# Patient Record
Sex: MALE | Race: WHITE
[De-identification: names, ages, dates, MRNs, and addresses within clinical notes are randomized per-mention and may not be internally consistent; named-entity substitution may affect disease eponyms.]

---

## 2018-09-18 ENCOUNTER — HOSPITAL ENCOUNTER (EMERGENCY)
Dept: HOSPITAL 46 - ED | Age: 68
Discharge: HOME | End: 2018-09-18
Payer: MEDICARE

## 2018-09-18 VITALS — WEIGHT: 220 LBS | BODY MASS INDEX: 32.58 KG/M2 | HEIGHT: 69 IN

## 2018-09-18 DIAGNOSIS — S32.019D: ICD-10-CM

## 2018-09-18 DIAGNOSIS — Z79.899: ICD-10-CM

## 2018-09-18 DIAGNOSIS — G93.41: Primary | ICD-10-CM

## 2018-09-18 NOTE — EKG
[facility]
                                    [address] [facility]
                                  [place], [place]  [address]
_________________________________________________________________________________________
                                                                 Signed   
 
 
Normal sinus rhythm
Normal ECG
No previous ECGs available
Confirmed by JOHNIE JOHNSON DO ([id number]) on 9/18/2018 8:51:15 PM
 
 
 
 
 
 
 
 
 
 
 
 
 
 
 
 
 
 
 
 
 
 
 
 
 
 
 
 
 
 
 
 
 
 
 
 
 
 
 
 
 
    Electronically Signed By: JOHNIE JOHNSON DO  09/18/18 2051
_________________________________________________________________________________________
PATIENT NAME:     JAZLYN CLARKEAGNES RIVERA                  
MEDICAL RECORD #: [id number]                     Electrocardiogram             
          ACCT #: [id number]  
DATE OF BIRTH:   [birth year]                                       
PHYSICIAN:   JOHNIE JOHNSON DO                     REPORT #: [id number]
REPORT IS CONFIDENTIAL AND NOT TO BE RELEASED WITHOUT AUTHORIZATION

## 2018-09-18 NOTE — XMS
Encounter Summary
  Created on: 2018
 
 Elizabeth Jp RIVERA
 External Reference #: [id number]
 : 48
 Sex: Male
 
 Demographics
 
 
+-----------------------+----------------------+
| Address               | [address]          |
|                       | [place]  [address] |
+-----------------------+----------------------+
| Home Phone            | [phone]      |
+-----------------------+----------------------+
| Preferred Language    | Unknown              |
+-----------------------+----------------------+
| Marital Status        |              |
+-----------------------+----------------------+
| Oriental orthodox Affiliation | [id number]                 |
+-----------------------+----------------------+
| Race                  | Unknown              |
+-----------------------+----------------------+
| Ethnic Group          | Unknown              |
+-----------------------+----------------------+
 
 
 Author
 
 
+--------------+-----------------------+
| Author       | [facility] |
+--------------+-----------------------+
| Organization | [facility] |
+--------------+-----------------------+
| Address      | Unknown               |
+--------------+-----------------------+
| Phone        | Unavailable           |
+--------------+-----------------------+
 
 
 
 Support
 
 
+---------------+--------------+---------+-----------------+
| Name          | Relationship | Address | Phone           |
+---------------+--------------+---------+-----------------+
| Delilah Hall | ECON         | Unknown | [phone] |
+---------------+--------------+---------+-----------------+
| Tesha Veloz   | ECON         | Unknown | [phone] |
+---------------+--------------+---------+-----------------+
 
 
 
 Care Team Providers
 
 
 
+------------------------+------+-----------------+
| Care Team Member Name  | Role | Phone           |
+------------------------+------+-----------------+
| Bj Samuel MD | PCP  | [phone] |
+------------------------+------+-----------------+
 
 
 
 Reason for Visit
 
 
+--------+-----------+
| Reason | Comments  |
+--------+-----------+
| Other  | Emphysema |
+--------+-----------+
 Consult and Treat (Urgent)
 
+----------+--------+-------------+--------------+--------------+---------------+
| Status   | Reason | Specialty   | Diagnoses /  | Referred By  | Referred To   |
|          |        |             | Procedures   | Contact      | Contact       |
+----------+--------+-------------+--------------+--------------+---------------+
| Pending  |        | Pulmonology |   Diagnoses  |   Sermon,    |   Ben,   |
| Review   |        |             |  Emphysema,  | Kirby FITZPATRICK,   | Edwina     |
|          |        |             | unspecified  | [place]  [address]      | MD Linda   |
|          |        |             | ([facility])        | [place]       | [address] |
|          |        |             | Hypoxemia    | [place]  |             |
|          |        |             | Pulmonary    | [address]        | [place], [place]  |
|          |        |             | nodules      | Phone:       | [address]  Phone: |
|          |        |             |              | [phone] |  [phone] |
|          |        |             |              |   Fax:       |   Fax:        |
|          |        |             |              | [phone] | [phone]  |
+----------+--------+-------------+--------------+--------------+---------------+
 
 
 
 
 Encounter Details
 
 
+--------+---------+---------------------+----------------------+----------------------+
| Date   | Type    | Department          | Care Team            | Description          |
+--------+---------+---------------------+----------------------+----------------------+
| / | Office  |   [facility]     |   Ben,          | Asthma with COPD     |
| 2018   | Visit   | Pulmonology  [address]   | Edwina Mckeon,   | (chronic obstructive |
|        |         | Nelli GIBSON   | MD  [address] |  pulmonary disease)  |
|        |         | [place], [place]        |   [place] [address] | ([facility]) (Primary Dx);  |
|        |         | [address]          |   [phone]       | Nocturnal hypoxemia; |
|        |         | [phone]        | [phone] (Fax)   |  Multiple pulmonary  |
|        |         |                     |                      | nodules; EMELINA         |
|        |         |                     |                      | (obstructive sleep   |
|        |         |                     |                      | apnea); Personal     |
|        |         |                     |                      | history of tobacco   |
|        |         |                     |                      | use, presenting      |
|        |         |                     |                      | hazards to health    |
+--------+---------+---------------------+----------------------+----------------------+
 
 
 
 
 Social History
 
 
+---------------+-------+-----------+--------+------------------+
| Tobacco Use   | Types | Packs/Day | Years  | Date             |
|               |       |           | Used   |                  |
+---------------+-------+-----------+--------+------------------+
| Former Smoker |       | 3         | 20     | Quit: 1998 |
+---------------+-------+-----------+--------+------------------+
 
 
 
+---------------------+---+---+---+
| Smokeless Tobacco:  |   |   |   |
| Never Used          |   |   |   |
+---------------------+---+---+---+
 
 
 
+-------------+-----------+---------+----------+
| Alcohol Use | Drinks/We | oz/Week | Comments |
|             | ek        |         |          |
+-------------+-----------+---------+----------+
| No          |           |         |          |
+-------------+-----------+---------+----------+
 
 
 
+------------------+---------------+
| Sex Assigned at  | Date Recorded |
| Birth            |               |
+------------------+---------------+
| Not on file      |               |
+------------------+---------------+
 as of this encounter
 
 Last Filed Vital Signs
 
 
+-------------------+---------------------+-------------------------+
| Vital Sign        | Reading             | Time Taken              |
+-------------------+---------------------+-------------------------+
| Blood Pressure    | 91/60               | 2018 10:47 AM PDT |
+-------------------+---------------------+-------------------------+
| Pulse             | 75                  | 2018 10:47 AM PDT |
+-------------------+---------------------+-------------------------+
| Temperature       | 36.8   C (98.3   F) | 2018 10:47 AM PDT |
+-------------------+---------------------+-------------------------+
| Respiratory Rate  | -                   | -                       |
+-------------------+---------------------+-------------------------+
| Oxygen Saturation | 92%                 | 2018 10:47 AM PDT |
+-------------------+---------------------+-------------------------+
| Inhaled Oxygen    | -                   | -                       |
| Concentration     |                     |                         |
+-------------------+---------------------+-------------------------+
| Weight            | -                   | -                       |
+-------------------+---------------------+-------------------------+
| Height            | 175.3 cm (5' 9")    | 2018 10:47 AM PDT |
+-------------------+---------------------+-------------------------+
 
| Body Mass Index   | -                   | -                       |
+-------------------+---------------------+-------------------------+
 in this encounter
 
 Instructions
 Patient Instructions - Edwina Contreras MD - 2018 10:45 AM PDTPLS ASK YOU
R PCP TO REFER YOU TO SLEEP MEDICINE FOR EVALUATION OF OBSTRUCTIVE SLEEP APNEAin this encoun
ter
 
 Progress Notes
 Edwina Contreras MD - 2018 10:45 AM PDTFormatting of this note may be dif
ferent from the original.
   
 Subjective: 
 
 Patient ID: Jp Addison is a 68 y.o. male with COPD/emphysema, chronic resp failur
e with hypoxemia, Schizoaffective d/o, HTN, 
 
 HPI 
 
 Mr Addison is a pleasant 68 yr old man who had asthma as a child (was on inhaled treatme
nts), and who has been diagnosed about 7 years ago with COPD, and has been on chronic oxygen
 therapy for at least 2 years ago. He was admitted to [facility] in  for massive GI bleeding f
rom a duodenal mass. He underwent IR embolization and ex lap for this. He was found to have 
a bleeding ulcer, but no evidence of malignancy. He was recently admitted in [facility] in April
 for an acute worsening of his dyspnea and was treated as a COPD exacerbation. He has been m
aintained on Incruse Ellipta daily and Pulmicort nebulization BID. He is on oxygen at night 
at 2LPM. He also is on daily furosemide due to pedal edema. 
 
 Clinically, he is short of breath with minimal exertion. He has difficulty walking on his f
eet without assistance. He is awaiting a walker that was prescribed by his PCP. He has a chr
onic cough productive of yellow to white phlegm. He denies hemoptysis. He is suspected of ha
ving EMELINA but has not undergone evaluation for this. He denies chest pains, but has pedal saqib
ma. He has no PNDs and no orthopnea. He lays usually on his side to sleep. He snores at [facility]. He denies dysphagia or aspiration episodes. He also denies heart burn or reflux s/s. He h
as no seasonal allergies.
 
 He comes in with a home health care provider and his sister. 
 
 SOCIAL HISTORY
 He is a past smoker, stopped 20 years ago, 2-3 packs a day for 30 years. 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 He denies any occupational exposures. He is origin
ally from [place] and moved to [place] in . He has no animals at home, but used to have cats. He d
enies owning birds. 
 
 The following portions of the patient's history were reviewed and updated as appropriate an
d is available elsewhere in the record: allergies, current medications, past family history,
 past medical history, past social history, past surgical history and problem list.
 
 Review of Systems 
 Constitutional: Positive for fatigue. Negative for activity change, appetite change, chills
, diaphoresis, fever and unexpected weight change. 
 HENT: Negative for congestion, nosebleeds, postnasal drip, rhinorrhea and sore throat.  
 Eyes: Negative for visual disturbance. 
 Respiratory: Positive for apnea (suspected), cough, shortness of breath and wheezing. Negat
jp for choking, chest tightness and stridor.  
 Cardiovascular: Positive for leg swelling. Negative for chest pain and palpitations. 
 Gastrointestinal: Negative for constipation, diarrhea and nausea. 
 Genitourinary: Negative for dysuria and frequency. 
 Musculoskeletal: Positive for arthralgias and gait problem. Negative for joint swelling, my
 
algias and neck pain. 
 Skin: Negative for rash. 
 Neurological: Negative for dizziness, weakness and light-headedness. 
 Hematological: Negative for adenopathy. 
 Psychiatric/Behavioral: Negative for sleep disturbance. 
 
 Active Ambulatory Problems 
   Diagnosis Date Noted 
   Hypoxemia 2018 
   COPD (chronic obstructive pulmonary disease) ([facility]) 2018 
   Schizoaffective disorder ([facility]) 2018 
   Essential hypertension 2018 
   Memory deficits 2018 
 
 Resolved Ambulatory Problems 
   Diagnosis Date Noted 
   Weakness 2018 
 
 Past Medical History: 
 Diagnosis Date 
   Back injury  
   Bleeding ulcer  
   COPD (chronic obstructive pulmonary disease) ([facility])  
   Head injury  
   Other chronic pain  
   Schizoaffective disorder ([facility])  
 
 History reviewed. No pertinent surgical history.
 
     
 Objective: 
 Physical Exam
 
 BP 91/60 (BP Location: Left upper arm, Patient Position: Sitting)  | Pulse 75  | Temp 98.3 
F (36.8 C) (Oral)  | Ht 1.753 m (5' 9") Comment: per pt | SpO2 92% 
 
 Vital signs reviewed.
 Oxygen saturation noted at 92% on ambient air
 GENERAL: obese, pale, pleasant, cooperative, oriented, not in distress
 HEENT: pink conjunctiva, anicteric sclerae, moist oral mucosae and without any lesions, nor
mal appearing nasal mucosae; JVP cannot be assessed; MALAMPATTI 4; no thyromegaly; no cervic
olymphadenopathies
 CVS: PMI laterally displaced, NRRR, S1 and S2, no murmurs/gallops/rubs
 CHEST: Examination of the chest showed a mild kyphosis.
 LUNGS: Normal effort, Equal in expansion, resonant to percussion, diffuse wheezing present,
 especially in bases, no crackles
 ABDOMEN: Protuberant abdomen, NABS, non-tender on palpation, liver span normal, no masses p
alpated
 EXTREMITIES: good distal pulses, no cyanosis, no clubbing, no nail abnormalities, with +2 p
edal edema
 NEURO: awake and oriented, no focal neurologic deficits, flat affect, on a wheelchair today
 
 LABORATORY AND IMAGING
 
 Pulmonary Function Test:
   18
 FEV1  1.45 (45%)
 FVC  2.19 (52%)
 FEV1/FVC 66
 TLC 
 
 RV/TLC
 DLCO
 
 CTA of the chest done on 18 reviewed
 Pulmonary Arteries: 
 Diagnostic quality: Nearly nondiagnostic secondary to poor contrast opacification of the pu
lmonary arteries. No gross evidence of large central pulmonary embolism.
 
 RV/LV is within normal limits. There is no interventricular septal bowing. There is trace r
eflux of contrast material in the IVC.
 
 Lungs/Pleura: No consolidation. Scattered calcified granulomas measuring up to 7 mm. No ple
ural effusion or pneumothorax. Mild bilateral centrilobular emphysema.
 
 Mediastinum: The heart size is normal. No pericardial effusion. No adenopathy. 
 
 Thoracic Aorta: Ectatic ascending thoracic aorta measures up to 4.0 cm in diameter.
 
 Upper Abdomen: Unremarkable.
 
 Other: Focal kyphosis in the upper thoracic spine secondary to severe compression deformity
 at T3 which appears chronic. No acute osseous abnormality.
 
 Echo done on 18 reviewed
 Impression 
 1. This was a technically difficult study with suboptimal views. 
 2. Overall left ventricular systolic function is normal with, an EF between 60 - 65 %. 
 3. The right ventricle is moderately enlarged measuring between 3.8 - 4.1 cm. 
 
    
 Assessment and Plan: 
 
 1. Asthma with COPD (chronic obstructive pulmonary disease) (HCC)
 Mr Addison is a 68 yr old man who I suspect has asthma-COPD overlap. He has declined in 
the past years, and especially from his last hospitalization. I have recommended changing In
jackie to Anoro, which will provide him with a LABA and a LAMA. Continue Budesonide nebulizat
ion BID. 
 
 I have educated the patient about the nature of COPD. This is a progressive disease, and marian
ng function, unfortunately, declines every year, despite present therapy and even oxygen use
. Our main goal is to prevent further exacerbation, and to moderate the manifestation of david
g function decline by ensuring conditioning of muscles that are responsible for the extra ef
fort of breathing, as well as to improve patient's endurance. These may all be accomplished 
by a formal exercise regimen, and/or by participating in a formal Pulmonary Rehabilitation P
svetlana. This program has been shown to improve functional status, exercise capacity, percept
ion of dyspnea, and even has a role in improving depression in these patients. He and his si
ster agreed, and so have sent a Pulm Rehab request through [facility]. He reportedly had 
a recent PFT done over there and we are awaiting its result. 
 
 - umeclidinium-vilanterol (ANORO ELLIPTA) 62.5-25 MCG/INH inhaler; Inhale 1 puff into the l
ungs daily.  Dispense: 1 each; Refill: 11
 
 2. Nocturnal hypoxemia
 Continue with O2 supplementation at night at 2LPM. His care giver was instructed to let us 
know if his saturations are falling to 88% and below during the day. He may need to be place
d on daytime O2 at that point. 
 
 3. Multiple pulmonary nodules
 He has small pulm nodules that are likely post inflammatory. Given his past history of smok
ing, he will need to have a repeat CT in 2019 as part of continued surveillance. 
 
 
 4. EMELINA (obstructive sleep apnea)
 I suspect that he has untreated EMELINA and I have encouraged him to talk to his PCP regarding 
this and potentially sending him to a sleep doctor close to his home. 
 
 5. Personal history of tobacco use, presenting hazards to health
 Continue being vigilant about signs of malignancy. CT chest should be repeated in .
 
 Thank you for allowing us to participate in this patient's care. A return visit has been re
quested/scheduled in three months for close clinical follow up. The patient was instructed t
o call our clinic for any questions, and for any concerns regarding worsening dyspnea, cough
 or change in sputum production. We will see the patient sooner than the recommended follow 
up date,  if with any worsening of symptoms. 
 
 Edwina Contreras MD
 Pulmonary and Critical Care Medicine
 [facility]
 [address]
 [place] [address]
 . 
  
 
  
 
 in this encounter
 
 Plan of Treatment
 
 
+--------+---------+-------------+----------------------+-------------+
| Date   | Type    | Specialty   | Care Team            | Description |
+--------+---------+-------------+----------------------+-------------+
| 10/19/ | Office  | Pulmonology |   Ben,          |             |
|    | Visit   |             | Edwina Mckeon,   |             |
|        |         |             | MD [address] |             |
|        |         |             |   [place] [address] |             |
|        |         |             |   [phone]       |             |
|        |         |             | [phone] (Fax)   |             |
+--------+---------+-------------+----------------------+-------------+
 as of this encounter
 
 Visit Diagnoses
 
 
+--------------------------------------------------------------------------+
| Diagnosis                                                                |
+--------------------------------------------------------------------------+
| Asthma with COPD (chronic obstructive pulmonary disease) (HCC) - Primary |
+--------------------------------------------------------------------------+
|   Chronic obstructive asthma, unspecified                                |
+--------------------------------------------------------------------------+
| Nocturnal hypoxemia                                                      |
+--------------------------------------------------------------------------+
|   Hypoxemia                                                              |
+--------------------------------------------------------------------------+
| Multiple pulmonary nodules                                               |
+--------------------------------------------------------------------------+
|   Other nonspecific abnormal finding of lung field                       |
+--------------------------------------------------------------------------+
 
| EMELINA (obstructive sleep apnea)                                            |
+--------------------------------------------------------------------------+
|   Obstructive sleep apnea (adult) (pediatric)                            |
+--------------------------------------------------------------------------+
| Personal history of tobacco use, presenting hazards to health            |
+--------------------------------------------------------------------------+

## 2018-09-18 NOTE — XMS
Encounter Summary
  Created on: 2018
 
 Dallam Jp RIVERA
 External Reference #: [id number]
 : 48
 Sex: Male
 
 Demographics
 
 
+-----------------------+----------------------+
| Address               | [address]          |
|                       | [place]  [address] |
+-----------------------+----------------------+
| Home Phone            | [phone]      |
+-----------------------+----------------------+
| Preferred Language    | Unknown              |
+-----------------------+----------------------+
| Marital Status        |              |
+-----------------------+----------------------+
| Zoroastrianism Affiliation | [id number]                 |
+-----------------------+----------------------+
| Race                  | Unknown              |
+-----------------------+----------------------+
| Ethnic Group          | Unknown              |
+-----------------------+----------------------+
 
 
 Author
 
 
+--------------+-----------------------+
| Author       | [facility] |
+--------------+-----------------------+
| Organization | [facility] |
+--------------+-----------------------+
| Address      | Unknown               |
+--------------+-----------------------+
| Phone        | Unavailable           |
+--------------+-----------------------+
 
 
 
 Support
 
 
+---------------+--------------+---------+-----------------+
| Name          | Relationship | Address | Phone           |
+---------------+--------------+---------+-----------------+
| Delilah Hall | ECON         | Unknown | [phone] |
+---------------+--------------+---------+-----------------+
| Tesha Veloz   | ECON         | Unknown | [phone] |
+---------------+--------------+---------+-----------------+
 
 
 
 Care Team Providers
 
 
 
+------------------------+------+-----------------+
| Care Team Member Name  | Role | Phone           |
+------------------------+------+-----------------+
| Bj Samuel MD | PCP  | [phone] |
+------------------------+------+-----------------+
 
 
 
 Reason for Visit
 
 
+---------------------+----------+
| Reason              | Comments |
+---------------------+----------+
| Weakness            |          |
+---------------------+----------+
| Shortness of Breath |          |
+---------------------+----------+
 
 
 
 Encounter Details
 
 
+--------+-----------+----------------------+----------------------+----------------------+
| Date   | Type      | Department           | Care Team            | Description          |
+--------+-----------+----------------------+----------------------+----------------------+
| / | Emergency |   [facility]    |   Candido Rainey DO | Lymphedema (Primary  |
| 2018   |           | [facility] Center       |   Emergency          | Dx); Generalized     |
|        |           | Emergency Department | Department  [address]      | weakness             |
|        |           |   [address]     | [address]           |                      |
|        |           | [place] [address]   | [place] [address]   |                      |
|        |           | [phone]         | [phone]         |                      |
|        |           |                      | [phone] (Fax)   |                      |
+--------+-----------+----------------------+----------------------+----------------------+
 
 
 
 Social History
 
 
+---------------+-------+-----------+--------+------------------+
| Tobacco Use   | Types | Packs/Day | Years  | Date             |
|               |       |           | Used   |                  |
+---------------+-------+-----------+--------+------------------+
| Former Smoker |       |           |        | Quit: 1998 |
+---------------+-------+-----------+--------+------------------+
 
 
 
+---------------------+---+---+---+
| Smokeless Tobacco:  |   |   |   |
| Never Used          |   |   |   |
+---------------------+---+---+---+
 
 
 
+-------------+-----------+---------+----------+
 
| Alcohol Use | Drinks/We | oz/Week | Comments |
|             | ek        |         |          |
+-------------+-----------+---------+----------+
| No          |           |         |          |
+-------------+-----------+---------+----------+
 
 
 
+------------------+---------------+
| Sex Assigned at  | Date Recorded |
| Birth            |               |
+------------------+---------------+
| Not on file      |               |
+------------------+---------------+
 as of this encounter
 
 Last Filed Vital Signs
 
 
+-------------------+---------------------+-------------------------+
| Vital Sign        | Reading             | Time Taken              |
+-------------------+---------------------+-------------------------+
| Blood Pressure    | 121/69              | 2018  4:16 PM PDT |
+-------------------+---------------------+-------------------------+
| Pulse             | 91                  | 2018  6:48 PM PDT |
+-------------------+---------------------+-------------------------+
| Temperature       | 36.6   C (97.8   F) | 2018  4:16 PM PDT |
+-------------------+---------------------+-------------------------+
| Respiratory Rate  | 18                  | 2018  5:43 PM PDT |
+-------------------+---------------------+-------------------------+
| Oxygen Saturation | 93%                 | 2018  6:48 PM PDT |
+-------------------+---------------------+-------------------------+
| Inhaled Oxygen    | -                   | -                       |
| Concentration     |                     |                         |
+-------------------+---------------------+-------------------------+
| Weight            | 100.2 kg (220 lb    | 2018  4:16 PM PDT |
|                   | 14.4 oz)            |                         |
+-------------------+---------------------+-------------------------+
| Height            | -                   | -                       |
+-------------------+---------------------+-------------------------+
| Body Mass Index   | 32.62               | 2018  4:16 PM PDT |
+-------------------+---------------------+-------------------------+
 in this encounter
 
 Discharge Instructions
 The following attachments cannot be sent through Care Everywhere.Weakness (Uncertain Cause)
 (English)Lymphedema, Managing (English)in this encounter
 
 Medications at Time of Discharge
 
 
+----------------------+----------------------+---------+---------+----------+-----------+
| Medication           | Sig.                 | Disp.   | Refills | Start    | End Date  |
|                      |                      |         |         | Date     |           |
+----------------------+----------------------+---------+---------+----------+-----------+
|   budesonide         | Take 0.5 mg by       |         |         |          |           |
| (PULMICORT) 0.5      | nebulization 2 (two) |         |         |          |           |
| MG/2ML nebulizer     |  times daily.        |         |         |          |           |
| suspension           |                      |         |         |          |           |
+----------------------+----------------------+---------+---------+----------+-----------+
 
|   buPROPion          | Take 300 mg by mouth |         |         |          |           |
| (WELLBUTRIN XL) 300  |  every morning.      |         |         |          |           |
| MG 24 hr tablet      |                      |         |         |          |           |
+----------------------+----------------------+---------+---------+----------+-----------+
|   ergocalciferol     | Take 50,000 Units by |         |         |          |           |
| (DRISDOL) 70030      |  mouth once a week.  |         |         |          |           |
| units capsule        |                      |         |         |          |           |
+----------------------+----------------------+---------+---------+----------+-----------+
|   L-methylfolate     | Take 15 mg by mouth  |         |         |          |           |
| (DEPLIN) 15 MG       | daily with           |         |         |          |           |
| tablet               | breakfast.           |         |         |          |           |
+----------------------+----------------------+---------+---------+----------+-----------+
|   lurasidone         | Take 40 mg by mouth  |         |         |          |           |
| (LATUDA) 40 MG       | daily.               |         |         |          |           |
| tablet               |                      |         |         |          |           |
+----------------------+----------------------+---------+---------+----------+-----------+
|   Methylcobalamin    | Take 5,000 mcg by    |         |         |          |           |
| (METHYL B-12 PO)     | mouth 2 (two) times  |         |         |          |           |
|                      | daily.               |         |         |          |           |
+----------------------+----------------------+---------+---------+----------+-----------+
|   OLANZapine         | Take 10 mg by mouth  |         |         |          |           |
| (ZYPREXA) 20 MG      | 2 (two) times daily. |         |         |          |           |
| tablet               |                      |         |         |          |           |
+----------------------+----------------------+---------+---------+----------+-----------+
|   omeprazole         | Take 20 mg by mouth  |         |         |          |           |
| (PRILOSEC) 20 MG     | 2 (two) times daily. |         |         |          |           |
| capsule              |                      |         |         |          |           |
+----------------------+----------------------+---------+---------+----------+-----------+
|   oxycodone (OXY-IR) | Take 1 capsule by    |   30    | 0       | 20 |           |
|  5 MG capsule        | mouth every 6 (six)  | capsule |         | 18       |           |
|                      | hours as needed.     |         |         |          |           |
+----------------------+----------------------+---------+---------+----------+-----------+
|   polyethylene       | Take 17 g by mouth   |         |         |          |           |
| glycol (GLYCOLAX)    | daily.               |         |         |          |           |
| packet               |                      |         |         |          |           |
+----------------------+----------------------+---------+---------+----------+-----------+
|   propranolol        | Take 20 mg by mouth  |         |         |          |           |
| (INDERAL) 20 MG      | 2 (two) times daily. |         |         |          |           |
| tablet               |                      |         |         |          |           |
+----------------------+----------------------+---------+---------+----------+-----------+
|   sertraline         | Take  mg by    |         |         |          |           |
| (ZOLOFT) 100 MG      | mouth See Admin      |         |         |          |           |
| tablet               | Instructions. Take   |         |         |          |           |
|                      | 100 mg by mouth      |         |         |          |           |
|                      | every morning and 50 |         |         |          |           |
|                      |  mg by mouth in the  |         |         |          |           |
|                      | afternoon            |         |         |          |           |
+----------------------+----------------------+---------+---------+----------+-----------+
|   simvastatin        | Take 40 mg by mouth  |         |         |          |           |
| (ZOCOR) 40 MG tablet | nightly.             |         |         |          |           |
+----------------------+----------------------+---------+---------+----------+-----------+
|   topiramate         | Take 25 mg by mouth  |         |         |          |           |
| (TOPAMAX) 25 MG      | 2 (two) times daily. |         |         |          |           |
| tablet               |                      |         |         |          |           |
+----------------------+----------------------+---------+---------+----------+-----------+
|   Umeclidinium       | Inhale 62.3 mcg into |         |         |          |  |
| Bromide (INCRUSE     |  the lungs every     |         |         |          | 8         |
| ELLIPTA IN)          | morning.             |         |         |          |           |
+----------------------+----------------------+---------+---------+----------+-----------+
 as of this encounter
 
 
 Plan of Treatment
 
 
+--------+---------+-------------+----------------------+-------------+
| Date   | Type    | Specialty   | Care Team            | Description |
+--------+---------+-------------+----------------------+-------------+
| 10/19/ | Office  | Pulmonology |   Ben,          |             |
| 2018   | Visit   |             | Edwina Mkceon,   |             |
|        |         |             | MD [address] |             |
|        |         |             |   [place] [address] |             |
|        |         |             |   [phone]       |             |
|        |         |             | [phone] (Fax)   |             |
+--------+---------+-------------+----------------------+-------------+
 as of this encounter
 
 Procedures
 
 
+----------------------+--------+-------------+----------------------+----------------------
+
| Procedure Name       | Priori | Date/Time   | Associated Diagnosis | Comments             
|
|                      | ty     |             |                      |                      
|
+----------------------+--------+-------------+----------------------+----------------------
+
| US LOWER EXTREMITY   | ASAP   | 2018  |                      |   Results for this   
|
| VENOUS DOPPLER RIGHT |        |  6:30 PM    |                      | procedure are in the 
|
|                      |        | PDT         |                      |  results section.    
|
+----------------------+--------+-------------+----------------------+----------------------
+
| XR CHEST 2 VIEW      | STAT   | 2018  |                      |   Results for this   
|
| FRONTAL AND LATERAL  |        |  5:29 PM    |                      | procedure are in the 
|
|                      |        | PDT         |                      |  results section.    
|
+----------------------+--------+-------------+----------------------+----------------------
+
| POC CARDIAC TROPONIN | Routin | 2018  |                      |   Results for this   
|
|                      | e      |  5:05 PM    |                      | procedure are in the 
|
|                      |        | PDT         |                      |  results section.    
|
+----------------------+--------+-------------+----------------------+----------------------
+
| KM CARD PANEL W/O   | STAT   | 2018  |                      |   Results for this   
|
| TRP (ED ONLY)        |        |  4:50 PM    |                      | procedure are in the 
|
|                      |        | PDT         |                      |  results section.    
|
+----------------------+--------+-------------+----------------------+----------------------
+
| BRAIN NATRIURETIC    | STAT   | 2018  |                      |   Results for this   
 
|
| PEPTIDE              |        |  4:50 PM    |                      | procedure are in the 
|
|                      |        | PDT         |                      |  results section.    
|
+----------------------+--------+-------------+----------------------+----------------------
+
| EKG 12 LEAD UNIT     | STAT   | 2018  |                      |   Results for this   
|
| PERFORMED            |        |  4:47 PM    |                      | procedure are in the 
|
|                      |        | PDT         |                      |  results section.    
|
+----------------------+--------+-------------+----------------------+----------------------
+
| URINALYSIS (REFLEX   | STAT   | 2018  |                      |   Results for this   
|
| TO                   |        |  4:01 PM    |                      | procedure are in the 
|
| MICROSCOPIC/REFLEX   |        | PDT         |                      |  results section.    
|
| TO CULTURE)          |        |             |                      |                      
|
+----------------------+--------+-------------+----------------------+----------------------
+
 in this encounter
 
 Results
 US lower extremity venous right (2018  6:30 PM)
 
+-------------------------------------------------------------------+--------------+
| Impressions                                                       | Performed At |
+-------------------------------------------------------------------+--------------+
|   1.    No evidence of lower extremity deep vein thrombosis.      |   [facility]     |
| Electronically signed by José Andujar MD on 2018 6:32 PM | RADIOLOGY    |
+-------------------------------------------------------------------+--------------+
 
 
 
+------------------------------------------------------------------------+--------------+
| Narrative                                                              | Performed At |
+------------------------------------------------------------------------+--------------+
|   [facility] LOWER EXTREMITY VENOUS DOPPLER RIGHT        |   [facility]     |
| 2018 6:30 PM     HISTORY:  68 years.    Male.    Shortness of     | RADIOLOGY    |
| breath with right leg swelling.     TECHNIQUE:  Right lower extremity  |              |
| venous Doppler performed with color Doppler and spectral Doppler       |              |
| waveform analysis. Duplex Doppler analysis.     COMPARISON:  None.     |              |
|  FINDINGS:  Normal compressibility, augmentation of flow, and Doppler  |              |
| flow evident within the deep venous system. No evidence of deep venous |              |
|  thrombosis.                                                           |              |
+------------------------------------------------------------------------+--------------+
 
 
 
+-------------------------------------------------------------------------------------------
--------------------------------------------------+
| Procedure Note                                                                            
                                                  |
+-------------------------------------------------------------------------------------------
--------------------------------------------------+
 
|   Edil, Rad Results In - 2018  6:37 PM PDT  [facility] LOWER EXTREMITY   
                                                  |
| VENOUS DOPPLER RIGHT2018 6:30 PMHISTORY:68 years.  Male.  Shortness of breath with   
                                                  |
| right leg swelling.TECHNIQUE:Right lower extremity venous Doppler performed with color    
                                                  |
| Doppler and spectral Doppler waveform analysis. Duplex Doppler                            
                                                  |
| analysis.COMPARISON:None.FINDINGS:Normal compressibility, augmentation of flow, and       
                                                  |
| Doppler flow evident within the deep venous system. No evidence of deep venous            
                                                  |
| thrombosis.IMPRESSION:1.  No evidence of lower extremity deep vein                        
                                                  |
| thrombosis.Electronically signed by José Andujar MD on 2018 6:32 PM              
                                                  |
|Right lower extremity venous Doppler performed with color Doppler and spectral Doppler wave
form analysis. Duplex Doppler analysis.           |
|                                                                                           
                                                  |
|COMPARISON:                                                                                
                                                 |
|None.                                                                                      
                                                 |
|FINDINGS:                                                                                  
                                                 |
|Normal compressibility, augmentation of flow, and Doppler flow evident within the deep veno
us system. No evidence of deep venous thrombosis. |
|                                                                                           
                                                  |
|IMPRESSION:                                                                                
                                                 |
|1.  No evidence of lower extremity deep vein thrombosis.                                   
                                                  |
|Electronically signed by José Andujar MD on 2018 6:32 PM                          
                                                  |
+-------------------------------------------------------------------------------------------
--------------------------------------------------+
 
 
 
+--------------------+------------------+--------------------+--------------+
| Performing         | Address          | City/State/Zipcode | Phone Number |
| Organization       |                  |                    |              |
+--------------------+------------------+--------------------+--------------+
|   [facility] |   [address] | [place] [address] |              |
+--------------------+------------------+--------------------+--------------+
 XR Chest PA and Lateral (2018  5:29 PM)
 
+---------------------------------------------------------------------+--------------+
| Impressions                                                         | Performed At |
+---------------------------------------------------------------------+--------------+
|   1.    No acute cardiopulmonary process noted.     Electronically  |   [facility]     |
| signed by Thierry Oconnor MD on 2018 5:32 PM                 | RADIOLOGY    |
+---------------------------------------------------------------------+--------------+
 
 
 
 
+------------------------------------------------------------------------+--------------+
| Narrative                                                              | Performed At |
+------------------------------------------------------------------------+--------------+
|   JP RIVERA [place]  1950  68 years  XR CHEST 2 VIEW FRONTAL   |   [facility]     |
| AND LATERAL  2018 5:29 PM     INDICATION: Chest pain.             | RADIOLOGY    |
| COMPARISON study: 2018     TECHNIQUE: 2 views                      |              |
| FINDINGS:    Small amount of scarring involving the lower lungs.       |              |
| Otherwise the lungs appear clear. Cardiomediastinal silhouette appears |              |
|  unremarkable. Osseous structures appear unremarkable. No pleural      |              |
| effusion seen.                                                         |              |
+------------------------------------------------------------------------+--------------+
 
 
 
+-------------------------------------------------------------------------------------------
--------------------------------------------------------------------------------------------
-------------------------------+
| Procedure Note                                                                            
                                                                                            
                               |
+-------------------------------------------------------------------------------------------
--------------------------------------------------------------------------------------------
-------------------------------+
|   Edil, Rad Results In - 2018  5:37 PM PDT  JP [place]  
                                                                                            
                               |
|  CHEST 2 VIEW FRONTAL AND LATERAL6/ 5:29 PMINDICATION: Chest pain.COMPARISON       
                                                                                            
                               |
| study: 2018TECHNIQUE: 2 viewsFINDINGS:  Small amount of scarring involving the lower  
                                                                                            
                               |
|  lungs. Otherwise the lungs appear clear. Cardiomediastinal silhouette appears            
                                                                                            
                               |
| unremarkable. Osseous structures appear unremarkable. No pleural effusion                 
                                                                                            
                               |
| seen.IMPRESSION:1.  No acute cardiopulmonary process noted.Electronically signed by Thierry  
                                                                                            
                               |
|  Gian Oconnor MD on 2018 5:32 PM                                                     
                                                                                            
                               |
|                                                                                           
                                                                                            
                               |
|COMPARISON study: 2018                                                                 
                                                                                            
                               |
|                                                                                           
                                                                                            
                               |
|TECHNIQUE: 2 views                                                                         
                                                                                            
                               |
|                                                                                           
                                                                                            
 
                               |
|FINDINGS:  Small amount of scarring involving the lower lungs. Otherwise the lungs appear c
lear. Cardiomediastinal silhouette appears unremarkable. Osseous structures appear unremarka
ble. No pleural effusion seen. |
|                                                                                           
                                                                                            
                               |
|IMPRESSION:                                                                                
                                                                                            
                              |
|1.  No acute cardiopulmonary process noted.                                                
                                                                                            
                               |
|                                                                                           
                                                                                            
                               |
|Electronically signed by Thierry Oconnor MD on 2018 5:32 PM                         
                                                                                            
                               |
+-------------------------------------------------------------------------------------------
--------------------------------------------------------------------------------------------
-------------------------------+
 
 
 
+--------------------+------------------+--------------------+--------------+
| Performing         | Address          | City/State/Zipcode | Phone Number |
| Organization       |                  |                    |              |
+--------------------+------------------+--------------------+--------------+
|   [facility] |   [address] | [place] [address] |              |
+--------------------+------------------+--------------------+--------------+
 POC cardiac troponin (2018  5:05 PM)
 
+----------------------+--------------------------+-------------------+-----------------+
| Component            | Value                    | Ref Range         | Performed At    |
+----------------------+--------------------------+-------------------+-----------------+
| POC CARDIAC TROPONIN | 0.00Comment:   0.00 to   | 0.00 - 0.10 ng/mL | [facility] |
|                      | 0.10    Consistent with  |                   |                 |
|                      | normal population0.11 to |                   |                 |
|                      |  0.40    Consistent with |                   |                 |
|                      |  increased risk for      |                   |                 |
|                      | adverse                  |                   |                 |
|                      | outcomes>0.40            |                   |                 |
|                      |       Consistent with    |                   |                 |
|                      | WHO criteria for Acute   |                   |                 |
|                      | MI Testing performed at  |                   |                 |
|                      | [facility];[address]            |                   |                 |
|                      | [address];[place] [address]   |                   |                 |
+----------------------+--------------------------+-------------------+-----------------+
 
 
 
+-------------------+------------------+--------------------+--------------+
| Performing        | Address          | City/State/Zipcode | Phone Number |
| Organization      |                  |                    |              |
+-------------------+------------------+--------------------+--------------+
|   [facility] |   [address] | [place] [address] |              |
+-------------------+------------------+--------------------+--------------+
 BNP (2018  4:50 PM)
 
 
+--------------------+-------------------------+---------------+-----------------+
| Component          | Value                   | Ref Range     | Performed At    |
+--------------------+-------------------------+---------------+-----------------+
| BRAIN NATRIURETIC  | 33.1Comment: Testing    | 0 - 100 pg/mL | [facility] |
| PEPTIDE            | performed at [facility];[address]    |               |                 |
|                    | [address];[place]  |               |                 |
|                    | [id number]                   |               |                 |
+--------------------+-------------------------+---------------+-----------------+
 
 
 
+----------+
| Specimen |
+----------+
| Blood    |
+----------+
 
 
 
+-------------------+------------------+--------------------+--------------+
| Performing        | Address          | City/State/Zipcode | Phone Number |
| Organization      |                  |                    |              |
+-------------------+------------------+--------------------+--------------+
|   [facility] |   [address] | [place] [address] |              |
+-------------------+------------------+--------------------+--------------+
 Cardiac Panel (2018  4:50 PM)
 
+-----------------+--------------------------+-------------------+-----------------+
| Component       | Value                    | Ref Range         | Performed At    |
+-----------------+--------------------------+-------------------+-----------------+
| WBC             | 5.47                     | 3.80 - 11.00 K/uL | [facility] |
+-----------------+--------------------------+-------------------+-----------------+
| RBC             | 4.26                     | 4.20 - 5.70 M/uL  | [facility] |
+-----------------+--------------------------+-------------------+-----------------+
| HGB             | 13.7                     | 13.2 - 17.0 g/dL  | [facility] |
+-----------------+--------------------------+-------------------+-----------------+
| HCT             | 40.5                     | 39.0 - 50.0 %     | [facility] |
+-----------------+--------------------------+-------------------+-----------------+
| MCV             | 95.2                     | 80.0 - 100.0 fl   | [facility] |
+-----------------+--------------------------+-------------------+-----------------+
| MCH             | 32.3                     | 27.0 - 34.0 pg    | [facility] |
+-----------------+--------------------------+-------------------+-----------------+
| MCHC            | 33.9                     | 32.0 - 35.5 g/dL  | [facility] |
+-----------------+--------------------------+-------------------+-----------------+
| RDW SD          | 51.2                     | 37 - 53 fl        | [facility] |
+-----------------+--------------------------+-------------------+-----------------+
| PLT             | 175                      | 150 - 400 K/uL    | [facility] |
+-----------------+--------------------------+-------------------+-----------------+
| MPV             | 9.0                      | fl                | [facility] |
+-----------------+--------------------------+-------------------+-----------------+
| DIFF TYPE       | AUTOMATED                |                   | [facility] |
+-----------------+--------------------------+-------------------+-----------------+
| NEUTROPHILS     | 56.11                    | %                 | [facility] |
+-----------------+--------------------------+-------------------+-----------------+
| LYMPHOCYTES     | 25.77                    | %                 | [facility] |
+-----------------+--------------------------+-------------------+-----------------+
| MONOCYTES       | 11.74                    | %                 | [facility] |
+-----------------+--------------------------+-------------------+-----------------+
| EOSINOPHILS     | 5.37                     | %                 | [facility] |
+-----------------+--------------------------+-------------------+-----------------+
 
| BASOPHILS       | 1.01                     | %                 | [facility] |
+-----------------+--------------------------+-------------------+-----------------+
| NEUTROPHILS ABS | 3.07                     | 1.90 - 7.40 K/uL  | [facility] |
+-----------------+--------------------------+-------------------+-----------------+
| LYMPHOCYTES ABS | 1.41                     | 1.00 - 3.90 K/uL  | [facility] |
+-----------------+--------------------------+-------------------+-----------------+
| MONOCYTES ABS   | 0.64                     | 0.00 - 0.80 K/uL  | [facility] |
+-----------------+--------------------------+-------------------+-----------------+
| EOSINOPHILS ABS | 0.29                     | 0.00 - 0.50 K/uL  | [facility] |
+-----------------+--------------------------+-------------------+-----------------+
| BASOPHILS ABS   | 0.06                     | 0.00 - 0.10 K/uL  | [facility] |
+-----------------+--------------------------+-------------------+-----------------+
| SODIUM          | 142                      | 135 - 145 mmol/L  | [facility] |
+-----------------+--------------------------+-------------------+-----------------+
| POTASSIUM       | 3.9                      | 3.5 - 4.9 mmol/L  | [facility] |
+-----------------+--------------------------+-------------------+-----------------+
| CHLORIDE        | 108                      | 99 - 109 mmol/L   | [facility] |
+-----------------+--------------------------+-------------------+-----------------+
| CO2             | 27                       | 23 - 32 mmol/L    | [facility] |
+-----------------+--------------------------+-------------------+-----------------+
| ANION GAP AGAP  | 11                       | 5 - 20 mmol/L     | [facility] |
+-----------------+--------------------------+-------------------+-----------------+
| GLUCOSE         | 102 (H)                  | 65 - 99 mg/dL     | [facility] |
+-----------------+--------------------------+-------------------+-----------------+
| BUN             | 14                       | 8 - 25 mg/dL      | [facility] |
+-----------------+--------------------------+-------------------+-----------------+
| CREATININE      | 0.80                     | 0.70 - 1.30 mg/dL | [facility] |
+-----------------+--------------------------+-------------------+-----------------+
| BUN/CREAT       | 17                       |                   | [facility] |
+-----------------+--------------------------+-------------------+-----------------+
| CALCIUM         | 8.9                      | 8.5 - 10.5 mg/dL  | [facility] |
+-----------------+--------------------------+-------------------+-----------------+
| TOTAL PROTEIN   | 6.8                      | 6.3 - 8.2 g/dL    | [facility] |
+-----------------+--------------------------+-------------------+-----------------+
| Albumin         | 3.3                      | 3.3 - 4.8 g/dL    | [facility] |
+-----------------+--------------------------+-------------------+-----------------+
| GLOBULIN        | 3.6                      | 1.3 - 4.9 g/dL    | [facility] |
+-----------------+--------------------------+-------------------+-----------------+
| A/G             | 0.9 (L)                  | 1.0 - 2.4         | [facility] |
+-----------------+--------------------------+-------------------+-----------------+
| TBIL            | 0.2                      | 0.1 - 1.5 mg/dL   | [facility] |
+-----------------+--------------------------+-------------------+-----------------+
| ALK PHOS        | 74                       | 35 - 115 U/L      | [facility] |
+-----------------+--------------------------+-------------------+-----------------+
| AST             | 20                       | 10 - 45 U/L       | [facility] |
+-----------------+--------------------------+-------------------+-----------------+
| ALT             | 33                       | 10 - 65 U/L       | [facility] |
+-----------------+--------------------------+-------------------+-----------------+
| EGFR            | >60Comment: GFR <60:     | >60 mL/min/1.73m2 | [facility] |
|                 | CHRONIC KIDNEY DISEASE,  |                   |                 |
|                 | IF FOUND OVER A 3 MONTH  |                   |                 |
|                 | PERIOD.GFR <15: KIDNEY   |                   |                 |
|                 | FAILURE.FOR       |                   |                 |
|                 | [employer], MULTIPLY THE  |                   |                 |
|                 | CALCULATED GFR BY        |                   |                 |
|                 | 1.210.This eGFR is       |                   |                 |
|                 | calculated using the     |                   |                 |
|                 | MDRD IDMS traceable      |                   |                 |
|                 | equation.** PLEASE NOTE  |                   |                 |
|                 | NEW CALCULATION          |                   |                 |
 
|                 | EFFECTIVE 2018 **  |                   |                 |
+-----------------+--------------------------+-------------------+-----------------+
| CPK             | 52 (L)                   | 55 - 400 U/L      | [facility] |
+-----------------+--------------------------+-------------------+-----------------+
| INR             | 1.0Comment: REFERENCE    |                   | [facility] |
|                 | RANGE:0.9 -              |                   |                 |
|                 | 1.2    NON-ANTICOAGULATE |                   |                 |
|                 | D2.0 - 3.0    ALL OTHER  |                   |                 |
|                 | THERAPEUTIC              |                   |                 |
|                 | INDICATIONS2.5 - 3.5     |                   |                 |
|                 | MECHANICAL HEART VALVES, |                   |                 |
|                 |  RECURRENT OR SYSTEMIC   |                   |                 |
|                 | EMBOLISM                 |                   |                 |
+-----------------+--------------------------+-------------------+-----------------+
| APTT            | 29                       | 23 - 32 seconds   | [facility] |
+-----------------+--------------------------+-------------------+-----------------+
| MMB             | <1.0                     | 0.5 - 3.6 ng/mL   | [facility] |
+-----------------+--------------------------+-------------------+-----------------+
| CK-MB Index     | UNABLE TO                |                   | [facility] |
|                 | CALCULATEComment:        |                   |                 |
|                 | Testing performed at     |                   |                 |
|                 | [facility];[address]            |                   |                 |
|                 | [address];[place] [address]   |                   |                 |
+-----------------+--------------------------+-------------------+-----------------+
 
 
 
+-------------------+------------------+--------------------+--------------+
| Performing        | Address          | City/State/Zipcode | Phone Number |
| Organization      |                  |                    |              |
+-------------------+------------------+--------------------+--------------+
|   [facility] |   [address] | [facility] [place] [address] |              |
+-------------------+------------------+--------------------+--------------+
 EKG 12 LEAD UNIT PERFORMED (2018  4:47 PM)
 
+-------------------+--------------------------+-----------+--------------+
| Component         | Value                    | Ref Range | Performed At |
+-------------------+--------------------------+-----------+--------------+
| Ventricular Rate  | 71                       | BPM       | [facility]     |
+-------------------+--------------------------+-----------+--------------+
| Atrial Rate       | 71                       | BPM       | [facility]     |
+-------------------+--------------------------+-----------+--------------+
| P-R Interval      | 160                      | ms        | [facility]     |
+-------------------+--------------------------+-----------+--------------+
| QRS Duration      | 82                       | ms        | [facility]     |
+-------------------+--------------------------+-----------+--------------+
| Q-T Interval      | 398                      | ms        | [facility]     |
+-------------------+--------------------------+-----------+--------------+
| QTC Calculation   | 432                      | ms        | [facility]     |
| (Bezdevin)           |                          |           |              |
+-------------------+--------------------------+-----------+--------------+
| Calculated P Axis | 23                       | degrees   | [facility]     |
+-------------------+--------------------------+-----------+--------------+
| Calculated R Axis | 21                       | degrees   | [facility]     |
+-------------------+--------------------------+-----------+--------------+
| Calculated T Axis | 25                       | degrees   | [facility]     |
+-------------------+--------------------------+-----------+--------------+
| Diagnosis         | Normal sinus             |           | [facility]     |
|                   | rhythmIncreased R/S      |           |              |
|                   | ratio in V1, consider    |           |              |
 
|                   | early transition or      |           |              |
|                   | posterior                |           |              |
|                   | infarctAbnormal ECGWhen  |           |              |
|                   | compared with ECG of     |           |              |
|                   | 2018 22:03,No     |           |              |
|                   | significant change was   |           |              |
|                   | foundThis ECG contains   |           |              |
|                   | Unconfirmed              |           |              |
|                   | Interpretation           |           |              |
|                   | Statements.    See ED    |           |              |
|                   | Record for Physician     |           |              |
|                   | Interpretation.          |           |              |
|                   | Confirmed by MUSE READ   |           |              |
|                   | ONLY, -COMPUTER ([id number]),   |           |              |
|                   |  Gelcaio Meyer   |           |              |
|                   | Kahlil ([id number]) on 2018  |           |              |
|                   | 9:43:27 PM               |           |              |
+-------------------+--------------------------+-----------+--------------+
 
 
 
+--------------+-------------------+--------------------+--------------+
| Performing   | Address           | City/State/Zipcode | Phone Number |
| Organization |                   |                    |              |
+--------------+-------------------+--------------------+--------------+
|   [facility]   |   [address] | [place] [address] |              |
+--------------+-------------------+--------------------+--------------+
 Urinalysis (reflex to microscopic/reflex to culture) (2018  4:01 PM)
 
+----------------------+--------------------------+----------------+-----------------+
| Component            | Value                    | Ref Range      | Performed At    |
+----------------------+--------------------------+----------------+-----------------+
| COLOR UA             | STRAW                    |                | [facility] |
+----------------------+--------------------------+----------------+-----------------+
| CLARITY              | CLEAR                    |                | [facility] |
+----------------------+--------------------------+----------------+-----------------+
| Specific Gravity, UA | 1.004                    | 1.002 - 1.030  | [facility] |
+----------------------+--------------------------+----------------+-----------------+
| LEUKOCYTE ESTERASE   | NEGATIVE                 | NEGATIVE       | [facility] |
+----------------------+--------------------------+----------------+-----------------+
| NITRITE              | NEGATIVE                 | NEGATIVE       | [facility] |
+----------------------+--------------------------+----------------+-----------------+
| UROBILINOGEN         | NORMAL                   | <1.1 mg/dL     | [facility] |
+----------------------+--------------------------+----------------+-----------------+
| PROTEIN              | NEGATIVE                 | NEGATIVE mg/dL | [facility] |
+----------------------+--------------------------+----------------+-----------------+
| PH,URINE             | 7.0                      | 5.0 - 8.0      | [facility] |
+----------------------+--------------------------+----------------+-----------------+
| BLOOD                | NEGATIVE                 | NEGATIVE       | [facility] |
+----------------------+--------------------------+----------------+-----------------+
| KETONES              | NEGATIVE                 | NEGATIVE mg/dL | [facility] |
+----------------------+--------------------------+----------------+-----------------+
| BILIRUBIN            | NEGATIVE                 | NEGATIVE       | [facility] |
+----------------------+--------------------------+----------------+-----------------+
| GLUCOSE              | NEGATIVEComment: Testing | NEGATIVE mg/dL | [facility] |
|                      |  performed at [facility];[address]    |                |                 |
|                      | [address];[place]   |                |                 |
|                      | [id number]                    |                |                 |
+----------------------+--------------------------+----------------+-----------------+
 
 
 
 
+--------------------+
| Specimen           |
+--------------------+
| Urine, Clean Catch |
+--------------------+
 
 
 
+-------------------+------------------+--------------------+--------------+
| Performing        | Address          | City/State/Zipcode | Phone Number |
| Organization      |                  |                    |              |
+-------------------+------------------+--------------------+--------------+
|   [facility] |   [address] | [place] [address] |              |
+-------------------+------------------+--------------------+--------------+
 in this encounter
 
 Visit Diagnoses
 
 
+-----------------------------+
| Diagnosis                   |
+-----------------------------+
| Lymphedema - Primary        |
+-----------------------------+
|   Other lymphedema          |
+-----------------------------+
| Generalized weakness        |
+-----------------------------+
|   Other malaise and fatigue |
+-----------------------------+
 
 
 
 Admitting Diagnoses
 
 
+-----------------------------+
| Diagnosis                   |
+-----------------------------+
| Lymphedema                  |
+-----------------------------+
|   Other lymphedema          |
+-----------------------------+
| Generalized weakness        |
+-----------------------------+
|   Other malaise and fatigue |
+-----------------------------+
 
 
 
 Administered Medications
 
 
+------------------+--------+---------+------+------+------+
| Medication Order | MAR    | Action  | Dose | Rate | Site |
|                  | Action | Date    |      |      |      |
+------------------+--------+---------+------+------+------+
 
 
 
 
+-----------------------------------+---+
|   sodium chloride 0.9 % flush 10  |   |
| mL  10 mL, Intravenous, Every 8   |   |
| Hours, First dose on 18  |   |
| at 1643                           |   |
+-----------------------------------+---+
|                                   |   |
+-----------------------------------+---+
 in this encounter

## 2018-09-18 NOTE — XMS
Encounter Summary
  Created on: 2018
 
 Holt Jp RIVERA
 External Reference #: [id number]
 : 48
 Sex: Male
 
 Demographics
 
 
+-----------------------+----------------------+
| Address               | [address]          |
|                       | [place]  [address] |
+-----------------------+----------------------+
| Home Phone            | [phone]      |
+-----------------------+----------------------+
| Preferred Language    | Unknown              |
+-----------------------+----------------------+
| Marital Status        |              |
+-----------------------+----------------------+
| Quaker Affiliation | [id number]                 |
+-----------------------+----------------------+
| Race                  | Unknown              |
+-----------------------+----------------------+
| Ethnic Group          | Unknown              |
+-----------------------+----------------------+
 
 
 Author
 
 
+--------------+-----------------------+
| Author       | [facility] |
+--------------+-----------------------+
| Organization | [facility] |
+--------------+-----------------------+
| Address      | Unknown               |
+--------------+-----------------------+
| Phone        | Unavailable           |
+--------------+-----------------------+
 
 
 
 Support
 
 
+---------------+--------------+---------+-----------------+
| Name          | Relationship | Address | Phone           |
+---------------+--------------+---------+-----------------+
| Delilah Hall | ECON         | Unknown | [phone] |
+---------------+--------------+---------+-----------------+
| Tesha Veloz   | ECON         | Unknown | [phone] |
+---------------+--------------+---------+-----------------+
 
 
 
 Care Team Providers
 
 
 
+-----------------------+------+-----------------+
| Care Team Member Name | Role | Phone           |
+-----------------------+------+-----------------+
| Mai Guo  | PCP  | [phone] |
+-----------------------+------+-----------------+
 
 
 
 Reason for Visit
 
 
+-----------------+----------------------------------------------------------------+
| Reason          | Comments                                                       |
+-----------------+----------------------------------------------------------------+
| Weakness        |                                                                |
+-----------------+----------------------------------------------------------------+
| Altered Mental  |                                                                |
| Status          |                                                                |
+-----------------+----------------------------------------------------------------+
| Cough           | recent pneumonia                                               |
+-----------------+----------------------------------------------------------------+
| Urinary Tract   | on abx                                                         |
| Infection       |                                                                |
+-----------------+----------------------------------------------------------------+
| Fall            | this morning at around 0700 - ground level and landed "on his  |
|                 | butt"                                                          |
+-----------------+----------------------------------------------------------------+
 
 
 
 Encounter Details
 
 
+--------+-----------+----------------------+-----------+----------------------+
| Date   | Type      | Department           | Care Team | Description          |
+--------+-----------+----------------------+-----------+----------------------+
| / | Emergency |   [facility]    |           | Fall, initial        |
|    |           | Medical Center       |           | encounter (Primary   |
|        |           | Emergency Department |           | Dx); Closed          |
|        |           |   [address]     |           | compression fracture |
|        |           | [place] [address]   |           |  of first lumbar     |
|        |           | [phone]         |           | vertebra, initial    |
|        |           |                      |           | encounter (HCC)      |
+--------+-----------+----------------------+-----------+----------------------+
 
 
 
 Social History
 
 
+---------------+-------+-----------+--------+------------------+
| Tobacco Use   | Types | Packs/Day | Years  | Date             |
|               |       |           | Used   |                  |
+---------------+-------+-----------+--------+------------------+
| Former Smoker |       | 3         | 20     | Quit: 1998 |
+---------------+-------+-----------+--------+------------------+
 
 
 
 
+---------------------+---+---+---+
| Smokeless Tobacco:  |   |   |   |
| Never Used          |   |   |   |
+---------------------+---+---+---+
 
 
 
+-------------+-----------+---------+----------+
| Alcohol Use | Drinks/We | oz/Week | Comments |
|             | ek        |         |          |
+-------------+-----------+---------+----------+
| No          |           |         |          |
+-------------+-----------+---------+----------+
 
 
 
+------------------+---------------+
| Sex Assigned at  | Date Recorded |
| Birth            |               |
+------------------+---------------+
| Not on file      |               |
+------------------+---------------+
 as of this encounter
 
 Last Filed Vital Signs
 
 
+-------------------+----------------------+-------------------------+
| Vital Sign        | Reading              | Time Taken              |
+-------------------+----------------------+-------------------------+
| Blood Pressure    | 128/80               | 2018  4:32 PM PDT |
+-------------------+----------------------+-------------------------+
| Pulse             | 88                   | 2018  4:32 PM PDT |
+-------------------+----------------------+-------------------------+
| Temperature       | 36.7   C (98   F)    | 2018  3:10 PM PDT |
+-------------------+----------------------+-------------------------+
| Respiratory Rate  | 20                   | 2018  4:32 PM PDT |
+-------------------+----------------------+-------------------------+
| Oxygen Saturation | 96%                  | 2018  4:32 PM PDT |
+-------------------+----------------------+-------------------------+
| Inhaled Oxygen    | -                    | -                       |
| Concentration     |                      |                         |
+-------------------+----------------------+-------------------------+
| Weight            | 98.9 kg (218 lb 0.6  | 2018  2:02 PM PDT |
|                   | oz)                  |                         |
+-------------------+----------------------+-------------------------+
| Height            | -                    | -                       |
+-------------------+----------------------+-------------------------+
| Body Mass Index   | 32.2                 | 2018  2:02 PM PDT |
+-------------------+----------------------+-------------------------+
 in this encounter
 
 Discharge Instructions
 Jahaira Marroquin PA-C - 2018If blood cultures or urine culture show concerning torrie salcedo, you will receive a phone call in 2-3 days with results.
 The following attachments cannot be sent through Care Everywhere.Fracture, Vertebral Compre
ssion (English)in this encounter
 
 Medications at Time of Discharge
 
 
 
+----------------------+----------------------+----------+---------+----------+----------+
| Medication           | Sig.                 | Disp.    | Refills | Start    | End Date |
|                      |                      |          |         | Date     |          |
+----------------------+----------------------+----------+---------+----------+----------+
|   Acetaminophen 500  | Take 1,000 mg by     |          |         |          |          |
| MG coapsule          | mouth 4 (four) times |          |         |          |          |
|                      |  daily.              |          |         |          |          |
+----------------------+----------------------+----------+---------+----------+----------+
|   albuterol          | Inhale 2 puffs into  |          |         |          |          |
| (PROVENTIL           | the lungs every 4    |          |         |          |          |
| HFA;VENTOLIN HFA)    | (four) hours as      |          |         |          |          |
| 108 (90 Base)        | needed for Wheezing. |          |         |          |          |
| MCG/ACT inhaler      |                      |          |         |          |          |
+----------------------+----------------------+----------+---------+----------+----------+
|   budesonide         | Take 0.5 mg by       |          |         |          |          |
| (PULMICORT) 0.5      | nebulization 2 (two) |          |         |          |          |
| MG/2ML nebulizer     |  times daily.        |          |         |          |          |
| suspension           |                      |          |         |          |          |
+----------------------+----------------------+----------+---------+----------+----------+
|   buPROPion          | Take 300 mg by mouth |          |         |          |          |
| (WELLBUTRIN XL) 300  |  every morning.      |          |         |          |          |
| MG 24 hr tablet      |                      |          |         |          |          |
+----------------------+----------------------+----------+---------+----------+----------+
|   dextromethorphan   | Take 60 mg by mouth  |          |         |          |          |
| polistirex (DELSYM)  | every 12 (twelve)    |          |         |          |          |
| 30 MG/5ML ER         | hours as needed for  |          |         |          |          |
| suspension           | Cough.               |          |         |          |          |
+----------------------+----------------------+----------+---------+----------+----------+
|   ergocalciferol     | Take 50,000 Units by |          |         |          |          |
| (DRISDOL) 16797      |  mouth once a week.  |          |         |          |          |
| units capsule        |                      |          |         |          |          |
+----------------------+----------------------+----------+---------+----------+----------+
|   furosemide (LASIX) | Take 20 mg by mouth  |          |         |          |          |
|  20 MG tablet        | daily.               |          |         |          |          |
+----------------------+----------------------+----------+---------+----------+----------+
|   guaifenesin        | Take 1,200 mg by     |          |         |          |          |
| (MUCINEX MAX) 1200   | mouth every 6 (six)  |          |         |          |          |
| MG 12hr tablet       | hours as needed.     |          |         |          |          |
+----------------------+----------------------+----------+---------+----------+----------+
|                      | Every 4 hours x 5    |   360 mL | 0       | 07/30/20 |          |
| ipratropium-albutero | days then Q4H prn    |          |         | 18       |          |
| l (DUO-NEB) 0.5-2.5  | for SOB or wheezing  |          |         |          |          |
| mg/3mL               |                      |          |         |          |          |
+----------------------+----------------------+----------+---------+----------+----------+
|   L-methylfolate     | Take 15 mg by mouth  |          |         |          |          |
| (DEPLIN) 15 MG       | daily with           |          |         |          |          |
| tablet               | breakfast.           |          |         |          |          |
+----------------------+----------------------+----------+---------+----------+----------+
|   levofloxacin       | Take 1 tablet by     |   5      | 0       | 07/30/20 |          |
| (LEVAQUIN) 750 MG    | mouth daily.         | tablet   |         | 18       |          |
| tablet               |                      |          |         |          |          |
+----------------------+----------------------+----------+---------+----------+----------+
|   lurasidone         | Take 40 mg by mouth  |          |         |          |          |
| (LATUDA) 40 MG       | daily.               |          |         |          |          |
| tablet               |                      |          |         |          |          |
+----------------------+----------------------+----------+---------+----------+----------+
|   Methylcobalamin    | Take 5,000 mcg by    |          |         |          |          |
| (METHYL B-12 PO)     | mouth 2 (two) times  |          |         |          |          |
 
|                      | daily.               |          |         |          |          |
+----------------------+----------------------+----------+---------+----------+----------+
|   OLANZapine         | Take 10 mg by mouth  |          |         |          |          |
| (ZYPREXA) 20 MG      | 2 (two) times daily. |          |         |          |          |
| tablet               |                      |          |         |          |          |
+----------------------+----------------------+----------+---------+----------+----------+
|   omeprazole         | Take 20 mg by mouth  |          |         |          |          |
| (PRILOSEC) 20 MG     | 2 (two) times daily. |          |         |          |          |
| capsule              |                      |          |         |          |          |
+----------------------+----------------------+----------+---------+----------+----------+
|   oxycodone (OXY-IR) | Take 1 capsule by    |   30     | 0       | // |          |
|  5 MG capsule        | mouth every 6 (six)  | capsule  |         | 18       |          |
|                      | hours as needed.     |          |         |          |          |
+----------------------+----------------------+----------+---------+----------+----------+
|   polyethylene       | Take 17 g by mouth   |          |         |          |          |
| glycol (GLYCOLAX)    | daily.               |          |         |          |          |
| packet               |                      |          |         |          |          |
+----------------------+----------------------+----------+---------+----------+----------+
|   predniSONE         | Take 2 tabs x 3 days |   6      | 0       | //20 |          |
| (DELTASONE) 20 MG    |  then stop           | tablet   |         | 18       |          |
| tablet               |                      |          |         |          |          |
+----------------------+----------------------+----------+---------+----------+----------+
|   propranolol        | Take 20 mg by mouth  |          |         |          |          |
| (INDERAL) 20 MG      | 2 (two) times daily. |          |         |          |          |
| tablet               |                      |          |         |          |          |
+----------------------+----------------------+----------+---------+----------+----------+
|   sertraline         | Take  mg by    |          |         |          |          |
| (ZOLOFT) 100 MG      | mouth See Admin      |          |         |          |          |
| tablet               | Instructions. Take   |          |         |          |          |
|                      | 100 mg by mouth      |          |         |          |          |
|                      | every morning and 50 |          |         |          |          |
|                      |  mg by mouth in the  |          |         |          |          |
|                      | afternoon            |          |         |          |          |
+----------------------+----------------------+----------+---------+----------+----------+
|   simvastatin        | Take 40 mg by mouth  |          |         |          |          |
| (ZOCOR) 40 MG tablet | nightly.             |          |         |          |          |
+----------------------+----------------------+----------+---------+----------+----------+
|   topiramate         | Take 25 mg by mouth  |          |         |          |          |
| (TOPAMAX) 25 MG      | 2 (two) times daily. |          |         |          |          |
| tablet               |                      |          |         |          |          |
+----------------------+----------------------+----------+---------+----------+----------+
|                      | Inhale 1 puff into   |   1 each | 11      | 20 |          |
| umeclidinium-vilante | the lungs daily.     |          |         | 18       |          |
| rol (ANORO ELLIPTA)  |                      |          |         |          |          |
| 62.5-25 MCG/INH      |                      |          |         |          |          |
| inhalerIndications:  |                      |          |         |          |          |
| Asthma with COPD     |                      |          |         |          |          |
| (chronic obstructive |                      |          |         |          |          |
|  pulmonary disease)  |                      |          |         |          |          |
| ([facility])                |                      |          |         |          |          |
+----------------------+----------------------+----------+---------+----------+----------+
 as of this encounter
 
 Plan of Treatment
 
 
+--------+---------+-------------+----------------------+-------------+
| Date   | Type    | Specialty   | Care Team            | Description |
+--------+---------+-------------+----------------------+-------------+
| 10/19/ | Office  | Pulmonology |   Ben,          |             |
 
| 2018   | Visit   |             | Edwina Mckeon,   |             |
|        |         |             | MD [address] |             |
|        |         |             |   [place] [address] |             |
|        |         |             |   [phone]       |             |
|        |         |             | [phone] (Fax)   |             |
+--------+---------+-------------+----------------------+-------------+
 
 
 
+---------------------+--------+----------------------+----------------------+
| Name                | Priori | Associated Diagnoses | Date/Time            |
|                     | ty     |                      |                      |
+---------------------+--------+----------------------+----------------------+
| Blood Culture Set 1 | STAT   |                      | 2018  2:35 PM  |
|                     |        |                      | PDT                  |
+---------------------+--------+----------------------+----------------------+
| Blood Culture Set 2 | STAT   |                      | 2018  2:42 PM  |
|                     |        |                      | PDT                  |
+---------------------+--------+----------------------+----------------------+
 as of this encounter
 
 Procedures
 
 
+----------------------+--------+-------------+----------------------+----------------------
+
| Procedure Name       | Priori | Date/Time   | Associated Diagnosis | Comments             
|
|                      | ty     |             |                      |                      
|
+----------------------+--------+-------------+----------------------+----------------------
+
| XR LUMBAR SPINE      | ASAP   | 2018  |                      |   Results for this   
|
| LIMITED 2-3 VIEW     |        |  3:10 PM    |                      | procedure are in the 
|
|                      |        | PDT         |                      |  results section.    
|
+----------------------+--------+-------------+----------------------+----------------------
+
| XR CHEST 2 VIEW      | ASAP   | 2018  |                      |   Results for this   
|
| FRONTAL AND LATERAL  |        |  3:09 PM    |                      | procedure are in the 
|
|                      |        | PDT         |                      |  results section.    
|
+----------------------+--------+-------------+----------------------+----------------------
+
| XR SACRUM AND COCCYX | ASAP   | 2018  |                      |   Results for this   
|
|                      |        |  3:09 PM    |                      | procedure are in the 
|
|                      |        | PDT         |                      |  results section.    
|
+----------------------+--------+-------------+----------------------+----------------------
+
| URINALYSIS (REFLEX   | STAT   | 2018  |                      |   Results for this   
|
| TO                   |        |  2:48 PM    |                      | procedure are in the 
|
 
| MICROSCOPIC/REFLEX   |        | PDT         |                      |  results section.    
|
| TO CULTURE)          |        |             |                      |                      
|
+----------------------+--------+-------------+----------------------+----------------------
+
| BLOOD CULTURE, SET 2 | STAT   | 2018  |                      |                      
|
|                      |        |  2:42 PM    |                      |                      
|
|                      |        | PDT         |                      |                      
|
+----------------------+--------+-------------+----------------------+----------------------
+
| [facility] SEPTIC LACTIC   | STAT   | 2018  |                      |   Results for this   
|
| ACID                 |        |  2:35 PM    |                      | procedure are in the 
|
|                      |        | PDT         |                      |  results section.    
|
+----------------------+--------+-------------+----------------------+----------------------
+
| TROPONIN I           | STAT   | 2018  |                      |   Results for this   
|
|                      |        |  2:35 PM    |                      | procedure are in the 
|
|                      |        | PDT         |                      |  results section.    
|
+----------------------+--------+-------------+----------------------+----------------------
+
| BLOOD CULTURE, SET 1 | STAT   | 2018  |                      |                      
|
|                      |        |  2:35 PM    |                      |                      
|
|                      |        | PDT         |                      |                      
|
+----------------------+--------+-------------+----------------------+----------------------
+
| CBC W/AUTO DIFF      | STAT   | 2018  |                      |   Results for this   
|
| (REFLEX TO MANUAL)   |        |  2:35 PM    |                      | procedure are in the 
|
|                      |        | PDT         |                      |  results section.    
|
+----------------------+--------+-------------+----------------------+----------------------
+
| COMPREHENSIVE        | STAT   | 2018  |                      |   Results for this   
|
| METABOLIC PANEL      |        |  2:35 PM    |                      | procedure are in the 
|
|                      |        | PDT         |                      |  results section.    
|
+----------------------+--------+-------------+----------------------+----------------------
+
| EKG 12 LEAD UNIT     | Routin | 2018  |                      |   Results for this   
|
| PERFORMED            | e      |  2:33 PM    |                      | procedure are in the 
|
|                      |        | PDT         |                      |  results section.    
|
 
+----------------------+--------+-------------+----------------------+----------------------
+
| NEBULIZER/INHALATION | STAT   | 2018  |                      |                      
|
|  TREATMENT           |        |  2:30 PM    |                      |                      
|
|                      |        | PDT         |                      |                      
|
+----------------------+--------+-------------+----------------------+----------------------
+
| ED INFORMATION       | [facility] | 2018  |                      |   Results for this   
|
| EXCHANGE             | e      |  1:59 PM    |                      | procedure are in the 
|
|                      |        | PDT         |                      |  results section.    
|
+----------------------+--------+-------------+----------------------+----------------------
+
 in this encounter
 
 Results
 XR Lumbar Spine 3 View (2018  3:10 PM)
 
+------------------------------------------------------------------------+--------------+
| Impressions                                                            | Performed At |
+------------------------------------------------------------------------+--------------+
|   1.    Lumbar spine: Mild compression of the superior endplate of L1  |   [facility]     |
| of approximately 20%, age indeterminate with mild spondylotic changes  | RADIOLOGY    |
| of the spine.  2.    No evidence of sacrococcygeal fracture.           |              |
| Electronically signed by Eddie Lerma MD on 2018 3:37 PM        |              |
+------------------------------------------------------------------------+--------------+
 
 
 
+------------------------------------------------------------------------+--------------+
| Narrative                                                              | Performed At |
+------------------------------------------------------------------------+--------------+
|   HISTORY:  Sacrococcygeal injury. Lumbar spine injury. Fall.          |   [facility]     |
| COMPARISON:  None.     TECHNIQUE:  AP, lateral films of the lumbar     | RADIOLOGY    |
| spine.  AP, angled AP, lateral films of the sacrum and coccyx.         |              |
| FINDINGS:  Lumbar spine:  Mild diffuse osteopenia. Compression of the  |              |
| superior endplate of L1 of approximately 10-20%, age indeterminate.    |              |
| Coil in the right paraspinal T12 level. Mild L5-S1, L4-L5 degenerative |              |
|  facet changes.     Sacrum:  SI joints unremarkable. No definable      |              |
| sacral or coccygeal fracture. Oval density in the low pelvis probably  |              |
| represents partially distended urinary bladder.                        |              |
+------------------------------------------------------------------------+--------------+
 
 
 
+-------------------------------------------------------------------------------------------
--------------------------------------------------------------------------------------------
---------------+
| Procedure Note                                                                            
                                                                                            
               |
+-------------------------------------------------------------------------------------------
--------------------------------------------------------------------------------------------
---------------+
|   Edil, Rad Results In - 2018  3:42 PM PDT  HISTORY:Sacrococcygeal injury. Lumbar    
 
                                                                                            
               |
| spine injury. Fall.COMPARISON:None.TECHNIQUE:AP, lateral films of the lumbar spine.AP,    
                                                                                            
               |
| angled AP, lateral films of the sacrum and coccyx.FINDINGS:Lumbar spine:Mild diffuse      
                                                                                            
               |
| osteopenia. Compression of the superior endplate of L1 of approximately 10-20%, age       
                                                                                            
               |
| indeterminate. Coil in the right paraspinal T12 level. Mild L5-S1, L4-L5 degenerative     
                                                                                            
               |
| facet changes.Sacrum:SI joints unremarkable. No definable sacral or coccygeal fracture.   
                                                                                            
               |
| Oval density in the low pelvis probably represents partially distended urinary            
                                                                                            
               |
| bladder.IMPRESSION:1.  Lumbar spine: Mild compression of the superior endplate of L1 of   
                                                                                            
               |
| approximately 20%, age indeterminate with mild spondylotic changes of the spine.2.  No    
                                                                                            
               |
| evidence of sacrococcygeal fracture.Electronically signed by Eddie Lerma MD on         
                                                                                            
               |
| 2018 3:37 PM                                                                         
                                                                                            
               |
|Lumbar spine:                                                                              
                                                                                            
               |
|Mild diffuse osteopenia. Compression of the superior endplate of L1 of approximately 10-20%
, age indeterminate. Coil in the right paraspinal T12 level. Mild L5-S1, L4-L5 degenerative 
facet changes. |
|                                                                                           
                                                                                            
               |
|Sacrum:                                                                                    
                                                                                            
              |
|SI joints unremarkable. No definable sacral or coccygeal fracture. Oval density in the low 
pelvis probably represents partially distended urinary bladder.                             
               |
|                                                                                           
                                                                                            
               |
|IMPRESSION:                                                                                
                                                                                            
              |
|1.  Lumbar spine: Mild compression of the superior endplate of L1 of approximately 20%, age
 indeterminate with mild spondylotic changes of the spine.                                  
               |
|2.  No evidence of sacrococcygeal fracture.                                                
                                                                                            
               |
|                                                                                           
 
                                                                                            
               |
|Electronically signed by Eddie Lerma MD on 2018 3:37 PM                            
                                                                                            
               |
+-------------------------------------------------------------------------------------------
--------------------------------------------------------------------------------------------
---------------+
 
 
 
+--------------------+------------------+--------------------+--------------+
| Performing         | Address          | City/State/Zipcode | Phone Number |
| Organization       |                  |                    |              |
+--------------------+------------------+--------------------+--------------+
|   [facility] |   [address] | [place] [address] |              |
+--------------------+------------------+--------------------+--------------+
 XR Chest PA and Lateral (2018  3:09 PM)
 
+------------------------------------------------------------------------+--------------+
| Impressions                                                            | Performed At |
+------------------------------------------------------------------------+--------------+
|   1.    Mild bilateral perihilar and bibasilar atelectasis.            |   [facility]     |
| 2.    Distention of the stomach.     Electronically signed by Eddie GONZALEZ  | RADIOLOGY    |
| MD [facility] on 2018 3:35 PM                                         |              |
+------------------------------------------------------------------------+--------------+
 
 
 
+------------------------------------------------------------------------+--------------+
| Narrative                                                              | Performed At |
+------------------------------------------------------------------------+--------------+
|   HISTORY:  Cough. Congestion. Question pneumonia.     COMPARISON:     |   [facility]     |
| 18.     TECHNIQUE:  AP and lateral films of the chest.           | RADIOLOGY    |
| FINDINGS:  Heart size is normal. Mild ectasia and tortuosity of the    |              |
| thoracic aorta. Minimal strandy change in both lung bases along the    |              |
| hemidiaphragms consistent with atelectasis. Subtle bilateral perihilar |              |
|  atelectasis. No infiltrates. No effusions. Mild degenerative changes  |              |
| of the spine. Distention of the stomach.                               |              |
+------------------------------------------------------------------------+--------------+
 
 
 
+-------------------------------------------------------------------------------------------
--------------------------------------------------------------------------------------------
------------------------------+
| Procedure Note                                                                            
                                                                                            
                              |
+-------------------------------------------------------------------------------------------
--------------------------------------------------------------------------------------------
------------------------------+
|   Edil, Rad Results In - 2018  3:40 PM PDT  HISTORY:Cough. Congestion. Question      
                                                                                            
                              |
| pneumonia.COMPARISON:18.TECHNIQUE:AP and lateral films of the chest.FINDINGS:Heart  
                                                                                            
                              |
|  size is normal. Mild ectasia and tortuosity of the thoracic aorta. Minimal strandy       
                                                                                            
 
                              |
| change in both lung bases along the hemidiaphragms consistent with atelectasis. Subtle    
                                                                                            
                              |
| bilateral perihilar atelectasis. No infiltrates. No effusions. Mild degenerative changes  
                                                                                            
                              |
|  of the spine. Distention of the stomach.IMPRESSION:1.  Mild bilateral perihilar and      
                                                                                            
                              |
| bibasilar atelectasis.2.  Distention of the stomach.Electronically signed by Eddie GONZALEZ      
                                                                                            
                              |
| MD [place]in on 2018 3:35 PM                                                            
                                                                                            
                              |
|                                                                                           
                                                                                            
                              |
|FINDINGS:                                                                                  
                                                                                            
                             |
|Heart size is normal. Mild ectasia and tortuosity of the thoracic aorta. Minimal strandy ch
phyllis in both lung bases along the hemidiaphragms consistent with atelectasis. Subtle bilater
al perihilar atelectasis. No  |
|infiltrates. No effusions. Mild degenerative changes of the spine. Distention of the stomac
h.                                                                                          
                             |
|                                                                                           
                                                                                            
                              |
|IMPRESSION:                                                                                
                                                                                            
                             |
|1.  Mild bilateral perihilar and bibasilar atelectasis.                                    
                                                                                            
                              |
|2.  Distention of the stomach.                                                             
                                                                                            
                              |
|                                                                                           
                                                                                            
                              |
|Electronically signed by Eddie Lerma MD on 2018 3:35 PM                            
                                                                                            
                              |
+-------------------------------------------------------------------------------------------
--------------------------------------------------------------------------------------------
------------------------------+
 
 
 
+--------------------+------------------+--------------------+--------------+
| Performing         | Address          | City/State/Zipcode | Phone Number |
| Organization       |                  |                    |              |
+--------------------+------------------+--------------------+--------------+
|   [facility] |   [address] | [place] [address] |              |
+--------------------+------------------+--------------------+--------------+
 XR Sacrum and Coccyx (2018  3:09 PM)
 
 
+------------------------------------------------------------------------+--------------+
| Impressions                                                            | Performed At |
+------------------------------------------------------------------------+--------------+
|   1.    Lumbar spine: Mild compression of the superior endplate of L1  |   [facility]     |
| of approximately 20%, age indeterminate with mild spondylotic changes  | RADIOLOGY    |
| of the spine.  2.    No evidence of sacrococcygeal fracture.           |              |
| Electronically signed by Eddie Lerma MD on 2018 3:37 PM        |              |
+------------------------------------------------------------------------+--------------+
 
 
 
+------------------------------------------------------------------------+--------------+
| Narrative                                                              | Performed At |
+------------------------------------------------------------------------+--------------+
|   HISTORY:  Sacrococcygeal injury. Lumbar spine injury. Fall.          |   [facility]     |
| COMPARISON:  None.     TECHNIQUE:  AP, lateral films of the lumbar     | RADIOLOGY    |
| spine.  AP, angled AP, lateral films of the sacrum and coccyx.         |              |
| FINDINGS:  Lumbar spine:  Mild diffuse osteopenia. Compression of the  |              |
| superior endplate of L1 of approximately 10-20%, age indeterminate.    |              |
| Coil in the right paraspinal T12 level. Mild L5-S1, L4-L5 degenerative |              |
|  facet changes.     Sacrum:  SI joints unremarkable. No definable      |              |
| sacral or coccygeal fracture. Oval density in the low pelvis probably  |              |
| represents partially distended urinary bladder.                        |              |
+------------------------------------------------------------------------+--------------+
 
 
 
+-------------------------------------------------------------------------------------------
--------------------------------------------------------------------------------------------
---------------+
| Procedure Note                                                                            
                                                                                            
               |
+-------------------------------------------------------------------------------------------
--------------------------------------------------------------------------------------------
---------------+
|   Edil, Rad Results In - 2018  3:42 PM PDT  HISTORY:Sacrococcygeal injury. Lumbar    
                                                                                            
               |
| spine injury. Fall.COMPARISON:None.TECHNIQUE:AP, lateral films of the lumbar spine.AP,    
                                                                                            
               |
| angled AP, lateral films of the sacrum and coccyx.FINDINGS:Lumbar spine:Mild diffuse      
                                                                                            
               |
| osteopenia. Compression of the superior endplate of L1 of approximately 10-20%, age       
                                                                                            
               |
| indeterminate. Coil in the right paraspinal T12 level. Mild L5-S1, L4-L5 degenerative     
                                                                                            
               |
| facet changes.Sacrum:SI joints unremarkable. No definable sacral or coccygeal fracture.   
                                                                                            
               |
| Oval density in the low pelvis probably represents partially distended urinary            
                                                                                            
               |
| bladder.IMPRESSION:1.  Lumbar spine: Mild compression of the superior endplate of L1 of   
                                                                                            
               |
 
| approximately 20%, age indeterminate with mild spondylotic changes of the spine.2.  No    
                                                                                            
               |
| evidence of sacrococcygeal fracture.Electronically signed by Eddie Lerma MD on         
                                                                                            
               |
| 2018 3:37 PM                                                                         
                                                                                            
               |
|Lumbar spine:                                                                              
                                                                                            
               |
|Mild diffuse osteopenia. Compression of the superior endplate of L1 of approximately 10-20%
, age indeterminate. Coil in the right paraspinal T12 level. Mild L5-S1, L4-L5 degenerative 
facet changes. |
|                                                                                           
                                                                                            
               |
|Sacrum:                                                                                    
                                                                                            
              |
|SI joints unremarkable. No definable sacral or coccygeal fracture. Oval density in the low 
pelvis probably represents partially distended urinary bladder.                             
               |
|                                                                                           
                                                                                            
               |
|IMPRESSION:                                                                                
                                                                                            
              |
|1.  Lumbar spine: Mild compression of the superior endplate of L1 of approximately 20%, age
 indeterminate with mild spondylotic changes of the spine.                                  
               |
|2.  No evidence of sacrococcygeal fracture.                                                
                                                                                            
               |
|                                                                                           
                                                                                            
               |
|Electronically signed by Eddie Lerma MD on 2018 3:37 PM                            
                                                                                            
               |
+-------------------------------------------------------------------------------------------
--------------------------------------------------------------------------------------------
---------------+
 
 
 
+--------------------+------------------+--------------------+--------------+
| Performing         | Address          | City/State/Zipcode | Phone Number |
| Organization       |                  |                    |              |
+--------------------+------------------+--------------------+--------------+
|   [facility] |   [address] | [place] [address] |              |
+--------------------+------------------+--------------------+--------------+
 Urinalysis (reflex to micro/reflex to culture) (2018  2:48 PM)
 
+----------------------+--------------------------+----------------+-----------------+
| Component            | Value                    | Ref Range      | Performed At    |
+----------------------+--------------------------+----------------+-----------------+
| COLOR UA             | YELLOW                   |                | [facility] |
 
+----------------------+--------------------------+----------------+-----------------+
| CLARITY              | HAZY                     |                | [facility] |
+----------------------+--------------------------+----------------+-----------------+
| Specific [place], UA | 1.013                    | 1.002 - 1.030  | [facility] |
+----------------------+--------------------------+----------------+-----------------+
| LEUKOCYTE ESTERASE   | NEGATIVE                 | NEGATIVE       | [facility] |
+----------------------+--------------------------+----------------+-----------------+
| NITRITE              | NEGATIVE                 | NEGATIVE       | [facility] |
+----------------------+--------------------------+----------------+-----------------+
| UROBILINOGEN         | NORMAL                   | <1.1 mg/dL     | [facility] |
+----------------------+--------------------------+----------------+-----------------+
| PROTEIN              | NEGATIVE                 | NEGATIVE mg/dL | [facility] |
+----------------------+--------------------------+----------------+-----------------+
| PH,URINE             | 7.0                      | 5.0 - 8.0      | [facility] |
+----------------------+--------------------------+----------------+-----------------+
| BLOOD                | NEGATIVE                 | NEGATIVE       | [facility] |
+----------------------+--------------------------+----------------+-----------------+
| KETONES              | NEGATIVE                 | NEGATIVE mg/dL | [facility] |
+----------------------+--------------------------+----------------+-----------------+
| BILIRUBIN            | NEGATIVE                 | NEGATIVE       | [facility] |
+----------------------+--------------------------+----------------+-----------------+
| GLUCOSE              | NEGATIVEComment: Testing | NEGATIVE mg/dL | [facility] |
|                      |  performed at [facility];[address]    |                |                 |
|                      | [address];[place]   |                |                 |
|                      | [address]                    |                |                 |
+----------------------+--------------------------+----------------+-----------------+
 
 
 
+--------------------+
| Specimen           |
+--------------------+
| Urine, Clean Catch |
+--------------------+
 
 
 
+-------------------+------------------+--------------------+--------------+
| Performing        | Address          | City/State/Zipcode | Phone Number |
| Organization      |                  |                    |              |
+-------------------+------------------+--------------------+--------------+
|   [facility] |   [address] | [place] [address] |              |
+-------------------+------------------+--------------------+--------------+
 Septic Lactic Acid (2018  2:35 PM)
 
+-------------+-------------------------+------------------+-----------------+
| Component   | Value                   | Ref Range        | Performed At    |
+-------------+-------------------------+------------------+-----------------+
| LACTIC ACID | 1.3Comment: Testing     | 0.4 - 2.0 mmol/L | [facility] |
|             | performed at [facility];[address]    |                  |                 |
|             | [address];[place]  |                  |                 |
|             | [address]                   |                  |                 |
+-------------+-------------------------+------------------+-----------------+
 
 
 
+-------------------+------------------+--------------------+--------------+
| Performing        | Address          | City/State/Zipcode | Phone Number |
| Organization      |                  |                    |              |
+-------------------+------------------+--------------------+--------------+
 
|   [facility] |   [address] | [place] [address] |              |
+-------------------+------------------+--------------------+--------------+
 Troponin I, Lab (2018  2:35 PM)
 
+------------+--------------------------+-------------------+-----------------+
| Component  | Value                    | Ref Range         | Performed At    |
+------------+--------------------------+-------------------+-----------------+
| TROPONIN I | <0.020Comment:   0.00 to | 0.00 - 0.10 ng/mL | [facility] |
|            |  0.10     CONSISTENT     |                   |                 |
|            | WITH NORMAL              |                   |                 |
|            | POPULATION0.11 to        |                   |                 |
|            | 0.60     CONSISTENT WITH |                   |                 |
|            |  INCREASED RISK FOR      |                   |                 |
|            | ADVERSE OUTCOMES>        |                   |                 |
|            | 0.60                     |                   |                 |
|            | CONSISTENT WITH WHO      |                   |                 |
|            | CRITERIA FOR ACUTE MI    |                   |                 |
|            | Testing performed at     |                   |                 |
|            | [facility];[address] [place] [address]            |                   |                 |
|            | Blvd;[place] [address]   |                   |                 |
+------------+--------------------------+-------------------+-----------------+
 
 
 
+----------+
| Specimen |
+----------+
| Blood    |
+----------+
 
 
 
+-------------------+------------------+--------------------+--------------+
| Performing        | Address          | City/State/Zipcode | Phone Number |
| Organization      |                  |                    |              |
+-------------------+------------------+--------------------+--------------+
|   [facility] |   [address] | [place] [address] |              |
+-------------------+------------------+--------------------+--------------+
 Comprehensive metabolic panel (2018  2:35 PM)
 
+----------------+--------------------------+-------------------+-----------------+
| Component      | Value                    | Ref Range         | Performed At    |
+----------------+--------------------------+-------------------+-----------------+
| SODIUM         | 140                      | 135 - 145 mmol/L  | [facility] |
+----------------+--------------------------+-------------------+-----------------+
| POTASSIUM      | 3.8                      | 3.5 - 4.9 mmol/L  | [facility] |
+----------------+--------------------------+-------------------+-----------------+
| CHLORIDE       | 106                      | 99 - 109 mmol/L   | [facility] |
+----------------+--------------------------+-------------------+-----------------+
| CO2            | 28                       | 23 - 32 mmol/L    | [facility] |
+----------------+--------------------------+-------------------+-----------------+
| ANION GAP AGAP | 10                       | 5 - 20 mmol/L     | [facility] |
+----------------+--------------------------+-------------------+-----------------+
| GLUCOSE        | 149 (H)                  | 65 - 99 mg/dL     | [facility] |
+----------------+--------------------------+-------------------+-----------------+
| BUN            | 14                       | 8 - 25 mg/dL      | [facility] |
+----------------+--------------------------+-------------------+-----------------+
| CREATININE     | 0.84                     | 0.70 - 1.30 mg/dL | [facility] |
+----------------+--------------------------+-------------------+-----------------+
| BUN/CREAT      | 17                       |                   | [facility] |
 
+----------------+--------------------------+-------------------+-----------------+
| CALCIUM        | 8.3 (L)                  | 8.5 - 10.5 mg/dL  | [facility] |
+----------------+--------------------------+-------------------+-----------------+
| TOTAL PROTEIN  | 7.1                      | 6.3 - 8.2 g/dL    | [facility] |
+----------------+--------------------------+-------------------+-----------------+
| Albumin        | 3.1 (L)                  | 3.3 - 4.8 g/dL    | [facility] |
+----------------+--------------------------+-------------------+-----------------+
| GLOBULIN       | 4.1                      | 1.3 - 4.9 g/dL    | [facility] |
+----------------+--------------------------+-------------------+-----------------+
| A/G            | 0.8 (L)                  | 1.0 - 2.4         | [facility] |
+----------------+--------------------------+-------------------+-----------------+
| TBIL           | 0.3                      | 0.1 - 1.5 mg/dL   | [facility] |
+----------------+--------------------------+-------------------+-----------------+
| ALK PHOS       | 80                       | 35 - 115 U/L      | [facility] |
+----------------+--------------------------+-------------------+-----------------+
| AST            | 19                       | 10 - 45 U/L       | [facility] |
+----------------+--------------------------+-------------------+-----------------+
| ALT            | 25                       | 10 - 65 U/L       | [facility] |
+----------------+--------------------------+-------------------+-----------------+
| EGFR           | >60Comment: GFR <60:     | >60 mL/min/1.73m2 | [facility] |
|                | CHRONIC KIDNEY DISEASE,  |                   |                 |
|                | IF FOUND OVER A 3 MONTH  |                   |                 |
|                | PERIOD.GFR <15: KIDNEY   |                   |                 |
|                | FAILURE.FOR       |                   |                 |
|                | [employer]IC[employer], MULTIPLY THE  |                   |                 |
|                | CALCULATED GFR BY        |                   |                 |
|                | 1.210.This eGFR is       |                   |                 |
|                | calculated using the     |                   |                 |
|                | MDRD IDMS traceable      |                   |                 |
|                | equation.Testing         |                   |                 |
|                | performed at [facility]     |                   |                 |
|                | [facility];[place], [place]   |                   |                 |
|                | [id number]                    |                   |                 |
+----------------+--------------------------+-------------------+-----------------+
 
 
 
+----------+
| Specimen |
+----------+
| Blood    |
+----------+
 
 
 
+-------------------+------------------+--------------------+--------------+
| Performing        | Address          | City/State/Zipcode | Phone Number |
| Organization      |                  |                    |              |
+-------------------+------------------+--------------------+--------------+
|   [facility] |   [address] | [place] [address] |              |
+-------------------+------------------+--------------------+--------------+
 CBC with differential (2018  2:35 PM)
 
+-----------------+-------------------------+-------------------+-----------------+
| Component       | Value                   | Ref Range         | Performed At    |
+-----------------+-------------------------+-------------------+-----------------+
| WBC             | 9.14                    | 3.80 - 11.00 K/uL | [facility] |
+-----------------+-------------------------+-------------------+-----------------+
| RBC             | 4.42                    | 4.20 - 5.70 M/uL  | [facility] |
+-----------------+-------------------------+-------------------+-----------------+
 
| HGB             | 14.0                    | 13.2 - 17.0 g/dL  | [facility] |
+-----------------+-------------------------+-------------------+-----------------+
| HCT             | 42.5                    | 39.0 - 50.0 %     | [facility] |
+-----------------+-------------------------+-------------------+-----------------+
| MCV             | 96.1                    | 80.0 - 100.0 fl   | [facility] |
+-----------------+-------------------------+-------------------+-----------------+
| MCH             | 31.6                    | 27.0 - 34.0 pg    | [facility] |
+-----------------+-------------------------+-------------------+-----------------+
| MCHC            | 32.9                    | 32.0 - 35.5 g/dL  | [facility] |
+-----------------+-------------------------+-------------------+-----------------+
| RDW SD          | 45.5                    | 37 - 53 fl        | [facility] |
+-----------------+-------------------------+-------------------+-----------------+
| PLT             | 317                     | 150 - 400 K/uL    | [facility] |
+-----------------+-------------------------+-------------------+-----------------+
| MPV             | 8.6                     | fl                | [facility] |
+-----------------+-------------------------+-------------------+-----------------+
| DIFF TYPE       | AUTOMATED               |                   | [facility] |
+-----------------+-------------------------+-------------------+-----------------+
| NEUTROPHILS     | 80.59                   | %                 | [facility] |
+-----------------+-------------------------+-------------------+-----------------+
| LYMPHOCYTES     | 11.41                   | %                 | [facility] |
+-----------------+-------------------------+-------------------+-----------------+
| MONOCYTES       | 5.80                    | %                 | [facility] |
+-----------------+-------------------------+-------------------+-----------------+
| EOSINOPHILS     | 1.45                    | %                 | [facility] |
+-----------------+-------------------------+-------------------+-----------------+
| BASOPHILS       | 0.75                    | %                 | [facility] |
+-----------------+-------------------------+-------------------+-----------------+
| NEUTROPHILS ABS | 7.36                    | 1.90 - 7.40 K/uL  | [facility] |
+-----------------+-------------------------+-------------------+-----------------+
| LYMPHOCYTES ABS | 1.04                    | 1.00 - 3.90 K/uL  | [facility] |
+-----------------+-------------------------+-------------------+-----------------+
| MONOCYTES ABS   | 0.53                    | 0.00 - 0.80 K/uL  | [facility] |
+-----------------+-------------------------+-------------------+-----------------+
| EOSINOPHILS ABS | 0.13                    | 0.00 - 0.50 K/uL  | [facility] |
+-----------------+-------------------------+-------------------+-----------------+
| BASOPHILS ABS   | 0.07Comment: Testing    | 0.00 - 0.10 K/uL  | [facility] |
|                 | performed at [facility];[address]    |                   |                 |
|                 | [address];[place]  |                   |                 |
|                 | [address]                   |                   |                 |
+-----------------+-------------------------+-------------------+-----------------+
 
 
 
+----------+
| Specimen |
+----------+
| Blood    |
+----------+
 
 
 
+-------------------+------------------+--------------------+--------------+
| Performing        | Address          | City/State/Zipcode | Phone Number |
| Organization      |                  |                    |              |
+-------------------+------------------+--------------------+--------------+
|   [facility] |   [address] | [place] [address] |              |
+-------------------+------------------+--------------------+--------------+
 EKG 12 LEAD UNIT PERFORMED (2018  2:33 PM)
 
 
+-------------------+-------------------------+-----------+--------------+
| Component         | Value                   | Ref Range | Performed At |
+-------------------+-------------------------+-----------+--------------+
| Ventricular Rate  | 90                      | BPM       | [facility]     |
+-------------------+-------------------------+-----------+--------------+
| Atrial Rate       | 90                      | BPM       | [facility]     |
+-------------------+-------------------------+-----------+--------------+
| P-R Interval      | 152                     | ms        | [facility]     |
+-------------------+-------------------------+-----------+--------------+
| QRS Duration      | 80                      | ms        | [facility]     |
+-------------------+-------------------------+-----------+--------------+
| Q-T Interval      | 354                     | ms        | [facility]     |
+-------------------+-------------------------+-----------+--------------+
| QTC Calculation   | 433                     | ms        | [facility]     |
| (Bezet)           |                         |           |              |
+-------------------+-------------------------+-----------+--------------+
| Calculated P Axis | 46                      | degrees   | [facility]     |
+-------------------+-------------------------+-----------+--------------+
| Calculated R Axis | 25                      | degrees   | [facility]     |
+-------------------+-------------------------+-----------+--------------+
| Calculated T Axis | 40                      | degrees   | [facility]     |
+-------------------+-------------------------+-----------+--------------+
| Diagnosis         | Normal sinus            |           | [facility]     |
|                   | rhythmNormal ECGWhen    |           |              |
|                   | compared with ECG of    |           |              |
|                   | 2018 17:31,No    |           |              |
|                   | significant change was  |           |              |
|                   | foundThis ECG contains  |           |              |
|                   | Unconfirmed             |           |              |
|                   | Interpretation          |           |              |
|                   | Statements.    See ED   |           |              |
|                   | Record for Physician    |           |              |
|                   | Interpretation.         |           |              |
|                   | Confirmed by MUSE READ  |           |              |
|                   | ONLY, -COMPUTER ([id number]),  |           |              |
|                   |  Meryl Kern  |           |              |
|                   | (79) on 2018       |           |              |
|                   | 2:52:05 AM              |           |              |
+-------------------+-------------------------+-----------+--------------+
 
 
 
+--------------+-------------------+--------------------+--------------+
| Performing   | Address           | City/State/Zipcode | Phone Number |
| Organization |                   |                    |              |
+--------------+-------------------+--------------------+--------------+
|   [facility]   |   [address] | [place] [address] |              |
+--------------+-------------------+--------------------+--------------+
 ED INFORMATION EXCHANGE (2018  1:59 PM)
 
+------------------------------------------------------------------------+--------------+
| Narrative                                                              | Performed At |
+------------------------------------------------------------------------+--------------+
|   [id number]     This patient has registered at the   |   ED         |
| [facility] Emergency Department   For more         | INFORMATION  |
| information visit:                                                     | EXCHANGE     |
| [email or web address] |              |
| [id number]   Security Events  No recent Security Events currently on file  |              |
|     ED Care Guidelines from [facility]  Last Updated: 18 |              |
|  9:04 AM         Additional Information:  Currently [place] [facility] |              |
 
|  [facility] Gaby  |              |
| Osgood [phone].Prescription medication goes through [facility], |              |
|  [phone].  These are guidelines and the provider should exercise  |              |
| clinical judgment when providing care.     Recent Emergency Department |              |
|  Visit Summary  Admit Date Facility City State Type Major Type         |              |
| Diagnoses or Chief Complaint   [facility]
| [place] [facility]    Emergency          increased confusion,         |              |
| dizziness, weakness, and coughing. pt has UTI, MRSA, and recent hx of  |              |
| pneumonia      Sep 7, 2018 [facility] [place]  |              |
| Emergency    Emergency    Chief Complaint: URO MALE      2018  |              |
| [facility] Emergency    Emergency                  |              |
|     Weakness        Shortness of Breath        Chronic obstructive     |              |
| pulmonary disease with (acute) exacerbation        Other forms of      |              |
| dyspnea      2018 [facility]               |              |
| Emergency    Emergency          Shortness of Breath        Weakness    |              |
|      Lymphedema, not elsewhere classified            E.D. Visit Count  |              |
| (12 mo.)  Facility Visits Low Acuity   [facility]       |              |
| System 1 0   [facility] 4 0   Total 5 0   Note:    |              |
| Visits indicate total known visits. Medicaid Low Acuity Dx are the     |              |
| number of primary diagnoses on the Medicaid's Low Acuity dx list.      |              |
|     Recent Inpatient Visit Summary  Admit Date Facility [facility] State     |              |
| Type Major Type Diagnoses or Chief Complaint   2018 [facility]     |              |
| [facility] General Medicine    Inpatient          Other   |              |
| forms of dyspnea        Chronic obstructive pulmonary disease with     |              |
| (acute) exacerbation        Hypoxemia        Other specified soft      |              |
| tissue disorders            PDMP Report  PDMP query found no report.   |              |
|    Care Providers  Provider PRC Type Phone Fax Service Dates   SERMON, |              |
|  VINCE PHAN Physician Assistant [phone]       |              |
| Current      Kyaw Song ONOFRE  ([phone])        |              |
| [phone] Current      [facility]      |              |
| Health Provider [phone]    Current         Known Aliases  No    |              |
| known aliases.   Criteria met         Care Guidelines     The above    |              |
| information is provided for the sole purpose of patient treatment. Use |              |
|  of this information beyond the terms of Data Sharing Memorandum of    |              |
| Understanding and License Agreement is prohibited. In certain cases    |              |
| not all visits may be represented.   Consult the aforementioned        |              |
| facilities for additional information.   2018 [facility]       |              |
| [email or web address] - [place], [place] -                              |              |
| [email or web address]                                         |              |
+------------------------------------------------------------------------+--------------+
 
 
 
+-------------------------------------------------------------------------------------------
--------------------------------------------------------------------------------------------
--------------------+
| Procedure Note                                                                            
                                                                                            
                    |
+-------------------------------------------------------------------------------------------
--------------------------------------------------------------------------------------------
--------------------+
|   Interface, Lab - 2018  2:00 PM PDT  Formatting of this note may be different      
                                                                                            
                    |
| from the original.[email or web address] [id number] patient has registered at the [facility]    
                                                                                            
                    |
| [facility] Emergency Department For more information visit:                  
                                                                                            
 
                    |
| [email or web address] Security     
                                                                                            
                    |
| EventsNo recent Security Events currently on fileED Care Guidelines from [employer] -       
                                                                                            
                    |
| [place] Updated: 18 9:04 AM  Additional Information:Currently placed at         
                                                                                            
                    |
| [facility], please contact Candace Osgood      
                                                                                            
                    |
| [phone].Prescription medication goes through [facility], [phone].These are    
                                                                                            
                    |
| guidelines and the provider should exercise clinical judgment when providing care.Recent  
                                                                                            
                    |
|  Emergency Department Visit SummaryAdmit Date Facility City State Type Major Type         
                                                                                            
                    |
| Diagnoses or Chief Complaint Sep 16, 2018 [facility] Emergency        
                                                                                            
                    |
| Emergency    increased confusion, dizziness, weakness, and coughing. pt has UTI, MRSA,    
                                                                                            
                    |
| and recent hx of pneumonia  Sep 7, 2018 [facility] [place]        
                                                                                            
                    |
| Emergency  Emergency  Chief Complaint: URO MALE  2018 [facility]        
                                                                                            
                    |
| [facility] Emergency  Emergency    Weakness    Shortness of Breath    Chronic obstructive  
                                                                                            
                    |
|  pulmonary disease with (acute) exacerbation    Other forms of dyspnea  2018      
                                                                                            
                    |
| [facility] Emergency  Emergency    Shortness of Breath    Weakness    
                                                                                            
                    |
|   Lymphedema, not elsewhere classified  E.D. Visit Count (12 mo.)Facility Visits Low      
                                                                                            
                    |
| [facility] 1 0 [facility] 4 0 Total 5 0  
                                                                                            
                    |
|  Note: Visits indicate total known visits. Medicaid Low Acuity Dx are the number of       
                                                                                            
                    |
| primary diagnoses on the Medicaid's Low Acuity dx list.  Recent Inpatient Visit           
                                                                                            
                    |
| SummaryAdmit Date Facility City State Type Major Type Diagnoses or Chief Complaint Jul    
                                                                                            
                    |
| 2018 [facility] General Medicine  Inpatient    Other forms of     
                                                                                            
 
                    |
| dyspnea    Chronic obstructive pulmonary disease with (acute) exacerbation    Hypoxemia   
                                                                                            
                    |
|    Other specified soft tissue disorders  PDMP ReportPDMP query found no report.Care      
                                                                                            
                    |
| ProvidersProvider PRC Type Phone Fax Service Dates MAI GUO [place] Physician        
                                                                                            
                    |
| Assistant [phone] Current  [facility]    
                                                                                            
                    |
| Worker [phone] Current  [facility]         
                                                                                            
                    |
| Provider [phone]  Current  Known AliasesNo known aliases. Criteria met  Care       
                                                                                            
                    |
| GuidelinesThe above information is provided for the sole purpose of patient treatment.    
                                                                                            
                    |
| Use of this information beyond the terms of Data Sharing Memorandum of Understanding and  
                                                                                            
                    |
|  License Agreement is prohibited. In certain cases not all visits may be represented.     
                                                                                            
                    |
| Consult the aforementioned facilities for additional information. 2018 [employer]         
                                                                                            
                    |
| [employer] - [place], [place] - [email or web address]          
                                                                                            
                    |
|E.D. Visit Count (12 mo.)                                                                  
                                                                                            
                   |
|Facility Visits Low Acuity                                                                 
                                                                                            
                    |
|[facility] 1 0                                                       
                                                                                            
                    |
|[facility] 4 0                                                         
                                                                                            
                    |
|Total 5 0                                                                                  
                                                                                            
                    |
|Note: Visits indicate total known visits. Medicaid Low Acuity Dx are the number of primary 
diagnoses on the Medicaid's Low Acuity dx list.                                             
                    |
|                                                                                           
                                                                                            
                    |
|Recent Inpatient Visit Summary                                                             
                                                                                            
                    |
|Admit Date Facility City State Type Major Type Diagnoses or Chief Complaint                
                                                                                            
 
                    |
|2018 [facility] [place] General Medicine  Inpatient                    
                                                                                            
                    |
|  Other forms of dyspnea                                                                   
                                                                                            
                    |
|   Chronic obstructive pulmonary disease with (acute) exacerbation                         
                                                                                            
                    |
|   Hypoxemia                                                                               
                                                                                            
                    |
|   Other specified soft tissue disorders                                                   
                                                                                            
                    |
|                                                                                           
                                                                                            
                    |
|                                                                                           
                                                                                            
                    |
|                                                                                           
                                                                                            
                    |
|PDMP Report                                                                                
                                                                                            
                    |
|PDMP query found no report.                                                                
                                                                                            
                    |
|                                                                                           
                                                                                            
                    |
|Care Providers                                                                             
                                                                                            
                    |
|Provider PRC Type Phone Fax Service Dates                                                  
                                                                                            
                    |
|MAI GUO [place] Physician Assistant [phone] Current            
                                                                                            
                    |
|[facility]  [phone] Current         
                                                                                            
                    |
|[facility] Provider [phone]  Current                       
                                                                                            
                    |
|                                                                                           
                                                                                            
                    |
|Known Aliases                                                                              
                                                                                            
                    |
|No known aliases.                                                                          
                                                                                            
                    |
|Criteria met                                                                               
                                                                                            
 
                    |
|                                                                                           
                                                                                            
                    |
|  Care Guidelines                                                                          
                                                                                            
                    |
|                                                                                           
                                                                                            
                    |
|The above information is provided for the sole purpose of patient treatment. Use of this in
formation beyond the terms of Data Sharing Memorandum of Understanding and License Agreement
 is prohibited. In  |
|certain cases not all visits may be represented. Consult the aforementioned facilities for 
additional information.                                                                     
                    |
|2018 [employer] - [place], [place] - [email or web address]                                                                                       
                    |
+-------------------------------------------------------------------------------------------
--------------------------------------------------------------------------------------------
--------------------+
 
 
 
+-------------------+---------+--------------------+--------------+
| Performing        | Address | City/State/Zipcode | Phone Number |
| Organization      |         |                    |              |
+-------------------+---------+--------------------+--------------+
|   ED INFORMATION  |         |                    |              |
| EXCHANGE          |         |                    |              |
+-------------------+---------+--------------------+--------------+
 in this encounter
 
 Visit Diagnoses
 
 
+-------------------------------------------------------------------------------+
| Diagnosis                                                                     |
+-------------------------------------------------------------------------------+
| Fall, initial encounter - Primary                                             |
+-------------------------------------------------------------------------------+
| Closed compression fracture of first lumbar vertebra, initial encounter (HCC) |
+-------------------------------------------------------------------------------+
 
 
 
 Admitting Diagnoses
 
 
+-------------------------------------------------------------------------------+
| Diagnosis                                                                     |
+-------------------------------------------------------------------------------+
| Fall, initial encounter                                                       |
+-------------------------------------------------------------------------------+
| Closed compression fracture of first lumbar vertebra, initial encounter (HCC) |
+-------------------------------------------------------------------------------+
 
 
 
 
 Administered Medications
 
 
+-----------------------------------+--------+----------+-------+------+------+
| Medication Order                  | MAR    | Action   | Dose  | Rate | Site |
|                                   | Action | Date     |       |      |      |
+-----------------------------------+--------+----------+-------+------+------+
|   ipratropium-albuterol (DUO-NEB) | Given  |  | 3 mLs |      |      |
|  0.5-2.5 mg/3mL nebulizer         |        | 8 15:17  |       |      |      |
| solution 3 mL  3 mL,              |        | PDT      |       |      |      |
| Nebulization, Once RT, Sun        |        |          |       |      |      |
| 18 at 1432, For 1 dose       |        |          |       |      |      |
+-----------------------------------+--------+----------+-------+------+------+
 
 
 
+-----------------------------------+---+
|                                   |   |
+-----------------------------------+---+
|   sodium chloride 0.9 % flush 10  |   |
| mL  10 mL, Intravenous, Every 8   |   |
| Hours, First dose on Sun 18  |   |
| at 1432                           |   |
+-----------------------------------+---+
|                                   |   |
+-----------------------------------+---+
 in this encounter

## 2018-09-18 NOTE — XMS
Encounter Summary
  Created on: 2018
 
 [place] Jp RIVERA
 External Reference #: [id number]
 : 48
 Sex: Male
 
 Demographics
 
 
+-----------------------+----------------------+
| Address               | [address]          |
|                       | [place]  [address] |
+-----------------------+----------------------+
| Home Phone            | [phone]      |
+-----------------------+----------------------+
| Preferred Language    | Unknown              |
+-----------------------+----------------------+
| Marital Status        |              |
+-----------------------+----------------------+
| Denominational Affiliation | 1041                 |
+-----------------------+----------------------+
| Race                  | Unknown              |
+-----------------------+----------------------+
| Ethnic Group          | Unknown              |
+-----------------------+----------------------+
 
 
 Author
 
 
+--------------+-----------------------+
| Author       | [facility] |
+--------------+-----------------------+
| Organization | [facility] |
+--------------+-----------------------+
| Address      | Unknown               |
+--------------+-----------------------+
| Phone        | Unavailable           |
+--------------+-----------------------+
 
 
 
 Support
 
 
+---------------+--------------+---------+-----------------+
| Name          | Relationship | Address | Phone           |
+---------------+--------------+---------+-----------------+
| Delilah Hall | ECON         | Unknown | [phone] |
+---------------+--------------+---------+-----------------+
| Tesha Veloz   | ECON         | Unknown | [phone] |
+---------------+--------------+---------+-----------------+
 
 
 
 Care Team Providers
 
 
 
+-----------------------+------+-----------------+
| Care Team Member Name | Role | Phone           |
+-----------------------+------+-----------------+
| Kirby Guo  | PCP  | [phone] |
+-----------------------+------+-----------------+
 
 
 
 Reason for Visit
 
 
+--------------------+----------------------------+
| Reason             | Comments                   |
+--------------------+----------------------------+
| Medication Problem | Prior auth, Duo neb, [employer] |
+--------------------+----------------------------+
 
 
 
 Encounter Details
 
 
+--------+-----------+----------------------+---------------------+---------------------+
| Date   | Type      | Department           | Care Team           | Description         |
+--------+-----------+----------------------+---------------------+---------------------+
| / | Telephone |   [facility] PHYSICIAN     |   Mary Castellanos  | Medication Problem  |
|    |           | [facility]    | T, RN               | (Prior auth, Duo    |
|        |           | [address]       |                     | [place]ber[place])         |
|        |           | [place] [address]   |                     |                     |
|        |           | [phone]         |                     |                     |
+--------+-----------+----------------------+---------------------+---------------------+
 
 
 
 Social History
 
 
+---------------+-------+-----------+--------+------------------+
| Tobacco Use   | Types | Packs/Day | Years  | Date             |
|               |       |           | Used   |                  |
+---------------+-------+-----------+--------+------------------+
| Former Smoker |       | 3         | 20     | Quit: 1998 |
+---------------+-------+-----------+--------+------------------+
 
 
 
+---------------------+---+---+---+
| Smokeless Tobacco:  |   |   |   |
| Never Used          |   |   |   |
+---------------------+---+---+---+
 
 
 
+-------------+-----------+---------+----------+
| Alcohol Use | Drinks/We | oz/Week | Comments |
|             | ek        |         |          |
+-------------+-----------+---------+----------+
| No          |           |         |          |
 
+-------------+-----------+---------+----------+
 
 
 
+------------------+---------------+
| Sex Assigned at  | Date Recorded |
| Birth            |               |
+------------------+---------------+
| Not on file      |               |
+------------------+---------------+
 as of this encounter
 
 Plan of Treatment
 
 
+--------+---------+-------------+----------------------+-------------+
| Date   | Type    | Specialty   | Care Team            | Description |
+--------+---------+-------------+----------------------+-------------+
| 10/19/ | Office  | Pulmonology |   Ben,          |             |
|    | Visit   |             | Edwina Mckeon,   |             |
|        |         |             | MD [address] |             |
|        |         |             [place] [address] |             |
|        |         |             |   [phone]       |             |
|        |         |             | [phone] (Fax)   |             |
+--------+---------+-------------+----------------------+-------------+
 as of this encounter
 
 Visit Diagnoses
 Not on filein this encounter

## 2018-09-18 NOTE — XMS
Encounter Summary
  Created on: 2018
 
 [facility]
 External Reference #: [id number]
 : 48
 Sex: Male
 
 Demographics
 
 
+-----------------------+----------------------+
| Address               | [address]          |
|                       | [place]  [address] |
+-----------------------+----------------------+
| Home Phone            | [phone]      |
+-----------------------+----------------------+
| Preferred Language    | Unknown              |
+-----------------------+----------------------+
| Marital Status        |              |
+-----------------------+----------------------+
| Buddhism Affiliation | 1041                 |
+-----------------------+----------------------+
| Race                  | Unknown              |
+-----------------------+----------------------+
| Ethnic Group          | Unknown              |
+-----------------------+----------------------+
 
 
 Author
 
 
+--------------+-----------------------+
| Author       | [facility] |
+--------------+-----------------------+
| Organization | [facility] |
+--------------+-----------------------+
| Address      | Unknown               |
+--------------+-----------------------+
| Phone        | Unavailable           |
+--------------+-----------------------+
 
 
 
 Support
 
 
+---------------+--------------+---------+-----------------+
| Name          | Relationship | Address | Phone           |
+---------------+--------------+---------+-----------------+
| Delilah Hall | ECON         | Unknown | [phone] |
+---------------+--------------+---------+-----------------+
| Tesha Veloz   | ECON         | Unknown | [phone] |
+---------------+--------------+---------+-----------------+
 
 
 
 Care Team Providers
 
 
 
+-----------------------+------+-----------------+
| Care Team Member Name | Role | Phone           |
+-----------------------+------+-----------------+
| Kirby Guo  | PCP  | [phone] |
+-----------------------+------+-----------------+
 
 
 
 Reason for Visit
 
 
+--------------------+----------------------------+
| Reason             | Comments                   |
+--------------------+----------------------------+
| Medication Problem | Prior auth, Duo neb, [employer] |
+--------------------+----------------------------+
 
 
 
 Encounter Details
 
 
+--------+-----------+----------------------+---------------------+---------------------+
| Date   | Type      | Department           | Care Team           | Description         |
+--------+-----------+----------------------+---------------------+---------------------+
| / | Telephone |   [facility]YS[facility]     |   Mary Castellanos  | Medication Problem  |
|    |           | [facility]    | T, RN               | (Prior auth, Duo    |
|        |           | [address]       |                     | [place] roberto)         |
|        |           | [place] [address]   |                     |                     |
|        |           | [phone]         |                     |                     |
+--------+-----------+----------------------+---------------------+---------------------+
 
 
 
 Social History
 
 
+---------------+-------+-----------+--------+------------------+
| Tobacco Use   | Types | Packs/Day | Years  | Date             |
|               |       |           | Used   |                  |
+---------------+-------+-----------+--------+------------------+
| Former Smoker |       | 3         | 20     | Quit: 1998 |
+---------------+-------+-----------+--------+------------------+
 
 
 
+---------------------+---+---+---+
| Smokeless Tobacco:  |   |   |   |
| Never Used          |   |   |   |
+---------------------+---+---+---+
 
 
 
+-------------+-----------+---------+----------+
| Alcohol Use | Drinks/We | oz/Week | Comments |
|             | ek        |         |          |
+-------------+-----------+---------+----------+
| No          |           |         |          |
 
+-------------+-----------+---------+----------+
 
 
 
+------------------+---------------+
| Sex Assigned at  | Date Recorded |
| Birth            |               |
+------------------+---------------+
| Not on file      |               |
+------------------+---------------+
 as of this encounter
 
 Plan of Treatment
 
 
+--------+---------+-------------+----------------------+-------------+
| Date   | Type    | Specialty   | Care Team            | Description |
+--------+---------+-------------+----------------------+-------------+
| 10/19/ | Office  | Pulmonology |   Ben,          |             |
|    | Visit   |             | Edwina Mckeon,   |             |
|        |         |             | MD [address] |             |
|        |         |             [place] [address] |             |
|        |         |             |   [phone]       |             |
|        |         |             | [phone] (Fax)   |             |
+--------+---------+-------------+----------------------+-------------+
 as of this encounter
 
 Visit Diagnoses
 Not on filein this encounter

## 2018-09-18 NOTE — XMS
Encounter Summary
  Created on: 2018
 
 Vince Jp RIVERA
 External Reference #: [id number]
 : 48
 Sex: Male
 
 Demographics
 
 
+-----------------------+----------------------+
| Address               | [address]          |
|                       | [place]  [address] |
+-----------------------+----------------------+
| Home Phone            | [phone]      |
+-----------------------+----------------------+
| Preferred Language    | Unknown              |
+-----------------------+----------------------+
| Marital Status        |              |
+-----------------------+----------------------+
| Episcopalian Affiliation | [id number]                 |
+-----------------------+----------------------+
| Race                  | Unknown              |
+-----------------------+----------------------+
| Ethnic Group          | Unknown              |
+-----------------------+----------------------+
 
 
 Author
 
 
+--------------+-----------------------+
| Author       | [facility] |
+--------------+-----------------------+
| Organization | [facility] |
+--------------+-----------------------+
| Address      | Unknown               |
+--------------+-----------------------+
| Phone        | Unavailable           |
+--------------+-----------------------+
 
 
 
 Support
 
 
+---------------+--------------+---------+-----------------+
| Name          | Relationship | Address | Phone           |
+---------------+--------------+---------+-----------------+
| Delilah Hall | ECON         | Unknown | [phone] |
+---------------+--------------+---------+-----------------+
| Tesha Veloz   | ECON         | Unknown | [phone] |
+---------------+--------------+---------+-----------------+
 
 
 
 Care Team Providers
 
 
 
+-----------------------+------+-----------------+
| Care Team Member Name | Role | Phone           |
+-----------------------+------+-----------------+
| Kirby Guo  | PCP  | [phone] |
+-----------------------+------+-----------------+
 
 
 
 Reason for Visit
 
 
+---------------------+----------+
| Reason              | Comments |
+---------------------+----------+
| Shortness of Breath |          |
+---------------------+----------+
| Weakness            |          |
+---------------------+----------+
 Auth/Cert
 
+--------+--------+-----------+--------------+--------------+---------------+
| Status | Reason | Specialty | Diagnoses /  | Referred By  | Referred To   |
|        |        |           | Procedures   | Contact      | Contact       |
+--------+--------+-----------+--------------+--------------+---------------+
|        |        | Internal  |   Diagnoses  |              |   [facility]    |
|        |        | Medicine  |  Dyspnea on  |              | [address]   |
|        |        |           | exertion     |              | [address]  [address] |
|        |        |           | COPD with    |              |  [address]   |
|        |        |           | acute        |              | [place], [place]  |
|        |        |           | exacerbation |              | [address]  Phone: |
|        |        |           |  (Formerly [facility])       |              |  [phone] |
+--------+--------+-----------+--------------+--------------+---------------+
 
 
 
 
 Encounter Details
 
 
+--------+-----------+----------------------+----------------------+----------------------+
| Date   | Type      | Department           | Care Team            | Description          |
+--------+-----------+----------------------+----------------------+----------------------+
| / | Hospital  |   [facility]    |   Pedro Luis Herrera  | COPD with acute      |
| 2018 - | Encounter | [facility]   | D, DO  [address]     | exacerbation (HCC)   |
|        |           | [address] [facility] | Blvd  [place], [place]   | (Primary Dx);        |
| / |           |   [address]     | [address]  [phone]  | Dyspnea on exertion; |
|    |           | [place] [address]   |  Eliane De Oliveira DO    |  Hypoxemia; Leg      |
|        |           | [phone]         | [address]       | swelling             |
|        |           |                      | [place] [address]   |                      |
|        |           |                      | [phone]         |                      |
|        |           |                      | [phone] (Fax)   |                      |
|        |           |                      | Onur Gates MD  [address]   |                      |
|        |           |                      | [address]           |                      |
|        |           |                      | [place] [address]   |                      |
|        |           |                      | [phone]         |                      |
|        |           |                      | [phone] (Fax)   |                      |
+--------+-----------+----------------------+----------------------+----------------------+
 
 
 
 
 Social History
 
 
+---------------+-------+-----------+--------+------------------+
| Tobacco Use   | Types | Packs/Day | Years  | Date             |
|               |       |           | Used   |                  |
+---------------+-------+-----------+--------+------------------+
| Former Smoker |       | 3         | 20     | Quit: 1998 |
+---------------+-------+-----------+--------+------------------+
 
 
 
+---------------------+---+---+---+
| Smokeless Tobacco:  |   |   |   |
| Never Used          |   |   |   |
+---------------------+---+---+---+
 
 
 
+-------------+-----------+---------+----------+
| Alcohol Use | Drinks/We | oz/Week | Comments |
|             | ek        |         |          |
+-------------+-----------+---------+----------+
| No          |           |         |          |
+-------------+-----------+---------+----------+
 
 
 
+------------------+---------------+
| Sex Assigned at  | Date Recorded |
| Birth            |               |
+------------------+---------------+
| Not on file      |               |
+------------------+---------------+
 as of this encounter
 
 Last Filed Vital Signs
 
 
+-------------------+----------------------+-------------------------+
| Vital Sign        | Reading              | Time Taken              |
+-------------------+----------------------+-------------------------+
| Blood Pressure    | 111/68               | 2018 12:30 PM PDT |
+-------------------+----------------------+-------------------------+
| Pulse             | 71                   | 2018 12:30 PM PDT |
+-------------------+----------------------+-------------------------+
| Temperature       | 37.1   C (98.8   F)  | 2018 12:30 PM PDT |
+-------------------+----------------------+-------------------------+
| Respiratory Rate  | 20                   | 2018 12:30 PM PDT |
+-------------------+----------------------+-------------------------+
| Oxygen Saturation | 91%                  | 2018 12:30 PM PDT |
+-------------------+----------------------+-------------------------+
| Inhaled Oxygen    | -                    | -                       |
| Concentration     |                      |                         |
+-------------------+----------------------+-------------------------+
| Weight            | 99.7 kg (219 lb 12.8 | 2018  2:58 AM PDT |
|                   |  oz)                 |                         |
+-------------------+----------------------+-------------------------+
 
| Height            | 175.3 cm (5' 9")     | 2018  8:00 PM PDT |
+-------------------+----------------------+-------------------------+
| Body Mass Index   | 32.46                | 2018  2:58 AM PDT |
+-------------------+----------------------+-------------------------+
 in this encounter
 
 Discharge Summaries
 Onur Gates MD - 2018 12:47 PM PDTFormatting of this note may be different from the or
[facility].
 [facility]  
 Service:  Hospitalist
 Discharge Summary
 
 Date of Admission:  2018
 Date of Discharge:  2018
 
 Discharge Provider:  Onur Gates MD
 Treatment Team: 
 Admitting Provider: Eliane De Oliveira DO
 
 Discharge Diagnoses:  
 
 Principal Problem:
   Hypoxemia
 Active Problems:
   BMI 31.0-31.9,adult
   Schizoaffective disorder (HCC)
   Leg swelling
   COPD (chronic obstructive pulmonary disease) (HCC)
 Resolved Problems:
   * No resolved hospital problems. *
 
 Final Diagnoses:   Acute on chronic hypoxic respiratory failure
    CAP
    EMELINA, nocturnal oxygen use but refuses CPAP
    COPD
    Schizoaffective disorder
    Persistent leukocytosis due to prednisone
 
 Procedures:  * No surgery found *
 
 Significant Diagnostic Studies:  
 Xr Chest Pa And Lateral
 
 Result Date: 2018
 Bilateral pulmonary vascular congestion. Bibasilar atelectasis. Electronically signed by Is
franck Blandon MD on 2018 7:10 PM
 
 Cta Chest Pulmonary Embolism W Iv Con
 
 Result Date: 2018
 1. Some images are degraded due to motion artifact. No obvious pulmonary emboli. 2. Emphyse
ma with pulmonary vascular congestion and bilateral infiltrate or atelectasis, right greater
 than left. 3. Dilated ascending aorta measuring 4.4 cm. No aortic dissection. 4. Marked tra
cheomalacia. RADIA  Electronically signed by Alex Williamson MD on 2018 12:56AM Referrin
krishan Provider Line: [phone] ID: [id number]
 
 Us Lower Extremity Venous Doppler Bilateral
 
 Result Date: 2018
 
 1.  No evidence of lower extremity deep vein thrombosis. Electronically signed by Mega barros MD on 2018 9:48 PM
 
 BRIEF HISTORY OF PRESENTATION:  
      Jp Clarke is a 68 y.o. male with PMH significant for pHTN, COPD, EMELINA, schiz
oaffective disorder admitted for SOB.  Pt is a resident of a group home in [place]. Pt use
s nocturnal o2 but refuses CPAP.  Pt sees Dr. Contreras.  Pt wishes to be DNR/DNI.  Please re
parish to H&P for full details.
 HOSPITAL COURSE:  
      Pt has been having SOB, COTA, orthopnea, edema in legs, productive cough, wheezing.  Pt
 had CTA chest that was negative for PE but showed emphysema with pulmonary vascular congest
ion and bilateral infiltrate or atelectasis and marked tracheomalacia.  RVP was negative, sp
utum culture was not a good specimen.  procalcitonin was 0.93.  It was felt that his symptom
s were due to bilateral PNA with diastolic CHF exacerbation with underlying pulm HTN.  Pt wa
s started on IV levaquin and was diuresed.  Pt also started on prednisone for concern for CO
PD exacerbation but this is felt less likely will taper off the prednisone quickly.  Pt stil
l with coarse breath sounds on day of discharge but otherwise hypoxia resolved back to basel
ine and pt clinically improved.  Pt was initially requiring 4L during the day.  On day of di
marvinrkarla, pt on RA during the day and 2L at night.  Pt can return to adult family home.
 Past Medical History 
 Diagnosis Date 
   Asthma with COPD (chronic obstructive pulmonary disease) ([facility]ly [facility]) 2018 
   Back injury  
   Bleeding ulcer  
   COPD (chronic obstructive pulmonary disease) (Formerly [facility])  
   Head injury  
   EMELINA (obstructive sleep apnea) 2018 
   Other chronic pain  
   Schizoaffective disorder ([facility])  
 
 History reviewed. No pertinent surgical history.
 
 Allergies 
 Allergen Reactions 
   Peanut Oil Shortness of Breath and Hives 
 
 Prescriptions Prior to Admission 
 Medication Sig Dispense Refill Last Dose 
   Acetaminophen 500 MG coapsule Take 1,000 mg by mouth 4 (four) times daily.   2018@
1200 
   albuterol (PROVENTIL HFA;VENTOLIN HFA) 108 (90 Base) MCG/ACT inhaler Inhale 2 puffs int
o the lungs every 4 (four) hours as needed for Wheezing.   2018 
   budesonide (PULMICORT) 0.5 MG/2ML nebulizer suspension Take 0.5 mg by nebulization 2 (t
wo) times daily.   2018@0800 
   buPROPion (WELLBUTRIN XL) 300 MG 24 hr tablet Take 300 mg by mouth every morning.   @0800 
   dextromethorphan polistirex (DELSYM) 30 MG/5ML ER suspension Take 60 mg by mouth every 
12 (twelve) hours as needed for Cough.   2018@PM 
   ergocalciferol (DRISDOL) 61434 units capsule Take 50,000 Units by mouth once a week.   
2018 
   furosemide (LASIX) 20 MG tablet Take 20 mg by mouth daily.   2018@AM 
   guaifenesin (MUCINEX MAX) 1200 MG 12hr tablet Take 1,200 mg by mouth every 6 (six) hour
s as needed.   2018@PM 
   L-methylfolate (DEPLIN) 15 MG tablet Take 15 mg by mouth daily with breakfast.   
018@0800 
   lurasidone (LATUDA) 40 MG tablet Take 40 mg by mouth daily.   2018@1700 
   Methylcobalamin (METHYL B-12 PO) Take 5,000 mcg by mouth 2 (two) times daily.   20
18@0800 
   OLANZapine (ZYPREXA) 20 MG tablet Take 10 mg by mouth 2 (two) times daily.   2018@
0800 
 
   omeprazole (PRILOSEC) 20 MG capsule Take 20 mg by mouth 2 (two) times daily.   
8@AM 
   oxycodone (OXY-IR) 5 MG capsule Take 1 capsule by mouth every 6 (six) hours as needed. 
30 capsule 0 2 weeks ago 
   polyethylene glycol (GLYCOLAX) packet Take 17 g by mouth daily.   2018@0800 
   propranolol (INDERAL) 20 MG tablet Take 20 mg by mouth 2 (two) times daily.   2018
@0800 
   sertraline (ZOLOFT) 100 MG tablet Take  mg by mouth See Admin Instructions. Take 
100 mg by mouth every morning and 50 mg by mouth in the afternoon   2018@0800 
   simvastatin (ZOCOR) 40 MG tablet Take 40 mg by mouth nightly.   2018@PM 
   topiramate (TOPAMAX) 25 MG tablet Take 25 mg by mouth 2 (two) times daily.   2018@
0700 
   umeclidinium-vilanterol (ANORO ELLIPTA) 62.5-25 MCG/INH inhaler Inhale 1 puff into the 
lungs daily. 1 each 11 2018@0800 
 
 DISCHARGE EXAM
 Vital Signs:
 /68 (BP Location: Left upper arm)  | Pulse 71  | Temp 98.8 F (37.1 C) (Oral)  | R
tara 20  | Ht 1.753 m (5' 9")  | Wt 99.7 kg (219 lb 12.8 oz)  | SpO2 91%  | BMI 32.46 kg/m 
 Temp:  [97.7 F (36.5 C)-98.8 F (37.1 C)] 98.8 F (37.1 C) ( 1230)
 BP: (101-122)/(64-75) 111/68 ( 1230)
 Heart Rate:  [68-87] 71 ( 1230)
 Resp:  [16-20] 20 ( 1230)
 SpO2:  [91 %-95 %] 91 % ( 1230)
 Weight:  [99.7 kg (219 lb 12.8 oz)] 99.7 kg (219 lb 12.8 oz) ( 0258)
 
 Physical Exam 
 Constitutional: He is oriented to person, place, and time. He appears well-developed and we
ll-nourished. No distress. 
 HENT: 
 Head: Normocephalic and atraumatic. 
 Eyes: Pupils are equal, round, and reactive to light. EOM are normal. 
 Cardiovascular: Normal rate, regular rhythm and normal heart sounds.  
 No murmur heard.
 Pulmonary/Chest: No respiratory distress. He has no wheezes. 
 Coarse breath sounds at bases but with adequate air exchange 
 Abdomina/Gl: Soft. Bowel sounds are normal. He exhibits no distension. There is no tenderne
ss. 
 Musculoskeletal: Normal range of motion. He exhibits no edema. 
 Neurological: He is alert and oriented to person, place, and time. 
 Skin: Skin is warm and dry. 
 
 DATA
 
 CBC:  
 Lab Results 
 Component Value Date 
  WBC 11.28 (H) 2018 
  RBC 4.04 (L) 2018 
  HGB 13.1 (L) 2018 
  HCT 38.8 (L) 2018 
  MCV 96.1 2018 
  MCH 32.3 2018 
  MCHC 33.6 2018 
  RDW 48.6 2018 
   2018 
  MPV 9.9 2018 
  DIFFTYPE AUTOMATED 2018 
 
 CMP:  
 
 Lab Results 
 Component Value Date 
   2018 
  K 3.4 (L) 2018 
   2018 
  CO2 26 2018 
  ANIONGAP 14 2018 
  GLUF 106 (H) 2018 
  BUN 17 2018 
  CREATININE 0.8 2018 
  BCR 21 2018 
  CA 8.8 2018 
  PROT 6.8 2018 
  ALB 2.8 (L) 2018 
  GLOB 4.0 2018 
  BILITOT 0.2 2018 
  ALP 76 2018 
  AST 20 2018 
  ALT 26 2018 
  EGFR >60 2018 
 
 Magnesium:  
 Lab Results 
 Component Value Date 
  MG 2.3 2018 
 
 Phosphorus:  
 Lab Results 
 Component Value Date 
  PHOS 2.1 (L) 2018 
 
 Last 3 Troponin:  
 Lab Results 
 Component Value Date 
  TROPONINI <0.020 2018 
 
 Results  
  Procedure Component Value Units Date/Time 
  Sputum culture [[id number]] Collected:  18 0800 
  Specimen:  Sputum from Sputum Updated:  18 1513 
   Specimen Description SPUTUM 
   GRAM STAIN GREATER THAN 10 SEC/LPF 
   GRAM STAIN LESS THAN 10 WBCS/LPF 
   GRAM STAIN 3+ 
   GRAM STAIN GRAM POSITIVE COCCI 
   CULTURE SMEAR CONTAINS GREATER THAN 10 SEC/LPF SUGGESTIVE OF POOR QUALITY SPECIMEN.  SPEC
IMEN WILL NOT BE CULTURED OR WILL BE CULTURED BY SPECIAL REQUEST ONLY.  PLEASE RECOLLECT IF 
CLINICALLY INDICATED.  SPECIMEN WILL BE HELD 48 HOURS. 
  Respiratory Filmarray [[id number]] Collected:  18 2234 
  Specimen:  Nasopharyngeal Culture Updated:  18 0337 
   ADENOVIRUS Not Detected 
   CORONAVIRUS 229E Not Detected 
   CORONAVIRUS HKU1 Not Detected 
   CORONAVIRUS NL63 Not Detected 
   CORONAVIRUS OC43 Not Detected 
   HUMAN METAPNEUMOVIRUS Not Detected 
   HUMAN RHINO/ENTERO Not Detected 
   INFLUENZA A Not Detected 
 
   INFLUENZA B Not Detected 
   PARAINFLUENZA 1 Not Detected 
   PARAINFLUENZA 2 Not Detected 
   PARAINFLUENZA 3 Not Detected 
   PARAINFLUENZA 4 Not Detected 
   RESP SYNCYTIAL VIRUS Not Detected 
   BORDETELLA PERTUSSIS Not Detected 
   CHLAMYDIAE PNEUMONIAE Not Detected 
   MYCOPLASMA PNEUMONIAE Not Detected 
   RESP PANEL INTERP Testing performed by Molecular Methodology 
  
 
 PLAN
 
 1. Return to [facility]
 2. Complete antibiotics
 
 Disposition:  [facility]
 Condition:  Improved/good
 Code Status:  DNR/DNI
 
 No discharge procedures on file.
 
 Follow up:
 Per Pt None
 
  
 Medication List 
  
 START taking these medications  
 ipratropium-albuterol 0.5-2.5 mg/3mL
 QTY:  360 mL
 Refills:  0
 Commonly known as:  DUO-NEB
 Every 4 hours x 5 days then Q4H prn for SOB or wheezing
  
 levofloxacin 750 MG tablet
 QTY:  5 tablet
 Refills:  0
 Commonly known as:  LEVAQUIN
 Take 1 tablet by mouth daily.
  
 predniSONE 20 MG tablet
 QTY:  6 tablet
 Refills:  0
 Commonly known as:  DELTASONE
 Take 2 tabs x 3 days then stop
  
  
 CONTINUE taking these medications  
 Acetaminophen 500 MG coapsule
 Refills:  0
  
 albuterol 108 (90 Base) MCG/ACT inhaler
 Refills:  0
 Commonly known as:  PROVENTIL HFA;VENTOLIN HFA
  
 budesonide 0.5 MG/2ML nebulizer suspension
 Refills:  0
 Commonly known as:  PULMICORT
 
  
 buPROPion 300 MG 24 hr tablet
 Refills:  0
 Commonly known as:  WELLBUTRIN XL
  
 dextromethorphan polistirex 30 MG/5ML ER suspension
 Refills:  0
 Commonly known as:  DELSYM
  
 ergocalciferol 09284 units capsule
 Refills:  0
 Commonly known as:  DRISDOL
  
 furosemide 20 MG tablet
 Refills:  0
 Commonly known as:  LASIX
  
 guaifenesin 1200 MG 12hr tablet
 Refills:  0
 Commonly known as:  MUCINEX MAX
  
 L-methylfolate 15 MG tablet
 Refills:  0
 Commonly known as:  DEPLIN
  
 LATUDA 40 MG tablet
 Refills:  0
 Generic drug:  lurasidone
  
 METHYL B-12 PO
 Refills:  0
  
 OLANZapine 20 MG tablet
 Refills:  0
 Commonly known as:  ZyPREXA
  
 omeprazole 20 MG capsule
 Refills:  0
 Commonly known as:  PRILOSEC
  
 oxycodone 5 MG capsule
 QTY:  30 capsule
 Refills:  0
 Commonly known as:  OXY-IR
 Take 1 capsule by mouth every 6 (six) hours as needed.
  
 polyethylene glycol packet
 Refills:  0
 Commonly known as:  GLYCOLAX
  
 propranolol 20 MG tablet
 Refills:  0
 Commonly known as:  INDERAL
  
 sertraline 100 MG tablet
 Refills:  0
 Commonly known as:  ZOLOFT
  
 simvastatin 40 MG tablet
 Refills:  0
 
 Commonly known as:  ZOCOR
  
 topiramate 25 MG tablet
 Refills:  0
 Commonly known as:  TOPAMAX
  
 umeclidinium-vilanterol 62.5-25 MCG/INH inhaler
 QTY:  1 each
 Refills:  11
 For diagnoses:  Asthma with COPD (chronic obstructive pulmonary disease)
 Commonly known as:  ANORO ELLIPTA
 Inhale 1 puff into the lungs daily.
  
  
 You might also be taking other medications not listed above. If you have questions about an
y of your other medications, talk to the person who prescribed them or your Primary Care Pro
vider. 
  
  
  
  
 Where to Get Your Medications 
  
 These medications were sent to [facility] - [address] [address] 
  Phone:  [phone] 
  levofloxacin 750 MG tablet
  
 You can get these medications from any pharmacy  
 Bring a paper prescription for each of these medications
  ipratropium-albuterol 0.5-2.5 mg/3mL
  predniSONE 20 MG tablet
  
 
 Discharge took >30 minutes, to include final examination, discussion of admission, and prep
aration of prescriptions, instructions for on-going care, follow-up and documentation of dis
charge summary.
 
 Onur Gates MD
 2018in this encounter
 
 Discharge Instructions
 Onur Gates MD - 2018Return home with previous orders
 Resume home meds as before
 Prednisone taper and complete the course of levaquinin this encounter
 
 Medications at Time of Discharge
 
 
+----------------------+----------------------+----------+---------+----------+----------+
| Medication           | Sig.                 | Disp.    | Refills | Start    | End Date |
|                      |                      |          |         | Date     |          |
+----------------------+----------------------+----------+---------+----------+----------+
|   Acetaminophen 500  | Take 1,000 mg by     |          |         |          |          |
| MG coapsule          | mouth 4 (four) times |          |         |          |          |
|                      |  daily.              |          |         |          |          |
+----------------------+----------------------+----------+---------+----------+----------+
|   albuterol          | Inhale 2 puffs into  |          |         |          |          |
| (PROVENTIL           | the lungs every 4    |          |         |          |          |
| HFA;VENTOLIN HFA)    | (four) hours as      |          |         |          |          |
 
| 108 (90 Base)        | needed for Wheezing. |          |         |          |          |
| MCG/ACT inhaler      |                      |          |         |          |          |
+----------------------+----------------------+----------+---------+----------+----------+
|   budesonide         | Take 0.5 mg by       |          |         |          |          |
| (PULMICORT) 0.5      | nebulization 2 (two) |          |         |          |          |
| MG/2ML nebulizer     |  times daily.        |          |         |          |          |
| suspension           |                      |          |         |          |          |
+----------------------+----------------------+----------+---------+----------+----------+
|   buPROPion          | Take 300 mg by mouth |          |         |          |          |
| (WELLBUTRIN XL) 300  |  every morning.      |          |         |          |          |
| MG 24 hr tablet      |                      |          |         |          |          |
+----------------------+----------------------+----------+---------+----------+----------+
|   dextromethorphan   | Take 60 mg by mouth  |          |         |          |          |
| polistirex (DELSYM)  | every 12 (twelve)    |          |         |          |          |
| 30 MG/5ML ER         | hours as needed for  |          |         |          |          |
| suspension           | Cough.               |          |         |          |          |
+----------------------+----------------------+----------+---------+----------+----------+
|   ergocalciferol     | Take 50,000 Units by |          |         |          |          |
| (DRISDOL) 92877      |  mouth once a week.  |          |         |          |          |
| units capsule        |                      |          |         |          |          |
+----------------------+----------------------+----------+---------+----------+----------+
|   furosemide (LASIX) | Take 20 mg by mouth  |          |         |          |          |
|  20 MG tablet        | daily.               |          |         |          |          |
+----------------------+----------------------+----------+---------+----------+----------+
|   guaifenesin        | Take 1,200 mg by     |          |         |          |          |
| (MUCINEX MAX) 1200   | mouth every 6 (six)  |          |         |          |          |
| MG 12hr tablet       | hours as needed.     |          |         |          |          |
+----------------------+----------------------+----------+---------+----------+----------+
|                      | Every 4 hours x 5    |   360 mL | 0       | 07/30/20 |          |
| ipratropium-albutero | days then Q4H prn    |          |         | 18       |          |
| l (DUO-NEB) 0.5-2.5  | for SOB or wheezing  |          |         |          |          |
| mg/3mL               |                      |          |         |          |          |
+----------------------+----------------------+----------+---------+----------+----------+
|   L-methylfolate     | Take 15 mg by mouth  |          |         |          |          |
| (DEPLIN) 15 MG       | daily with           |          |         |          |          |
| tablet               | breakfast.           |          |         |          |          |
+----------------------+----------------------+----------+---------+----------+----------+
|   levofloxacin       | Take 1 tablet by     |   5      | 0       | 20 |          |
| (LEVAQUIN) 750 MG    | mouth daily.         | tablet   |         | 18       |          |
| tablet               |                      |          |         |          |          |
+----------------------+----------------------+----------+---------+----------+----------+
|   lurasidone         | Take 40 mg by mouth  |          |         |          |          |
| (LATUDA) 40 MG       | daily.               |          |         |          |          |
| tablet               |                      |          |         |          |          |
+----------------------+----------------------+----------+---------+----------+----------+
|   Methylcobalamin    | Take 5,000 mcg by    |          |         |          |          |
| (METHYL B-12 PO)     | mouth 2 (two) times  |          |         |          |          |
|                      | daily.               |          |         |          |          |
+----------------------+----------------------+----------+---------+----------+----------+
|   OLANZapine         | Take 10 mg by mouth  |          |         |          |          |
| (ZYPREXA) 20 MG      | 2 (two) times daily. |          |         |          |          |
| tablet               |                      |          |         |          |          |
+----------------------+----------------------+----------+---------+----------+----------+
|   omeprazole         | Take 20 mg by mouth  |          |         |          |          |
| (PRILOSEC) 20 MG     | 2 (two) times daily. |          |         |          |          |
| capsule              |                      |          |         |          |          |
+----------------------+----------------------+----------+---------+----------+----------+
|   oxycodone (OXY-IR) | Take 1 capsule by    |   30     | 0       | / |          |
|  5 MG capsule        | mouth every 6 (six)  | capsule  |         | 18       |          |
|                      | hours as needed.     |          |         |          |          |
 
+----------------------+----------------------+----------+---------+----------+----------+
|   polyethylene       | Take 17 g by mouth   |          |         |          |          |
| glycol (GLYCOLAX)    | daily.               |          |         |          |          |
| packet               |                      |          |         |          |          |
+----------------------+----------------------+----------+---------+----------+----------+
|   predniSONE         | Take 2 tabs x 3 days |   6      | 0       | / |          |
| (DELTASONE) 20 MG    |  then stop           | tablet   |         | 18       |          |
| tablet               |                      |          |         |          |          |
+----------------------+----------------------+----------+---------+----------+----------+
|   propranolol        | Take 20 mg by mouth  |          |         |          |          |
| (INDERAL) 20 MG      | 2 (two) times daily. |          |         |          |          |
| tablet               |                      |          |         |          |          |
+----------------------+----------------------+----------+---------+----------+----------+
|   sertraline         | Take  mg by    |          |         |          |          |
| (ZOLOFT) 100 MG      | mouth See Admin      |          |         |          |          |
| tablet               | Instructions. Take   |          |         |          |          |
|                      | 100 mg by mouth      |          |         |          |          |
|                      | every morning and 50 |          |         |          |          |
|                      |  mg by mouth in the  |          |         |          |          |
|                      | afternoon            |          |         |          |          |
+----------------------+----------------------+----------+---------+----------+----------+
|   simvastatin        | Take 40 mg by mouth  |          |         |          |          |
| (ZOCOR) 40 MG tablet | nightly.             |          |         |          |          |
+----------------------+----------------------+----------+---------+----------+----------+
|   topiramate         | Take 25 mg by mouth  |          |         |          |          |
| (TOPAMAX) 25 MG      | 2 (two) times daily. |          |         |          |          |
| tablet               |                      |          |         |          |          |
+----------------------+----------------------+----------+---------+----------+----------+
|                      | Inhale 1 puff into   |   1 each | 11      | // |          |
| umeclidinium-vilante | the lungs daily.     |          |         | 18       |          |
| rol (ANORO ELLIPTA)  |                      |          |         |          |          |
| 62.5-25 MCG/INH      |                      |          |         |          |          |
| inhalerIndications:  |                      |          |         |          |          |
| Asthma with COPD     |                      |          |         |          |          |
| (chronic obstructive |                      |          |         |          |          |
|  pulmonary disease)  |                      |          |         |          |          |
| (Formerly [facility])                |                      |          |         |          |          |
+----------------------+----------------------+----------+---------+----------+----------+
 as of this encounter
 
 Progress Notes
 Onur Gates MD - 2018  7:47 AM PDTFormatting of this note may be different from the or
iginal.
 [facility]
 Service:  Hospitalist
 Progress Note
 
 Hospital Day:   LOS: 2 days 
 Hospital Course:
 69 y/o CM with PMH significant for pHTN, COPD, EMELINA, schizoaffective disorder admitted for S
OB.  Pt is a resident of a group home in [place]. Pt uses nocturnal o2 but refuses CPAP.  
Pt sees Dr. Contreras.  Pt wishes to be DNR/DNI.
 Pt has been having SOB, COTA, orthopnea, edema in legs, productive cough, wheezing.  
 
 Post-Op Day:  * No surgery found *
 SUBJECTIVE
 
      Events Overnight:  Had run of 10 PVC early this morning, afebrile.  Has not gotten out
 of bed, okay to work with PT.  Says no or I don't know to many answers
 
 
 OBJECTIVE
 Vital Signs:
 /73 (BP Location: Left upper arm)  | Pulse 63  | Temp 97.6 F (36.4 C) (Oral)  | R
tara 18  | Ht 1.753 m (5' 9")  | Wt 98.8 kg (217 lb 13 oz)  | SpO2 96%  | BMI 32.17 kg/m 
 Temp:  [97.6 F (36.4 C)-98 F (36.7 C)] 97.6 F (36.4 C) (725)
 BP: (102-139)/(56-76) 112/73 (725)
 Heart Rate:  [60-93] 63 (725)
 Resp:  [16-22] 18 (725)
 SpO2:  [94 %-100 %] 96 % (725)
 
 DATA
 
 Recent Labs
 Lab 18
 0420 18
 0447 18
 1740 
 WBC 12.32* 10.93 12.67* 
 HGB 13.3 13.0* 13.5 
 HCT 39.2 37.9* 40.7 
  180 194 
 NEUTOPHILPCT 80.24  --  78.22 
 MONOPCT 7.82  --  8.09 
 
 Recent Labs
 Lab 18
 0420 18
 0447 18 
  143 140 
 K 3.5 3.7 3.7 
 * 108 107 
 CO2 25 28 26 
 BUN 17 15 14 
 CREATININE 0.8 0.9 0.86 
 PROT 6.8 6.7 7.3 
 BILITOT 0.2 0.4 0.4 
 ALT 26 23 27 
 AST 20 14 15 
 
 Phosphorus:  
 
 Recent Labs
 Lab 187 
 PHOS 2.1* 
 
 Invalid input(s): LABALBU
 
 Recent Labs
 Lab 18
 044 
 MG 2.3 
 
 No results for input(s): AMYLASE in the last 168 hours.
 No results for input(s): LIPASE in the last 168 hours.
 
 Recent Labs
 Lab 18
 1941 
 
 BEART 0 
 
 Recent Labs
 Lab 18
 1800 18
 1740 
 APTT 31 29 SPECIMEN HEMOLYZED, WILL BE REDRAWN RN TO SEND 
 INR 1.1 1.1 SPECIMEN HEMOLYZED, WILL BE REDRAWN RN TO SEND 
 
 No results for input(s): TSH, T3FREE, FREET4 in the last 168 hours.
 
 Recent Labs
 Lab 18
 1740 
 CKTOTAL 51* SPECIMEN HEMOLYZED, WILL BE REDRAWN RN TO SEND 
 TROPONINI <0.020  --  
 CKMBINDEX UNABLE TO CALCULATE SPECIMEN HEMOLYZED, WILL BE REDRAWN RN TO SEND 
 
 Results  
  Procedure Component Value Units Date/Time 
  Sputum culture [[id number]] Collected:  18 0800 
  Specimen:  Sputum from Sputum Updated:  18 1513 
   Specimen Description SPUTUM 
   GRAM STAIN GREATER THAN 10 SEC/LPF 
   GRAM STAIN LESS THAN 10 WBCS/LPF 
   GRAM STAIN 3+ 
   GRAM STAIN GRAM POSITIVE COCCI 
   CULTURE SMEAR CONTAINS GREATER THAN 10 SEC/LPF SUGGESTIVE OF POOR QUALITY SPECIMEN.  SPEC
IMEN WILL NOT BE CULTURED OR WILL BE CULTURED BY SPECIAL REQUEST ONLY.  PLEASE RECOLLECT IF 
CLINICALLY INDICATED.  SPECIMEN WILL BE HELD 48 HOURS. 
  Respiratory Filmarray [[id number]] Collected:  18 2234 
  Specimen:  Nasopharyngeal Culture Updated:  18 0337 
   ADENOVIRUS Not Detected 
   CORONAVIRUS 229E Not Detected 
   CORONAVIRUS HKU1 Not Detected 
   CORONAVIRUS NL63 Not Detected 
   CORONAVIRUS OC43 Not Detected 
   HUMAN METAPNEUMOVIRUS Not Detected 
   HUMAN RHINO/ENTERO Not Detected 
   INFLUENZA A Not Detected 
   INFLUENZA B Not Detected 
   PARAINFLUENZA 1 Not Detected 
   PARAINFLUENZA 2 Not Detected 
   PARAINFLUENZA 3 Not Detected 
   PARAINFLUENZA 4 Not Detected 
   RESP SYNCYTIAL VIRUS Not Detected 
   BORDETELLA PERTUSSIS Not Detected 
   CHLAMYDIAE PNEUMONIAE Not Detected 
   MYCOPLASMA PNEUMONIAE Not Detected 
   RESP PANEL INTERP Testing performed by Molecular Methodology 
  
 
 Physical Exam 
 Constitutional: He is oriented to person, place, and time. He appears well-developed and we
ll-nourished. No distress. 
 Face mask
  
 HENT: 
 
 Head: Normocephalic and atraumatic. 
 Eyes: Pupils are equal, round, and reactive to light. EOM are normal. 
 Cardiovascular: Normal rate, regular rhythm and normal heart sounds.  
 Pulmonary/Chest: Effort normal and breath sounds normal. No respiratory distress. He has no
 wheezes. 
 Abdomina/Gl: Soft. Bowel sounds are normal. He exhibits no distension. There is no tenderne
ss. 
 Musculoskeletal: Normal range of motion. He exhibits edema (improved). 
 Neurological: He is alert and oriented to person, place, and time. 
 Skin: Skin is warm and dry. 
 
 Scheduled Medications 
   atorvastatin  20 mg Oral Nightly 
   budesonide  0.5 mg Nebulization 2 times daily 
   buPROPion  300 mg Oral QAM 
   enoxaparin  40 mg Subcutaneous Q24H 
   famotidine  20 mg Oral BID 
  Or 
   famotidine  20 mg Intravenous BID 
   furosemide  20 mg Oral Daily 
   ipratropium-albuterol  3 mL Nebulization Q4H WA 
   levofloxacin  500 mg Intravenous Q24H 
   lurasidone  40 mg Oral Daily 
   OLANZapine  10 mg Oral BID 
   pantoprazole  40 mg Oral QAM AC 
   predniSONE  50 mg Oral Daily with breakfast 
   propranolol  20 mg Oral BID 
   sertraline  100 mg Oral Daily 
   sertraline  50 mg Oral See Admin Instructions 
   sodium chloride (PF)  10 mL Intravenous Q8H 
   topiramate  25 mg Oral BID 
   umeclidinium-vilanterol  1 puff Inhalation Daily 
 
 Continuous Infusions 
 
 PRN Medications
 acetaminophen **OR** acetaminophen, polyethylene glycol, zolpidem
 
 Xr Chest Pa And Lateral
 
 Result Date: 2018
 Bilateral pulmonary vascular congestion. Bibasilar atelectasis. Electronically signed by Colt Blandon MD on 2018 7:10 PM
 
 Cta Chest Pulmonary Embolism W Iv Con
 
 Result Date: 2018
 1. Some images are degraded due to motion artifact. No obvious pulmonary emboli. 2. Emphyse
ma with pulmonary vascular congestion and bilateral infiltrate or atelectasis, right greater
 than left. 3. Dilated ascending aorta measuring 4.4 cm. No aortic dissection. 4. Marked tra
cheomalacia. RADIA  Electronically signed by Alex Williamson MD on 2018 12:56AM Referrin
krishan Provider Line: [phone] ID: [id number]
 
 Us Lower Extremity Venous Doppler Bilateral
 
 Result Date: 2018
 1.  No evidence of lower extremity deep vein thrombosis. Electronically signed by Mega barros MD on 2018 9:48 PM
 
 PROBLEM LIST
 
 
 Principal Problem:
   Hypoxemia
 Active Problems:
   BMI 31.0-31.9,adult
   Schizoaffective disorder (HCC)
   Leg swelling
   COPD (chronic obstructive pulmonary disease) (Formerly [facility])
 
 ASSESSMENT & PLAN
 
 1. Acute on chronic hypoxic respiratory failure: thought to be due to pna.  Pt uses nocturn
al oxygen.  CTA chest no obvious PE, emphysema with pulm vascular congestion and bilateral i
nfiltrate/atelectasis R>L and tracheomalacia.  LE US doppler negative.  CXR showed bilateral
 pulmonary vascular congestion, and atelectasis.  BNP normal.  RVP negative.  procalcitonin 
0.93.  Sputum culture not good specimen.   Bcx negative to date.  Recent 2D echo from 2018
 showed EF 60-65%.  Gentle diuresis, IV levaquin, prednisone and scheduled duoneb.
 
 2. COPD: prednisone, levaquin, scheduled duoneb.  H/o smoking 2-3 packs/day for 30 years, s
topped 20 years ago. 
 
 3. Schizoaffective disorder: continue home meds, stable.  Pt lives at group home
 
 4. Obesity BMI 32
 
 5. EMELINA pt refuses CPAP.  Continue nocturnal oxygen 2L.  Wean off oxygen during the day.
 
 6. DVT prophylaxis: lovenox.
 
 7. CAP: leukocytosis, no fever but elevated procalcitonin and infiltrates seen on CTA chest
 R > L. On levaquin.  No blood cultures done.  Recheck procalcitonin to see if trending down
.  Recurrent leukocytosis likely due to prednisone.
 
 8. 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 No evidence of cris
gnancy.
 
 Disposition:  inpatient
 Code Status:  DNR/DNI
 
 Onur Gates MD
 2018
 Onur Gates MD - 2018  7:52 AM PDTFormatting of this note may be different from the or
iginal.
 [facility]
 Service:  Hospitalist
 Progress Note
 
 Hospital Day:   LOS: 1 day 
 Hospital Course:
 69 y/o CM with PMH significant for pHTN, COPD, EMELINA, schizoaffective disorder admitted for S
OB.  Pt is a resident of a group home in [place]. Pt uses nocturnal o2 but refuses CPAP.  
Pt sees Dr. Contreras.  Pt wishes to be DNR/DNI.
 Pt has been having SOB, COTA, orthopnea, edema in legs, productive cough, wheezing.  
 
 Post-Op Day:  * No surgery found *
 SUBJECTIVE
 
      Events Overnight:  Feeling better, no complaints, says I don't know to many questions 
though
 
 
 OBJECTIVE
 Vital Signs:
 /61 (BP Location: Right upper arm)  | Pulse 88  | Temp 97.8 F (36.6 C) (Oral)  | 
Resp 22  | Ht 1.753 m (5' 9")  | Wt 98.8 kg (217 lb 13 oz)  | SpO2 95%  | BMI 32.17 kg/m 
 Temp:  [97.3 F (36.3 C)-98.3 F (36.8 C)] 97.8 F (36.6 C) (343)
 BP: ()/(55-77) 114/61 (343)
 Heart Rate:  [73-94] 88 (343)
 Resp:  [20-25] 22 (343)
 SpO2:  [85 %-96 %] 95 % (343)
 Height:  [175.3 cm (5' 9")] 175.3 cm (5' 9") (2000)
 Weight:  [95.3 kg (210 lb)-98.8 kg (217 lb 13 oz)] 98.8 kg (217 lb 13 oz) (343)
 BMI (Calculated):  [32.2] 32.2 (2000)
 
 DATA
 
 Recent Labs
 Lab 18
 0447 18
 1740 
 WBC 10.93 12.67* 
 HGB 13.0* 13.5 
 HCT 37.9* 40.7 
  194 
 NEUTOPHILPCT  --  78.22 
 MONOPCT  --  8.09 
 
 Recent Labs
 Lab 18
 0447 18
 1740 
  140 SPECIMEN HEMOLYZED, WILL BE REDRAWN RN TO SEND 
 K 3.7 3.7 SPECIMEN HEMOLYZED, WILL BE REDRAWN RN TO SEND 
  107 SPECIMEN HEMOLYZED, WILL BE REDRAWN RN TO SEND 
 CO2 28 26 SPECIMEN HEMOLYZED, WILL BE REDRAWN RN TO SEND 
 BUN 15 14 SPECIMEN HEMOLYZED, WILL BE REDRAWN RN TO SEND 
 CREATININE 0.9 0.86 SPECIMEN HEMOLYZED, WILL BE REDRAWN RN TO SEND 
 PROT 6.7 7.3 SPECIMEN HEMOLYZED, WILL BE REDRAWN RN TO SEND 
 BILITOT 0.4 0.4 SPECIMEN HEMOLYZED, WILL BE REDRAWN RN TO SEND 
 ALT 23 27 SPECIMEN HEMOLYZED, WILL BE REDRAWN RN TO SEND 
 AST 14 15 SPECIMEN HEMOLYZED, WILL BE REDRAWN RN TO SEND 
 
 Phosphorus:  
 Recent Labs
 Lab 18 
 PHOS 2.1* 
 
 Invalid input(s): LABALBU
 
 Recent Labs
 Lab 18 
 MG 2.3 
 
 No results for input(s): AMYLASE in the last 168 hours.
 No results for input(s): LIPASE in the last 168 hours.
 
 Recent Labs
 Lab 18
 
 1941 
 BEART 0 
 
 Recent Labs
 Lab 18
 1800 18
 1740 
 APTT 31 29 SPECIMEN HEMOLYZED, WILL BE REDRAWN RN TO SEND 
 INR 1.1 1.1 SPECIMEN HEMOLYZED, WILL BE REDRAWN RN TO SEND 
 
 No results for input(s): TSH, T3FREE, FREET4 in the last 168 hours.
 
 Recent Labs
 Lab 18
 1740 
 CKTOTAL 51* SPECIMEN HEMOLYZED, WILL BE REDRAWN RN TO SEND 
 TROPONINI <0.020  --  
 CKMBINDEX UNABLE TO CALCULATE SPECIMEN HEMOLYZED, WILL BE REDRAWN RN TO SEND 
 
 Results  
  Procedure Component Value Units Date/Time 
  Sputum culture [[id number]] Collected:  18 0800 
  Specimen:  Sputum from Sputum Updated:  18 0808 
  Respiratory Filmarray [[id number]] Collected:  18 2234 
  Specimen:  Nasopharyngeal Culture Updated:  18 0337 
   ADENOVIRUS Not Detected 
   CORONAVIRUS 229E Not Detected 
   CORONAVIRUS HKU1 Not Detected 
   CORONAVIRUS NL63 Not Detected 
   CORONAVIRUS OC43 Not Detected 
   HUMAN METAPNEUMOVIRUS Not Detected 
   HUMAN RHINO/ENTERO Not Detected 
   INFLUENZA A Not Detected 
   INFLUENZA B Not Detected 
   PARAINFLUENZA 1 Not Detected 
   PARAINFLUENZA 2 Not Detected 
   PARAINFLUENZA 3 Not Detected 
   PARAINFLUENZA 4 Not Detected 
   RESP SYNCYTIAL VIRUS Not Detected 
   BORDETELLA PERTUSSIS Not Detected 
   CHLAMYDIAE PNEUMONIAE Not Detected 
   MYCOPLASMA PNEUMONIAE Not Detected 
   RESP PANEL INTERP Testing performed by Molecular Methodology 
  
 
 Physical Exam 
 Constitutional: He is oriented to person, place, and time. He appears well-developed and we
ll-nourished. No distress. 
 Face mask
  
 HENT: 
 Head: Normocephalic and atraumatic. 
 Eyes: Pupils are equal, round, and reactive to light. EOM are normal. 
 Cardiovascular: Normal rate, regular rhythm and normal heart sounds.  
 Pulmonary/Chest: Effort normal and breath sounds normal. No respiratory distress. He has no
 wheezes. 
 Abdomina/Gl: Soft. Bowel sounds are normal. He exhibits no distension. There is no tenderne
ss. 
 
 Musculoskeletal: Normal range of motion. He exhibits edema (legs). 
 Neurological: He is alert and oriented to person, place, and time. 
 Skin: Skin is warm and dry. 
 
 Scheduled Medications   atorvastatin  20 mg Oral Nightly 
   budesonide  0.5 mg Nebulization 2 times daily 
   buPROPion  300 mg Oral QAM 
   enoxaparin  40 mg Subcutaneous Q24H 
   famotidine  20 mg Oral BID 
  Or 
   famotidine  20 mg Intravenous BID 
   furosemide  20 mg Oral Daily 
   ipratropium-albuterol  3 mL Nebulization Q4H WA 
   levofloxacin  500 mg Intravenous Q24H 
   lurasidone  40 mg Oral Daily 
   OLANZapine  10 mg Oral BID 
   pantoprazole  40 mg Oral QAM AC 
   predniSONE  50 mg Oral Daily with breakfast 
   propranolol  20 mg Oral BID 
   sertraline  100 mg Oral Daily 
   sertraline  50 mg Oral See Admin Instructions 
   sodium chloride (PF)  10 mL Intravenous Q8H 
   topiramate  25 mg Oral BID 
   umeclidinium-vilanterol  1 puff Inhalation Daily 
 
 Continuous Infusions 
 
 PRN Medications
 acetaminophen **OR** acetaminophen, polyethylene glycol, zolpidem
 
 Xr Chest Pa And Lateral
 
 Result Date: 2018
 Bilateral pulmonary vascular congestion. Bibasilar atelectasis. Electronically signed by Colt Blandon MD on 2018 7:10 PM
 
 Cta Chest Pulmonary Embolism W Iv Con
 
 Result Date: 2018
 1. Some images are degraded due to motion artifact. No obvious pulmonary emboli. 2. Emphyse
ma with pulmonary vascular congestion and bilateral infiltrate or atelectasis, right greater
 than left. 3. Dilated ascending aorta measuring 4.4 cm. No aortic dissection. 4. Marked tra
cheomalacia. RADIA  Electronically signed by Alex Williamson MD on 2018 12:56AM Martrin
krishan Provider Line: [phone] ID: [id number]
 
 Us Lower Extremity Venous Doppler Bilateral
 
 Result Date: 2018
 1.  No evidence of lower extremity deep vein thrombosis. Electronically signed by Mega barros MD on 2018 9:48 PM
 
 PROBLEM LIST
 
 Principal Problem:
   Hypoxemia
 Active Problems:
   BMI 31.0-31.9,adult
   Schizoaffective disorder (HCC)
   Leg swelling
   COPD (chronic obstructive pulmonary disease) (Formerly [facility])
 
 
 ASSESSMENT & PLAN
 
 1. Acute on chronic hypoxic respiratory failure: pt uses nocturnal oxygen.  CTA chest no ob
vious PE, emphysema with pulm vascular congestion and bilateral infiltrate/atelectasis R>L a
nd tracheomalacia.  LE US doppler negative.  CXR showed bilateral pulmonary vascular congest
ion, and atelectasis.  BNP normal.  RVP negative.  procalcitonin 0.93.  Sputum culture not g
ood specimen. 
 
 2. COPD: prednisone, levaquin, scheduled duoneb.  H/o smoking 2-3 packs/day for 30 years, s
topped 20 years ago.  
 
 3. Schizoaffective disorder: continue home meds, stable.  Pt lives at group home
 
 4. Obesity BMI 32
 
 5. EMELINA pt refuses CPAP.  Continue nocturnal oxygen 2L.
 
 6. DVT prophylaxis: lovenox.
 
 7. CAP: leukocytosis resolved, no fever but elevated procalcitonin and infiltrates seen on 
CTA chest R > L. On levaquin.  No blood cultures done.  Recheck procalcitonin to see if cheikh
ding down.
 
 8. 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 No evidence of cris
gnancy.
 
 Disposition:  inpatient
 Code Status:  Full Code
 
 Onur Gates MD
 2018
 Altaf Temple, [facility] - 2018  9:03 PM PDTNote ccl 92.3ml/min   meds reviewed   pharma
cy will follow  [facility]  2103in this encounter
 
 Plan of Treatment
 
 
+--------+---------+-------------+----------------------+-------------+
| Date   | Type    | Specialty   | Care Team            | Description |
+--------+---------+-------------+----------------------+-------------+
| 10/19/ | Office  | Pulmonology |   Ben,          |             |
| 2018   | Visit   |             | Edwina Mckeon,   |             |
|        |         |             | MD [address] |             |
|        |         |             |   [place] [address] |             |
|        |         |             |   [phone]       |             |
|        |         |             | [phone] (Fax)   |             |
+--------+---------+-------------+----------------------+-------------+
 as of this encounter
 
 Procedures
 
 
+----------------------+--------+-------------+----------------------+----------------------
+
| Procedure Name       | Priori | Date/Time   | Associated Diagnosis | Comments             
|
|                      | ty     |             |                      |                      
|
+----------------------+--------+-------------+----------------------+----------------------
 
+
| PROCALCITONIN        | Routin | 2018  |                      |   Results for this   
|
|                      | e - AM |  4:25 AM    |                      | procedure are in the 
|
|                      |        | PDT         |                      |  results section.    
|
+----------------------+--------+-------------+----------------------+----------------------
+
| CBC W/AUTO DIFF      | Routin | 2018  |                      |   Results for this   
|
| (REFLEX TO MANUAL)   | e      |  4:25 AM    |                      | procedure are in the 
|
|                      |        | PDT         |                      |  results section.    
|
+----------------------+--------+-------------+----------------------+----------------------
+
| BASIC METABOLIC      | Routin | 2018  |                      |   Results for this   
|
| PANEL                | e - AM |  4:25 AM    |                      | procedure are in the 
|
|                      |        | PDT         |                      |  results section.    
|
+----------------------+--------+-------------+----------------------+----------------------
+
| CBC W/AUTO DIFF      | Routin | 2018  |                      |   Results for this   
|
| (REFLEX TO MANUAL)   | e      |  4:20 AM    |                      | procedure are in the 
|
|                      |        | PDT         |                      |  results section.    
|
+----------------------+--------+-------------+----------------------+----------------------
+
| COMPREHENSIVE        | Routin | 2018  |                      |   Results for this   
|
| METABOLIC PANEL      | e      |  4:20 AM    |                      | procedure are in the 
|
|                      |        | PDT         |                      |  results section.    
|
+----------------------+--------+-------------+----------------------+----------------------
+
| SPUTUM CULT W/ GRAM  | Timed  | 2018  |                      |   Results for this   
|
| STAIN                |        |  8:00 AM    |                      | procedure are in the 
|
|                      |        | PDT         |                      |  results section.    
|
+----------------------+--------+-------------+----------------------+----------------------
+
| CBC W/AUTO DIFF      | Routin | 2018  |                      |   Results for this   
|
| (REFLEX TO MANUAL)   | e      |  4:47 AM    |                      | procedure are in the 
|
|                      |        | PDT         |                      |  results section.    
|
+----------------------+--------+-------------+----------------------+----------------------
+
| PHOSPHOROUS          | Routin | 2018  |                      |   Results for this   
|
|                      | e - AM |  4:47 AM    |                      | procedure are in the 
 
|
|                      |        | PDT         |                      |  results section.    
|
+----------------------+--------+-------------+----------------------+----------------------
+
| MAGNESIUM            | Routin | 2018  |                      |   Results for this   
|
|                      | e - AM |  4:47 AM    |                      | procedure are in the 
|
|                      |        | PDT         |                      |  results section.    
|
+----------------------+--------+-------------+----------------------+----------------------
+
| COMPREHENSIVE        | Routin | 2018  |                      |   Results for this   
|
| METABOLIC PANEL      | e      |  4:47 AM    |                      | procedure are in the 
|
|                      |        | PDT         |                      |  results section.    
|
+----------------------+--------+-------------+----------------------+----------------------
+
| CTA CHEST PULMONARY  | STAT   | 2018  |                      |   Results for this   
|
| EMBOLISM W CONTRAST  |        | 11:44 PM    |                      | procedure are in the 
|
|                      |        | PDT         |                      |  results section.    
|
+----------------------+--------+-------------+----------------------+----------------------
+
| POC ARTERIAL BLOOD   | Routin | 2018  |                      |   Results for this   
|
| GAS                  | e      | 11:11 PM    |                      | procedure are in the 
|
|                      |        | PDT         |                      |  results section.    
|
+----------------------+--------+-------------+----------------------+----------------------
+
| RESPIRATORY          | Timed  | 2018  |                      |   Results for this   
|
| FILMARRAY            |        | 10:34 PM    |                      | procedure are in the 
|
|                      |        | PDT         |                      |  results section.    
|
+----------------------+--------+-------------+----------------------+----------------------
+
| US LOWER EXTREMITY   | STAT   | 2018  |                      |   Results for this   
|
| VENOUS DOPPLER BILAT |        |  9:50 PM    |                      | procedure are in the 
|
|                      |        | PDT         |                      |  results section.    
|
+----------------------+--------+-------------+----------------------+----------------------
+
| [facility] SEPTIC LACTIC   | STAT   | 2018  |                      |   Results for this   
|
| ACID                 |        |  8:09 PM    |                      | procedure are in the 
|
|                      |        | PDT         |                      |  results section.    
|
+----------------------+--------+-------------+----------------------+----------------------
 
+
| PROCALCITONIN        | Timed  | 2018  |                      |   Results for this   
|
|                      |        |  8:09 PM    |                      | procedure are in the 
|
|                      |        | PDT         |                      |  results section.    
|
+----------------------+--------+-------------+----------------------+----------------------
+
| CK MB                | STAT   | 2018  |                      |   Results for this   
|
|                      |        |  8:09 PM    |                      | procedure are in the 
|
|                      |        | PDT         |                      |  results section.    
|
+----------------------+--------+-------------+----------------------+----------------------
+
| TROPONIN I           | STAT   | 2018  |                      |   Results for this   
|
|                      |        |  8:09 PM    |                      | procedure are in the 
|
|                      |        | PDT         |                      |  results section.    
|
+----------------------+--------+-------------+----------------------+----------------------
+
| APTT                 | STAT   | 2018  |                      |   Results for this   
|
|                      |        |  8:09 PM    |                      | procedure are in the 
|
|                      |        | PDT         |                      |  results section.    
|
+----------------------+--------+-------------+----------------------+----------------------
+
| PROTIME-INR          | STAT   | 2018  |                      |   Results for this   
|
|                      |        |  8:09 PM    |                      | procedure are in the 
|
|                      |        | PDT         |                      |  results section.    
|
+----------------------+--------+-------------+----------------------+----------------------
+
| D-DIMER,             | STAT   | 2018  |                      |   Results for this   
|
| QUANTITATIVE         |        |  8:09 PM    |                      | procedure are in the 
|
|                      |        | PDT         |                      |  results section.    
|
+----------------------+--------+-------------+----------------------+----------------------
+
| CK                   | STAT   | 2018  |                      |   Results for this   
|
|                      |        |  8:09 PM    |                      | procedure are in the 
|
|                      |        | PDT         |                      |  results section.    
|
+----------------------+--------+-------------+----------------------+----------------------
+
| COMPREHENSIVE        | STAT   | 2018  |                      |   Results for this   
|
| METABOLIC PANEL      |        |  8:09 PM    |                      | procedure are in the 
 
|
|                      |        | PDT         |                      |  results section.    
|
+----------------------+--------+-------------+----------------------+----------------------
+
| POC ARTERIAL BLOOD   | Routin | 2018  |                      |   Results for this   
|
| GAS                  | e      |  7:41 PM    |                      | procedure are in the 
|
|                      |        | PDT         |                      |  results section.    
|
+----------------------+--------+-------------+----------------------+----------------------
+
| ABG DRAW             | STAT   | 2018  |                      |                      
|
|                      |        |  7:19 PM    |                      |                      
|
|                      |        | PDT         |                      |                      
|
+----------------------+--------+-------------+----------------------+----------------------
+
| XR CHEST 2 VIEW      | ASAP   | 2018  |                      |   Results for this   
|
| FRONTAL AND LATERAL  |        |  7:04 PM    |                      | procedure are in the 
|
|                      |        | PDT         |                      |  results section.    
|
+----------------------+--------+-------------+----------------------+----------------------
+
| APTT                 | STAT   | 2018  |                      |   Results for this   
|
|                      |        |  6:00 PM    |                      | procedure are in the 
|
|                      |        | PDT         |                      |  results section.    
|
+----------------------+--------+-------------+----------------------+----------------------
+
| PROTIME-INR          | STAT   | 2018  |                      |   Results for this   
|
|                      |        |  6:00 PM    |                      | procedure are in the 
|
|                      |        | PDT         |                      |  results section.    
|
+----------------------+--------+-------------+----------------------+----------------------
+
| [facility] CARD PANEL W/O   | STAT   | 2018  |                      |   Results for this   
|
| TRP (ED ONLY)        |        |  5:40 PM    |                      | procedure are in the 
|
|                      |        | PDT         |                      |  results section.    
|
+----------------------+--------+-------------+----------------------+----------------------
+
| BRAIN NATRIURETIC    | STAT   | 2018  |                      |   Results for this   
|
| PEPTIDE              |        |  5:40 PM    |                      | procedure are in the 
|
|                      |        | PDT         |                      |  results section.    
|
+----------------------+--------+-------------+----------------------+----------------------
 
+
| EKG 12 LEAD UNIT     | STAT   | 2018  |                      |   Results for this   
|
| PERFORMED            |        |  5:31 PM    |                      | procedure are in the 
|
|                      |        | PDT         |                      |  results section.    
|
+----------------------+--------+-------------+----------------------+----------------------
+
| NEBULIZER/INHALATION | STAT   | 2018  |                      |                      
|
|  TREATMENT           |        |  5:22 PM    |                      |                      
|
|                      |        | PDT         |                      |                      
|
+----------------------+--------+-------------+----------------------+----------------------
+
| ED INFORMATION       | Routin | 2018  |                      |   Results for this   
|
| EXCHANGE             | e      |  3:51 PM    |                      | procedure are in the 
|
|                      |        | PDT         |                      |  results section.    
|
+----------------------+--------+-------------+----------------------+----------------------
+
 in this encounter
 
 Results
 PROCALCITONIN (2018  4:25 AM)
 
+---------------+--------------------------+------------+-----------------+
| Component     | Value                    | Ref Range  | Performed At    |
+---------------+--------------------------+------------+-----------------+
| PROCALCITONIN | 0.15Comment:             | <0.5 ng/mL | [facility] |
|               | INTERPRETIVE             |            |                 |
|               | INFORMATION:    PROCALCI |            |                 |
|               | TONIN PCT <= 0.5         |            |                 |
|               | ng/mL:        Low risk   |            |                 |
|               | for progression to       |            |                 |
|               | severe                   |            |                 |
|               | systemic        bacteria |            |                 |
|               | l infection (severe      |            |                 |
|               | sepsis/septic            |            |                 |
|               | shock).        Does not  |            |                 |
|               | exclude an infection,    |            |                 |
|               | because                  |            |                 |
|               | localized        infecti |            |                 |
|               | ons may be associated    |            |                 |
|               | with such low            |            |                 |
|               | levels.        If PCT is |            |                 |
|               |  measured very early     |            |                 |
|               | after                    |            |                 |
|               | bacterial        challen |            |                 |
|               | ge (usually <6 hours),   |            |                 |
|               | results may still        |            |                 |
|               | be        low and should |            |                 |
|               |  re-assess PCT 6-24      |            |                 |
|               | hours later. PCT >0.5    |            |                 |
|               | and <= 2                 |            |                 |
|               | ng/mL:        Moderate   |            |                 |
 
|               | risk for progression to  |            |                 |
|               | severe                   |            |                 |
|               | systemic        infectio |            |                 |
|               | n (severe sepsis/septic  |            |                 |
|               | shock).    Other         |            |                 |
|               | conditions are known to  |            |                 |
|               | elevate PCT, patient     |            |                 |
|               |         should be        |            |                 |
|               | closely monitored both   |            |                 |
|               | clinically and           |            |                 |
|               | by        re-assessing   |            |                 |
|               | PCT within 6-24 hours.   |            |                 |
|               | PCT > 2                  |            |                 |
|               | ng/mL:        High       |            |                 |
|               | likelihood for           |            |                 |
|               | progression to severe    |            |                 |
|               | systemic        bacteria |            |                 |
|               | l infection (severe      |            |                 |
|               | sepsis/septic shock).    |            |                 |
|               | PCT >= 10                |            |                 |
|               | ng/mL:        High       |            |                 |
|               | likelihood of severe     |            |                 |
|               | sepsis or septic         |            |                 |
|               | shock.Testing performed  |            |                 |
|               | at [facility] [place];[address] [place] [address]         |            |                 |
|               | [facility];[place] [address]   |            |                 |
+---------------+--------------------------+------------+-----------------+
 
 
 
+-------------------+------------------+--------------------+--------------+
| Performing        | Address          | City/State/Zipcode | Phone Number |
| Organization      |                  |                    |              |
+-------------------+------------------+--------------------+--------------+
|   [facility] |   [address] | [place] [address] |              |
+-------------------+------------------+--------------------+--------------+
 Basic metabolic panel (2018  4:25 AM)
 
+----------------+--------------------------+-------------------+--------------+
| Component      | Value                    | Ref Range         | Performed At |
+----------------+--------------------------+-------------------+--------------+
| SODIUM         | 144                      | 135 - 145 mmol/L  | [facility]   |
|                |                          |                   | LABORATORY   |
+----------------+--------------------------+-------------------+--------------+
| POTASSIUM      | 3.4 (L)                  | 3.5 - 4.9 mmol/L  | [facility]   |
|                |                          |                   | LABORATORY   |
+----------------+--------------------------+-------------------+--------------+
| CHLORIDE       | 107                      | 99 - 109 mmol/L   | [facility]   |
|                |                          |                   | LABORATORY   |
+----------------+--------------------------+-------------------+--------------+
| CO2            | 26                       | 23 - 32 mmol/L    | [facility]   |
|                |                          |                   | LABORATORY   |
+----------------+--------------------------+-------------------+--------------+
| ANION GAP AGAP | 14                       | 5 - 20 mmol/L     | [facility]   |
|                |                          |                   | LABORATORY   |
+----------------+--------------------------+-------------------+--------------+
| GLUCOSE        | 106 (H)                  | 65 - 99 mg/dL     | [facility]   |
|                |                          |                   | LABORATORY   |
+----------------+--------------------------+-------------------+--------------+
| BUN            | 17                       | 8 - 25 mg/dL      | [facility]   |
 
|                |                          |                   | LABORATORY   |
+----------------+--------------------------+-------------------+--------------+
| CREATININE     | 0.8                      | 0.70 - 1.30 mg/dL | [facility]   |
|                |                          |                   | LABORATORY   |
+----------------+--------------------------+-------------------+--------------+
| BUN/CREAT      | 21                       |                   | [facility]   |
|                |                          |                   | LABORATORY   |
+----------------+--------------------------+-------------------+--------------+
| CALCIUM        | 8.8                      | 8.5 - 10.5 mg/dL  | [facility]   |
|                |                          |                   | LABORATORY   |
+----------------+--------------------------+-------------------+--------------+
| EGFR           | >60Comment: GFR <60:     | >60 mL/min/1.73m2 | [facility]   |
|                | CHRONIC KIDNEY DISEASE,  |                   | LABORATORY   |
|                | IF FOUND OVER A 3 MONTH  |                   |              |
|                | PERIOD.GFR <15: KIDNEY   |                   |              |
|                | FAILURE.FOR       |                   |              |
|                | [employer], MULTIPLY THE  |                   |              |
|                | CALCULATED GFR BY        |                   |              |
|                | 1.210.This eGFR is       |                   |              |
|                | calculated using the     |                   |              |
|                | MDRD IDMS traceable      |                   |              |
|                | equation.Testing         |                   |              |
|                | performed at [facility], [address] |                   |              |
|                |  [facility],        |                   |              |
|                | [place]    [address]   |                   |              |
+----------------+--------------------------+-------------------+--------------+
 
 
 
+----------+
| Specimen |
+----------+
| Blood    |
+----------+
 
 
 
+---------------+-------------------------+---------------------+----------------+
| Performing    | Address                 | City/State/Zipcode  | Phone Number   |
| Organization  |                         |                     |                |
+---------------+-------------------------+---------------------+----------------+
|   [facility]  |   [address] [place] [address]  | [place] [address] |   [phone] |
| LABORATORY    | You.                   |                     |                |
+---------------+-------------------------+---------------------+----------------+
 CBC W/Auto Diff (Reflex to Manual) (2018  4:25 AM)
 
+-----------------+--------------------------+-------------------+--------------+
| Component       | Value                    | Ref Range         | Performed At |
+-----------------+--------------------------+-------------------+--------------+
| WBC             | 11.28 (H)                | 3.80 - 11.00 K/uL | [facility]   |
|                 |                          |                   | LABORATORY   |
+-----------------+--------------------------+-------------------+--------------+
| RBC             | 4.04 (L)                 | 4.20 - 5.70 M/uL  | [facility]   |
|                 |                          |                   | LABORATORY   |
+-----------------+--------------------------+-------------------+--------------+
| HGB             | 13.1 (L)                 | 13.2 - 17.0 g/dL  | [facility]   |
|                 |                          |                   | LABORATORY   |
+-----------------+--------------------------+-------------------+--------------+
| HCT             | 38.8 (L)                 | 39.0 - 50.0 %     | [facility]   |
|                 |                          |                   | LABORATORY   |
 
+-----------------+--------------------------+-------------------+--------------+
| MCV             | 96.1                     | 80.0 - 100.0 fl   | [facility]   |
|                 |                          |                   | LABORATORY   |
+-----------------+--------------------------+-------------------+--------------+
| MCH             | 32.3                     | 27.0 - 34.0 pg    | [facility]   |
|                 |                          |                   | LABORATORY   |
+-----------------+--------------------------+-------------------+--------------+
| MCHC            | 33.6                     | 32.0 - 35.5 g/dL  | [facility]   |
|                 |                          |                   | LABORATORY   |
+-----------------+--------------------------+-------------------+--------------+
| RDW SD          | 48.6                     | 37 - 53 fl        | [facility]   |
|                 |                          |                   | LABORATORY   |
+-----------------+--------------------------+-------------------+--------------+
| PLT             | 196                      | 150 - 400 K/uL    | [facility]   |
|                 |                          |                   | LABORATORY   |
+-----------------+--------------------------+-------------------+--------------+
| MPV             | 9.9                      | fl                | [facility]   |
|                 |                          |                   | LABORATORY   |
+-----------------+--------------------------+-------------------+--------------+
| DIFF TYPE       | AUTOMATED                |                   | [facility]   |
|                 |                          |                   | LABORATORY   |
+-----------------+--------------------------+-------------------+--------------+
| NEUTROPHILS     | 77.94                    | %                 | [facility]   |
|                 |                          |                   | LABORATORY   |
+-----------------+--------------------------+-------------------+--------------+
| LYMPHOCYTES     | 13.46                    | %                 | [facility]   |
|                 |                          |                   | LABORATORY   |
+-----------------+--------------------------+-------------------+--------------+
| MONOCYTES       | 7.79                     | %                 | [facility]   |
|                 |                          |                   | LABORATORY   |
+-----------------+--------------------------+-------------------+--------------+
| EOSINOPHILS     | 0.36                     | %                 | [facility]   |
|                 |                          |                   | LABORATORY   |
+-----------------+--------------------------+-------------------+--------------+
| BASOPHILS       | 0.45                     | %                 | [facility]   |
|                 |                          |                   | LABORATORY   |
+-----------------+--------------------------+-------------------+--------------+
| NEUTROPHILS ABS | 8.79 (H)                 | 1.90 - 7.40 K/uL  | [facility]   |
|                 |                          |                   | LABORATORY   |
+-----------------+--------------------------+-------------------+--------------+
| LYMPHOCYTES ABS | 1.52                     | 1.00 - 3.90 K/uL  | [facility]   |
|                 |                          |                   | LABORATORY   |
+-----------------+--------------------------+-------------------+--------------+
| MONOCYTES ABS   | 0.88 (H)                 | 0.00 - 0.80 K/uL  | [facility]   |
|                 |                          |                   | LABORATORY   |
+-----------------+--------------------------+-------------------+--------------+
| EOSINOPHILS ABS | 0.04                     | 0.00 - 0.50 K/uL  | [facility]   |
|                 |                          |                   | LABORATORY   |
+-----------------+--------------------------+-------------------+--------------+
| BASOPHILS ABS   | 0.05Comment: Testing     | 0.00 - 0.10 K/uL  | [facility]   |
|                 | performed at [facility], [address] |                   | LABORATORY   |
|                 |  [place],        |                   |              |
|                 | [place]  [address]     |                   |              |
+-----------------+--------------------------+-------------------+--------------+
 
 
 
+----------+
| Specimen |
+----------+
 
| Blood    |
+----------+
 
 
 
+---------------+-------------------------+---------------------+----------------+
| Performing    | Address                 | City/State/Zipcode  | Phone Number   |
| Organization  |                         |                     |                |
+---------------+-------------------------+---------------------+----------------+
|   [facility]  |   [address]  | [place] [address] |   [phone] |
| LABORATORY    | [address]                   |                     |                |
+---------------+-------------------------+---------------------+----------------+
 Comprehensive Metabolic Panel (2018  4:20 AM)
 
+----------------+--------------------------+-------------------+--------------+
| Component      | Value                    | Ref Range         | Performed At |
+----------------+--------------------------+-------------------+--------------+
| SODIUM         | 143                      | 135 - 145 mmol/L  | [facility]   |
|                |                          |                   | LABORATORY   |
+----------------+--------------------------+-------------------+--------------+
| POTASSIUM      | 3.5                      | 3.5 - 4.9 mmol/L  | [facility]   |
|                |                          |                   | LABORATORY   |
+----------------+--------------------------+-------------------+--------------+
| CHLORIDE       | 110 (H)                  | 99 - 109 mmol/L   | [facility]   |
|                |                          |                   | LABORATORY   |
+----------------+--------------------------+-------------------+--------------+
| CO2            | 25                       | 23 - 32 mmol/L    | [facility]   |
|                |                          |                   | LABORATORY   |
+----------------+--------------------------+-------------------+--------------+
| ANION GAP AGAP | 12                       | 5 - 20 mmol/L     | [facility]   |
|                |                          |                   | LABORATORY   |
+----------------+--------------------------+-------------------+--------------+
| GLUCOSE        | 129 (H)                  | 65 - 99 mg/dL     | [facility]   |
|                |                          |                   | LABORATORY   |
+----------------+--------------------------+-------------------+--------------+
| BUN            | 17                       | 8 - 25 mg/dL      | [facility]   |
|                |                          |                   | LABORATORY   |
+----------------+--------------------------+-------------------+--------------+
| CREATININE     | 0.8                      | 0.70 - 1.30 mg/dL | [facility]   |
|                |                          |                   | LABORATORY   |
+----------------+--------------------------+-------------------+--------------+
| BUN/CREAT      | 21                       |                   | [facility]   |
|                |                          |                   | LABORATORY   |
+----------------+--------------------------+-------------------+--------------+
| CALCIUM        | 8.5                      | 8.5 - 10.5 mg/dL  | [facility]   |
|                |                          |                   | LABORATORY   |
+----------------+--------------------------+-------------------+--------------+
| TOTAL PROTEIN  | 6.8                      | 6.3 - 8.2 g/dL    | [facility]   |
|                |                          |                   | LABORATORY   |
+----------------+--------------------------+-------------------+--------------+
| Albumin        | 2.8 (L)                  | 3.3 - 4.8 g/dL    | [facility]   |
|                |                          |                   | LABORATORY   |
+----------------+--------------------------+-------------------+--------------+
| GLOBULIN       | 4.0                      | 1.3 - 4.9 g/dL    | [facility]   |
|                |                          |                   | LABORATORY   |
+----------------+--------------------------+-------------------+--------------+
| A/G            | 0.7 (L)                  | 1.0 - 2.4         | [facility]   |
|                |                          |                   | LABORATORY   |
+----------------+--------------------------+-------------------+--------------+
| TBIL           | 0.2                      | 0.1 - 1.5 mg/dL   | [facility]   |
 
|                |                          |                   | LABORATORY   |
+----------------+--------------------------+-------------------+--------------+
| ALK PHOS       | 76                       | 35 - 115 U/L      | [facility]   |
|                |                          |                   | LABORATORY   |
+----------------+--------------------------+-------------------+--------------+
| AST            | 20                       | 10 - 45 U/L       | [facility]   |
|                |                          |                   | LABORATORY   |
+----------------+--------------------------+-------------------+--------------+
| ALT            | 26                       | 10 - 65 U/L       | [facility]   |
|                |                          |                   | LABORATORY   |
+----------------+--------------------------+-------------------+--------------+
| EGFR           | >60Comment: GFR <60:     | >60 mL/min/1.73m2 | [facility]   |
|                | CHRONIC KIDNEY DISEASE,  |                   | LABORATORY   |
|                | IF FOUND OVER A 3 MONTH  |                   |              |
|                | PERIOD.GFR <15: KIDNEY   |                   |              |
|                | FAILURE.FOR       |                   |              |
|                | [employer], MULTIPLY THE  |                   |              |
|                | CALCULATED GFR BY        |                   |              |
|                | 1.210.This eGFR is       |                   |              |
|                | calculated using the     |                   |              |
|                | MDRD IDMS traceable      |                   |              |
|                | equation.Testing         |                   |              |
|                | performed at [facility], [address] |                   |              |
|                |  [facility],        |                   |              |
|                | [place]    [address]   |                   |              |
+----------------+--------------------------+-------------------+--------------+
 
 
 
+----------+
| Specimen |
+----------+
| Blood    |
+----------+
 
 
 
+---------------+-------------------------+---------------------+----------------+
| Performing    | Address                 | City/State/Zipcode  | Phone Number   |
| Organization  |                         |                     |                |
+---------------+-------------------------+---------------------+----------------+
|   [facility]  |   [address] [place] [address]  | [place] [address] |   [phone] |
| LABORATORY    | [facility].                   |                     |                |
+---------------+-------------------------+---------------------+----------------+
 CBC W/Auto Diff (Reflex to Manual) (2018  4:20 AM)
 
+-----------------+--------------------------+-------------------+--------------+
| Component       | Value                    | Ref Range         | Performed At |
+-----------------+--------------------------+-------------------+--------------+
| WBC             | 12.32 (H)                | 3.80 - 11.00 K/uL | [facility]   |
|                 |                          |                   | LABORATORY   |
+-----------------+--------------------------+-------------------+--------------+
| RBC             | 4.04 (L)                 | 4.20 - 5.70 M/uL  | [facility]   |
|                 |                          |                   | LABORATORY   |
+-----------------+--------------------------+-------------------+--------------+
| HGB             | 13.3                     | 13.2 - 17.0 g/dL  | [facility]   |
|                 |                          |                   | LABORATORY   |
+-----------------+--------------------------+-------------------+--------------+
| HCT             | 39.2                     | 39.0 - 50.0 %     | [facility]   |
|                 |                          |                   | LABORATORY   |
 
+-----------------+--------------------------+-------------------+--------------+
| MCV             | 96.8                     | 80.0 - 100.0 fl   | [facility]   |
|                 |                          |                   | LABORATORY   |
+-----------------+--------------------------+-------------------+--------------+
| MCH             | 32.8                     | 27.0 - 34.0 pg    | [facility]   |
|                 |                          |                   | LABORATORY   |
+-----------------+--------------------------+-------------------+--------------+
| MCHC            | 33.9                     | 32.0 - 35.5 g/dL  | [facility]   |
|                 |                          |                   | LABORATORY   |
+-----------------+--------------------------+-------------------+--------------+
| RDW SD          | 49.0                     | 37 - 53 fl        | [facility]   |
|                 |                          |                   | LABORATORY   |
+-----------------+--------------------------+-------------------+--------------+
| PLT             | 186                      | 150 - 400 K/uL    | [facility]   |
|                 |                          |                   | LABORATORY   |
+-----------------+--------------------------+-------------------+--------------+
| MPV             | 9.9                      | fl                | [facility]   |
|                 |                          |                   | LABORATORY   |
+-----------------+--------------------------+-------------------+--------------+
| DIFF TYPE       | AUTOMATED                |                   | [facility]   |
|                 |                          |                   | LABORATORY   |
+-----------------+--------------------------+-------------------+--------------+
| NEUTROPHILS     | 80.24                    | %                 | [facility]   |
|                 |                          |                   | LABORATORY   |
+-----------------+--------------------------+-------------------+--------------+
| LYMPHOCYTES     | 11.24                    | %                 | [facility]   |
|                 |                          |                   | LABORATORY   |
+-----------------+--------------------------+-------------------+--------------+
| MONOCYTES       | 7.82                     | %                 | [facility]   |
|                 |                          |                   | LABORATORY   |
+-----------------+--------------------------+-------------------+--------------+
| EOSINOPHILS     | 0.39                     | %                 | [facility]   |
|                 |                          |                   | LABORATORY   |
+-----------------+--------------------------+-------------------+--------------+
| BASOPHILS       | 0.31                     | %                 | [facility]   |
|                 |                          |                   | LABORATORY   |
+-----------------+--------------------------+-------------------+--------------+
| NEUTROPHILS ABS | 9.89 (H)                 | 1.90 - 7.40 K/uL  | [facility]   |
|                 |                          |                   | LABORATORY   |
+-----------------+--------------------------+-------------------+--------------+
| LYMPHOCYTES ABS | 1.38                     | 1.00 - 3.90 K/uL  | [facility]   |
|                 |                          |                   | LABORATORY   |
+-----------------+--------------------------+-------------------+--------------+
| MONOCYTES ABS   | 0.96 (H)                 | 0.00 - 0.80 K/uL  | [facility]   |
|                 |                          |                   | LABORATORY   |
+-----------------+--------------------------+-------------------+--------------+
| EOSINOPHILS ABS | 0.05                     | 0.00 - 0.50 K/uL  | [facility]   |
|                 |                          |                   | LABORATORY   |
+-----------------+--------------------------+-------------------+--------------+
| BASOPHILS ABS   | 0.04Comment: Testing     | 0.00 - 0.10 K/uL  | [facility]   |
|                 | performed at [facility], [address] |                   | LABORATORY   |
|                 |  [place],        |                   |              |
|                 | [place]  [address]     |                   |              |
+-----------------+--------------------------+-------------------+--------------+
 
 
 
+----------+
| Specimen |
+----------+
 
| Blood    |
+----------+
 
 
 
+---------------+-------------------------+---------------------+----------------+
| Performing    | Address                 | City/State/Zipcode  | Phone Number   |
| Organization  |                         |                     |                |
+---------------+-------------------------+---------------------+----------------+
|   [facility]  |   [address]  | [place] [address] |   [phone] |
| LABORATORY    | [address]                   |                     |                |
+---------------+-------------------------+---------------------+----------------+
 Sputum culture (2018  8:00 AM)
 
+----------------------+--------------------------+-----------+--------------+
| Component            | Value                    | Ref Range | Performed At |
+----------------------+--------------------------+-----------+--------------+
| Specimen Description | SPUTUM                   |           | [facility]   |
|                      |                          |           | LABORATORY   |
+----------------------+--------------------------+-----------+--------------+
| GRAM STAIN           | GREATER THAN 10 SEC/LPF  |           | [facility]   |
|                      |                          |           | LABORATORY   |
+----------------------+--------------------------+-----------+--------------+
| GRAM STAIN           | LESS THAN 10 WBCS/LPF    |           | [facility]   |
|                      |                          |           | LABORATORY   |
+----------------------+--------------------------+-----------+--------------+
| GRAM STAIN           | 3+                       |           | [facility]   |
|                      |                          |           | LABORATORY   |
+----------------------+--------------------------+-----------+--------------+
| GRAM STAIN           | GRAM POSITIVE COCCI      |           | [facility]   |
|                      |                          |           | LABORATORY   |
+----------------------+--------------------------+-----------+--------------+
| CULTURE              | SMEAR CONTAINS GREATER   |           | [facility]   |
|                      | THAN 10 SEC/LPF          |           | LABORATORY   |
|                      | SUGGESTIVE OF POOR       |           |              |
|                      | QUALITY SPECIMEN.        |           |              |
|                      | SPECIMEN WILL NOT BE     |           |              |
|                      | CULTURED OR WILL BE      |           |              |
|                      | CULTURED BY SPECIAL      |           |              |
|                      | REQUEST ONLY.  PLEASE    |           |              |
|                      | RECOLLECT IF CLINICALLY  |           |              |
|                      | INDICATED.  SPECIMEN     |           |              |
|                      | WILL BE HELD 48 HOURS.   |           |              |
+----------------------+--------------------------+-----------+--------------+
 
 
 
+-----------------+
| Specimen        |
+-----------------+
| Sputum - Sputum |
+-----------------+
 
 
 
+---------------+-------------------------+---------------------+----------------+
| Performing    | Address                 | City/State/Zipcode  | Phone Number   |
| Organization  |                         |                     |                |
+---------------+-------------------------+---------------------+----------------+
|   [facility]  |   [address]  | [place] [address] |   [phone] |
 
| LABORATORY    | [address].                   |                     |                |
+---------------+-------------------------+---------------------+----------------+
 Comprehensive Metabolic Panel (2018  4:47 AM)
 
+----------------+--------------------------+-------------------+--------------+
| Component      | Value                    | Ref Range         | Performed At |
+----------------+--------------------------+-------------------+--------------+
| SODIUM         | 143                      | 135 - 145 mmol/L  | [facility]   |
|                |                          |                   | LABORATORY   |
+----------------+--------------------------+-------------------+--------------+
| POTASSIUM      | 3.7                      | 3.5 - 4.9 mmol/L  | [facility]   |
|                |                          |                   | LABORATORY   |
+----------------+--------------------------+-------------------+--------------+
| CHLORIDE       | 108                      | 99 - 109 mmol/L   | [facility]   |
|                |                          |                   | LABORATORY   |
+----------------+--------------------------+-------------------+--------------+
| CO2            | 28                       | 23 - 32 mmol/L    | [facility]   |
|                |                          |                   | LABORATORY   |
+----------------+--------------------------+-------------------+--------------+
| ANION GAP AGAP | 11                       | 5 - 20 mmol/L     | [facility]   |
|                |                          |                   | LABORATORY   |
+----------------+--------------------------+-------------------+--------------+
| GLUCOSE        | 153 (H)                  | 65 - 99 mg/dL     | [facility]   |
|                |                          |                   | LABORATORY   |
+----------------+--------------------------+-------------------+--------------+
| BUN            | 15                       | 8 - 25 mg/dL      | [facility]   |
|                |                          |                   | LABORATORY   |
+----------------+--------------------------+-------------------+--------------+
| CREATININE     | 0.9                      | 0.70 - 1.30 mg/dL | [facility]   |
|                |                          |                   | LABORATORY   |
+----------------+--------------------------+-------------------+--------------+
| BUN/CREAT      | 17                       |                   | [facility]   |
|                |                          |                   | LABORATORY   |
+----------------+--------------------------+-------------------+--------------+
| CALCIUM        | 8.5                      | 8.5 - 10.5 mg/dL  | [facility]   |
|                |                          |                   | LABORATORY   |
+----------------+--------------------------+-------------------+--------------+
| TOTAL PROTEIN  | 6.7                      | 6.3 - 8.2 g/dL    | [facility]   |
|                |                          |                   | LABORATORY   |
+----------------+--------------------------+-------------------+--------------+
| Albumin        | 3.1 (L)                  | 3.3 - 4.8 g/dL    | [facility]   |
|                |                          |                   | LABORATORY   |
+----------------+--------------------------+-------------------+--------------+
| GLOBULIN       | 3.6                      | 1.3 - 4.9 g/dL    | [facility]   |
|                |                          |                   | LABORATORY   |
+----------------+--------------------------+-------------------+--------------+
| A/G            | 0.9 (L)                  | 1.0 - 2.4         | [facility]   |
|                |                          |                   | LABORATORY   |
+----------------+--------------------------+-------------------+--------------+
| TBIL           | 0.4                      | 0.1 - 1.5 mg/dL   | [facility]   |
|                |                          |                   | LABORATORY   |
+----------------+--------------------------+-------------------+--------------+
| ALK PHOS       | 69                       | 35 - 115 U/L      | [facility]   |
|                |                          |                   | LABORATORY   |
+----------------+--------------------------+-------------------+--------------+
| AST            | 14                       | 10 - 45 U/L       | [facility]   |
|                |                          |                   | LABORATORY   |
+----------------+--------------------------+-------------------+--------------+
| ALT            | 23                       | 10 - 65 U/L       | [facility]   |
|                |                          |                   | LABORATORY   |
 
+----------------+--------------------------+-------------------+--------------+
| EGFR           | >60Comment: GFR <60:     | >60 mL/min/1.73m2 | [facility]   |
|                | CHRONIC KIDNEY DISEASE,  |                   | LABORATORY   |
|                | IF FOUND OVER A 3 MONTH  |                   |              |
|                | PERIOD.GFR <15: KIDNEY   |                   |              |
|                | FAILURE.FOR       |                   |              |
|                | AMERICANS, MULTIPLY THE  |                   |              |
|                | CALCULATED GFR BY        |                   |              |
|                | 1.210.This eGFR is       |                   |              |
|                | calculated using the     |                   |              |
|                | MDRD IDMS traceable      |                   |              |
|                | equation.Testing         |                   |              |
|                | performed at [facility], [address] |                   |              |
|                |  [facility],        |                   |              |
|                | [place]    [address]   |                   |              |
+----------------+--------------------------+-------------------+--------------+
 
 
 
+----------+
| Specimen |
+----------+
| Blood    |
+----------+
 
 
 
+---------------+-------------------------+---------------------+----------------+
| Performing    | Address                 | City/State/Zipcode  | Phone Number   |
| Organization  |                         |                     |                |
+---------------+-------------------------+---------------------+----------------+
|   [facility]  |   [address]  | [place] [address] |   [phone] |
| LABORATORY    | [address]                   |                     |                |
+---------------+-------------------------+---------------------+----------------+
 CBC W/Auto Diff (Reflex to Manual) (2018  4:47 AM)
 
+----------------------+-------------------------+-------------------+--------------+
| Component            | Value                   | Ref Range         | Performed At |
+----------------------+-------------------------+-------------------+--------------+
| WBC                  | 10.93                   | 3.80 - 11.00 K/uL | [facility]   |
|                      |                         |                   | LABORATORY   |
+----------------------+-------------------------+-------------------+--------------+
| RBC                  | 3.96 (L)                | 4.20 - 5.70 M/uL  | [facility]   |
|                      |                         |                   | LABORATORY   |
+----------------------+-------------------------+-------------------+--------------+
| HGB                  | 13.0 (L)                | 13.2 - 17.0 g/dL  | [facility]   |
|                      |                         |                   | LABORATORY   |
+----------------------+-------------------------+-------------------+--------------+
| HCT                  | 37.9 (L)                | 39.0 - 50.0 %     | [facility]   |
|                      |                         |                   | LABORATORY   |
+----------------------+-------------------------+-------------------+--------------+
| MCV                  | 95.6                    | 80.0 - 100.0 fl   | [facility]   |
|                      |                         |                   | LABORATORY   |
+----------------------+-------------------------+-------------------+--------------+
| MCH                  | 32.8                    | 27.0 - 34.0 pg    | [facility]   |
|                      |                         |                   | LABORATORY   |
+----------------------+-------------------------+-------------------+--------------+
| MCHC                 | 34.4                    | 32.0 - 35.5 g/dL  | [facility]   |
|                      |                         |                   | LABORATORY   |
+----------------------+-------------------------+-------------------+--------------+
 
| RDW SD               | 49.0                    | 37 - 53 fl        | [facility]   |
|                      |                         |                   | LABORATORY   |
+----------------------+-------------------------+-------------------+--------------+
| PLT                  | 180                     | 150 - 400 K/uL    | [facility]   |
|                      |                         |                   | LABORATORY   |
+----------------------+-------------------------+-------------------+--------------+
| MPV                  | 10.1                    | fl                | [facility]   |
|                      |                         |                   | LABORATORY   |
+----------------------+-------------------------+-------------------+--------------+
| DIFF TYPE            | MANUAL                  |                   | [facility]   |
|                      |                         |                   | LABORATORY   |
+----------------------+-------------------------+-------------------+--------------+
| Neutrophils Manual   | 91                      | %                 | [facility]   |
|                      |                         |                   | LABORATORY   |
+----------------------+-------------------------+-------------------+--------------+
| Bands                | 1                       | %                 | [facility]   |
|                      |                         |                   | LABORATORY   |
+----------------------+-------------------------+-------------------+--------------+
| Lymphocytes Manual   | 8                       | %                 | [facility]   |
|                      |                         |                   | LABORATORY   |
+----------------------+-------------------------+-------------------+--------------+
| Neutrophils Absolute | 9.95 (H)                | 1.90 - 7.40 K/uL  | [facility]   |
|                      |                         |                   | LABORATORY   |
+----------------------+-------------------------+-------------------+--------------+
| Bands Manual         | 0.11                    | 0.00 - 0.20 K/uL  | [facility]   |
|                      |                         |                   | LABORATORY   |
+----------------------+-------------------------+-------------------+--------------+
| Lymphocytes Absolute | 0.87 (L)                | 1.00 - 3.90 K/uL  | [facility]   |
|                      |                         |                   | LABORATORY   |
+----------------------+-------------------------+-------------------+--------------+
| MORPHOLOGY           | RBC AND PLT MORPHOLOGY  |                   | [facility]   |
|                      | APPEAR NORMALComment:   |                   | LABORATORY   |
|                      | Testing performed at    |                   |              |
|                      | [facility], [address]  |                   |              |
|                      | [facility], [place]     |                   |              |
|                      | [address]                   |                   |              |
+----------------------+-------------------------+-------------------+--------------+
 
 
 
+----------+
| Specimen |
+----------+
| Blood    |
+----------+
 
 
 
+---------------+-------------------------+---------------------+----------------+
| Performing    | Address                 | City/State/Zipcode  | Phone Number   |
| Organization  |                         |                     |                |
+---------------+-------------------------+---------------------+----------------+
|   [facility]  |   [address]  | [place] [address] |   [phone] |
| LABORATORY    | [address]                   |                     |                |
+---------------+-------------------------+---------------------+----------------+
 Phosphorus (2018  4:47 AM)
 
+------------+--------------------------+-----------------+--------------+
| Component  | Value                    | Ref Range       | Performed At |
+------------+--------------------------+-----------------+--------------+
 
| PHOSPHORUS | 2.1 (L)Comment: Testing  | 2.3 - 4.8 mg/dL | [facility]   |
|            | performed at [facility], [address] |                 | LABORATORY   |
|            |  [facility],        |                 |              |
|            | [place]  [address]     |                 |              |
+------------+--------------------------+-----------------+--------------+
 
 
 
+----------+
| Specimen |
+----------+
| Blood    |
+----------+
 
 
 
+---------------+-------------------------+---------------------+----------------+
| Performing    | Address                 | City/State/Zipcode  | Phone Number   |
| Organization  |                         |                     |                |
+---------------+-------------------------+---------------------+----------------+
|   [facility]  |   [address]  | [place] [address] |   [phone] |
| LABORATORY    | [address]                   |                     |                |
+---------------+-------------------------+---------------------+----------------+
 Magnesium (2018  4:47 AM)
 
+-----------+--------------------------+-----------------+--------------+
| Component | Value                    | Ref Range       | Performed At |
+-----------+--------------------------+-----------------+--------------+
| MAGNESIUM | 2.3Comment: Testing      | 1.7 - 2.4 mg/dL | [facility]   |
|           | performed at [facility], [address] |                 | LABORATORY   |
|           |  [address],        |                 |              |
|           | [place]  [address]     |                 |              |
+-----------+--------------------------+-----------------+--------------+
 
 
 
+----------+
| Specimen |
+----------+
| Blood    |
+----------+
 
 
 
+---------------+-------------------------+---------------------+----------------+
| Performing    | Address                 | City/State/Zipcode  | Phone Number   |
| Organization  |                         |                     |                |
+---------------+-------------------------+---------------------+----------------+
|   [facility]  |   [address]  | [place] [address] |   [phone] |
| LABORATORY    | [address]                   |                     |                |
+---------------+-------------------------+---------------------+----------------+
 CTA Chest Pulmonary Embolism w IV con (2018 11:44 PM)
 
+------------------------------------------------------------------------+--------------+
| Impressions                                                            | Performed At |
+------------------------------------------------------------------------+--------------+
|      1. Some images are degraded due to motion artifact. No obvious    |   [facility]     |
| pulmonary emboli.   2. Emphysema with pulmonary vascular congestion    | RADIOLOGY    |
| and bilateral infiltrate or atelectasis, right greater than left.   3. |              |
|  Dilated ascending aorta measuring 4.4 cm. No aortic dissection.   4.  |              |
 
| Marked tracheomalacia.     [facility]      Electronically signed by Alex    |              |
| MD Clay on 2018 12:56AM Referring Provider Line:             |              |
| [phone] ID: [id number]                                               |              |
+------------------------------------------------------------------------+--------------+
 
 
 
+------------------------------------------------------------------------+--------------+
| Narrative                                                              | Performed At |
+------------------------------------------------------------------------+--------------+
|   EXAM:  CT ANGIOGRAM CHEST     EXAM DATE: 2018 11:45 PM.         |   [facility]     |
| CLINICAL HISTORY: Hypoxemia. Dyspnea. COPD.     COMPARISON:            | RADIOLOGY    |
| 2018.     TECHNIQUE: Routine helical imaging was performed       |              |
| through the chest in the pulmonary arterial phase. IV Contrast:        |              |
| Nonionic. Reconstructions: Coronal 3-D MIP reconstructions.Sagittal    |              |
| and coronal.     In accordance with CT protocol optimization, one or   |              |
| more of the following dose reduction techniques were utilized for this |              |
|  exam: automated exposure control, adjustment of mA and/or KV based on |              |
|  patient size, or use of iterative reconstructive technique.           |              |
| FINDINGS:   Pulmonary Arteries:   Diagnostic quality: Suboptimal       |              |
| through the segmental arteries. Pulmonary arteries are well enhanced   |              |
| but some images are degraded due to motion artifact. No obvious        |              |
| pulmonary emboli.      No evidence of right heart strain.              |              |
| Lungs/Pleura: Emphysema. Pulmonary vascular congestion. Bilateral      |              |
| infiltrate or atelectasis, right greater than left. No pleural         |              |
| effusion seen. No pneumothorax.     Mediastinum: Heart size normal to  |              |
| upper normal. No lymphadenopathy seen. Marked tracheomalacia.          |              |
| Thoracic Aorta: Ascending aorta measures 4.4 cm. Mild atherosclerosis. |              |
|  No aortic dissection.     Upper Abdomen: Possible fatty liver.        |              |
| Other: None.                                                           |              |
+------------------------------------------------------------------------+--------------+
 
 
 
+-------------------------------------------------------------------------------------------
------------------------+
| Procedure Note                                                                            
                        |
+-------------------------------------------------------------------------------------------
------------------------+
|   Edil, Rad Results In - 2018 12:56 AM PDT  EXAM:CT ANGIOGRAM CHESTEXAM DATE:        
                        |
| 2018 11:45 PM.CLINICAL HISTORY: Hypoxemia. Dyspnea. COPD.COMPARISON:                 
                        |
| 2018.TECHNIQUE: Routine helical imaging was performed through the chest in the      
                        |
| pulmonary arterial phase. IV Contrast: Nonionic. Reconstructions: Coronal 3-D MIP         
                        |
| reconstructions.Sagittal and coronal.In accordance with CT protocol optimization, one or  
                        |
|  more of the following dose reduction techniques were utilized for this exam: automated   
                        |
| exposure control, adjustment of mA and/or KV based on patient size, or use of iterative   
                        |
| reconstructive technique.FINDINGS: Pulmonary Arteries: Diagnostic quality: Suboptimal     
                        |
| through the segmental arteries. Pulmonary arteries are well enhanced but some images are  
                        |
|  degraded due to motion artifact. No obvious pulmonary emboli. No evidence of right       
                        |
 
| heart strain.Lungs/Pleura: Emphysema. Pulmonary vascular congestion. Bilateral            
                        |
| infiltrate or atelectasis, right greater than left. No pleural effusion seen. No          
                        |
| pneumothorax.Mediastinum: Heart size normal to upper normal. No lymphadenopathy seen.     
                        |
| Marked tracheomalacia. Thoracic Aorta: Ascending aorta measures 4.4 cm. Mild              
                        |
| atherosclerosis. No aortic dissection.Upper Abdomen: Possible fatty liver.Other:          
                        |
| None.IMPRESSION:1. Some images are degraded due to motion artifact. No obvious pulmonary  
                        |
|  emboli. 2. Emphysema with pulmonary vascular congestion and bilateral infiltrate or      
                        |
| atelectasis, right greater than left. 3. Dilated ascending aorta measuring 4.4 cm. No     
                        |
| aortic dissection. 4. Marked tracheomalacia.[facility] Electronically signed by Alex Williamson   
                        |
| MD on 2018 12:56AM Referring Provider Line: [phone] ID: [id number]               
                        |
|Mediastinum: Heart size normal to upper normal. No lymphadenopathy seen. Marked tracheomala
adriane.                    |
|                                                                                           
                        |
|Thoracic Aorta: Ascending aorta measures 4.4 cm. Mild atherosclerosis. No aortic dissection
.                       |
|                                                                                           
                        |
|Upper Abdomen: Possible fatty liver.                                                       
                        |
|Other: None.                                                                               
                        |
|IMPRESSION:                                                                                
                       |
|1. Some images are degraded due to motion artifact. No obvious pulmonary emboli.           
                        |
|2. Emphysema with pulmonary vascular congestion and bilateral infiltrate or atelectasis, ri
ght greater than left.  |
|3. Dilated ascending aorta measuring 4.4 cm. No aortic dissection.                         
                        |
|4. Marked tracheomalacia.                                                                  
                        |
|[facility]                                                                                      
                       |
| Electronically signed by Alex Williamson MD on 2018 12:56AM Referring Provider Line: [phone]
[phone] ID: [id number] |
+-------------------------------------------------------------------------------------------
------------------------+
 
 
 
 
+--------------------+------------------+--------------------+--------------+
| Performing         | Address          | City/State/Zipcode | Phone Number |
| Organization       |                  |                    |              |
+--------------------+------------------+--------------------+--------------+
|   [facility] |   [address] | [place] [address] |              |
+--------------------+------------------+--------------------+--------------+
 POC Arterial Blood Gas (2018 11:11 PM)
 
+------------------+--------------------------+------------------+-----------------+
| Component        | Value                    | Ref Range        | Performed At    |
+------------------+--------------------------+------------------+-----------------+
| pH, Art          | 7.341 (L)                | 7.350 - 7.450    | [facility] LABORAT[facility] |
+------------------+--------------------------+------------------+-----------------+
| POC PCO2         | 42                       | 35 - 45 mmHg     | [facility] LAB[facility] |
+------------------+--------------------------+------------------+-----------------+
| POC p02          | 93                       | 80 - 105 mmHg    | [facility] |
+------------------+--------------------------+------------------+-----------------+
| POC HCO3         | 23                       | 22 - 26 mmol/L   | [facility] LAB[facility]AT[facility] |
+------------------+--------------------------+------------------+-----------------+
| POC TCO2         | 24                       | 23 - 27 mEq/L    | [facility] LAB[facility]AT[facility] |
+------------------+--------------------------+------------------+-----------------+
| POC BASE DEFICIT | 3 (H)                    | 0.0 - 2.0 mmol/L | [facility] |
+------------------+--------------------------+------------------+-----------------+
| POC S02          | 97                       | 95 - 98 %        | [facility] |
+------------------+--------------------------+------------------+-----------------+
| POC COMMENTS     | Ariel Test not           |                  | [facility] |
|                  | indicatedComment: Site = |                  |                 |
|                  |  right radialTesting     |                  |                 |
|                  | performed at [facility]     |                  |                 |
|                  | [address];[place], [place]   |                  |                 |
|                  | [address]                    |                  |                 |
+------------------+--------------------------+------------------+-----------------+
 
 
 
+-------------------+------------------+--------------------+--------------+
| Performing        | Address          | City/State/Zipcode | Phone Number |
| Organization      |                  |                    |              |
+-------------------+------------------+--------------------+--------------+
|   [facility] |   [address] | [place] [address] |              |
+-------------------+------------------+--------------------+--------------+
 Respiratory Filmarray (2018 10:34 PM)
 
+----------------------+-------------------------+--------------+--------------+
| Component            | Value                   | Ref Range    | Performed At |
+----------------------+-------------------------+--------------+--------------+
| ADENOVIRUS           | Not Detected            | Not Detected | [facility]   |
|                      |                         |              | LABORATORY   |
+----------------------+-------------------------+--------------+--------------+
| CORONAVIRUS 229E     | Not Detected            | Not Detected | [facility]   |
|                      |                         |              | LABORATORY   |
+----------------------+-------------------------+--------------+--------------+
| CORONAVIRUS HKU1     | Not Detected            | Not Detected | [facility]   |
|                      |                         |              | LABORATORY   |
+----------------------+-------------------------+--------------+--------------+
| CORONAVIRUS NL63     | Not Detected            | Not Detected | [facility]   |
|                      |                         |              | LABORATORY   |
 
+----------------------+-------------------------+--------------+--------------+
| CORONAVIRUS OC43     | Not Detected            | Not Detected | [facility]   |
|                      |                         |              | LABORATORY   |
+----------------------+-------------------------+--------------+--------------+
| HUMAN                | Not Detected            | Not Detected | [facility]   |
| [facility]      |                         |              | LABORATORY   |
+----------------------+-------------------------+--------------+--------------+
| HUMAN RHINO/ENTERO   | Not Detected            | Not Detected | [facility]   |
|                      |                         |              | LABORATORY   |
+----------------------+-------------------------+--------------+--------------+
| INFLUENZA A          | Not Detected            | Not Detected | [facility]   |
|                      |                         |              | LABORATORY   |
+----------------------+-------------------------+--------------+--------------+
| INFLUENZA B          | Not Detected            | Not Detected | [facility]   |
|                      |                         |              | LABORATORY   |
+----------------------+-------------------------+--------------+--------------+
| PARAINFLUENZA 1      | Not Detected            | Not Detected | [facility]   |
|                      |                         |              | LABORATORY   |
+----------------------+-------------------------+--------------+--------------+
| PARAINFLUENZA 2      | Not Detected            | Not Detected | [facility]   |
|                      |                         |              | LABORATORY   |
+----------------------+-------------------------+--------------+--------------+
| PARAINFLUENZA 3      | Not Detected            | Not Detected | [facility]   |
|                      |                         |              | LABORATORY   |
+----------------------+-------------------------+--------------+--------------+
| PARAINFLUENZA 4      | Not Detected            | Not Detected | [facility]   |
|                      |                         |              | LABORATORY   |
+----------------------+-------------------------+--------------+--------------+
| RESP SYNCYTIAL VIRUS | Not Detected            | Not Detected | [facility]   |
|                      |                         |              | LABORATORY   |
+----------------------+-------------------------+--------------+--------------+
| BORDETELLA PERTUSSIS | Not Detected            | Not Detected | [facility]   |
|                      |                         |              | LABORATORY   |
+----------------------+-------------------------+--------------+--------------+
| CHLAMYDIAE           | Not Detected            | Not Detected | [facility]   |
| P[facility]UM[facility]E           |                         |              | LABORATORY   |
+----------------------+-------------------------+--------------+--------------+
| MYCOPLASMA           | Not Detected            | Not Detected | [facility]   |
| P[facility]UMON[facility]E           |                         |              | LABORATORY   |
+----------------------+-------------------------+--------------+--------------+
| RESP PANEL INTERP    | Testing performed by    |              | [facility]   |
|                      | Molecular               |              | LABORATORY   |
|                      | MethodologyComment:     |              |              |
|                      | Testing performed at    |              |              |
|                      | [facility], [address] [place] [address]  |              |              |
|                      | [place]     |              |              |
|                      | [id number]                   |              |              |
+----------------------+-------------------------+--------------+--------------+
 
 
 
+-----------------+
| Specimen        |
+-----------------+
| Nasopharyngeal  |
| Culture         |
+-----------------+
 
 
 
 
+---------------+-------------------------+---------------------+----------------+
| Performing    | Address                 | City/State/Zipcode  | Phone Number   |
| Organization  |                         |                     |                |
+---------------+-------------------------+---------------------+----------------+
|   [facility]  |   [address] [facility]  | [place] [address] |   [phone] |
| LABORATORY    | [address]                   |                     |                |
+---------------+-------------------------+---------------------+----------------+
 US Lower extremity venous doppler bilateral (2018  9:50 PM)
 
+--------------------------------------------------------------------+--------------+
| Impressions                                                        | Performed At |
+--------------------------------------------------------------------+--------------+
|   1.    No evidence of lower extremity deep vein thrombosis.       |   [facility]     |
| Electronically signed by Mega Tinoco MD on 2018 9:48 PM | RADIOLOGY    |
+--------------------------------------------------------------------+--------------+
 
 
 
+------------------------------------------------------------------------+--------------+
| Narrative                                                              | Performed At |
+------------------------------------------------------------------------+--------------+
|   [facility]   LOWER EXTREMITY VENOUS DOPPLER BILAT        |   [facility]     |
| 2018 9:16 PM     HISTORY:  68 years.    Male.    Swelling of the  | RADIOLOGY    |
| left and right lower extremities.     TECHNIQUE:  Bilateral lower      |              |
| extremity venous exam using grayscale, color Doppler and pulsed wave   |              |
| spectral Doppler techniques.     COMPARISON:  None.     FINDINGS:      |              |
| Normal compressibility, augmentation of flow, and Doppler flow evident |              |
|  within the deep venous system. No evidence of deep venous thrombosis. |              |
|                                                                        |              |
+------------------------------------------------------------------------+--------------+
 
 
 
+-------------------------------------------------------------------------------------------
--------------------------------------------------+
| Procedure Note                                                                            
                                                  |
+-------------------------------------------------------------------------------------------
--------------------------------------------------+
|   Edil, Agustin Results In - 2018  9:53 PM PDT  [facility] LOWER EXTREMITY   
                                                  |
| VENOUS DOPPLER BILAT2018 9:16 PMHISTORY:68 years.  Male.  Swelling of the left and   
                                                  |
| right lower extremities.TECHNIQUE:Bilateral lower extremity venous exam using grayscale,  
                                                  |
|  color Doppler and pulsed wave spectral Doppler                                           
                                                  |
| techniques.COMPARISON:None.FINDINGS:Normal compressibility, augmentation of flow, and     
                                                  |
| Doppler flow evident within the deep venous system. No evidence of deep venous            
                                                  |
| thrombosis.IMPRESSION:1.  No evidence of lower extremity deep vein                        
                                                  |
| thrombosis.Electronically signed by Mega Tinoco MD on 2018 9:48 PM             
                                                  |
|Bilateral lower extremity venous exam using grayscale, color Doppler and pulsed wave spectr
al Doppler techniques.                            |
|                                                                                           
                                                  |
|COMPARISON:                                                                                
 
                                                 |
|None.                                                                                      
                                                 |
|FINDINGS:                                                                                  
                                                 |
|Normal compressibility, augmentation of flow, and Doppler flow evident within the deep veno
us system. No evidence of deep venous thrombosis. |
|                                                                                           
                                                  |
|IMPRESSION:                                                                                
                                                 |
|1.  No evidence of lower extremity deep vein thrombosis.                                   
                                                  |
|Electronically signed by Mega Tinoco MD on 2018 9:48 PM                         
                                                  |
+-------------------------------------------------------------------------------------------
--------------------------------------------------+
 
 
 
+--------------------+------------------+--------------------+--------------+
| Performing         | Address          | City/State/Zipcode | Phone Number |
| Organization       |                  |                    |              |
+--------------------+------------------+--------------------+--------------+
|   [facility] |   [address] | [place] [address] |              |
+--------------------+------------------+--------------------+--------------+
 aPTT (2018  8:09 PM)
 
+-----------+-------------------------+-----------------+-----------------+
| Component | Value                   | Ref Range       | Performed At    |
+-----------+-------------------------+-----------------+-----------------+
| APTT      | 31Comment: Testing      | 23 - 32 seconds | [facility] |
|           | performed at [facility];[address]    |                 |                 |
|           | [address];[place]  |                 |                 |
|           | [id number]                   |                 |                 |
+-----------+-------------------------+-----------------+-----------------+
 
 
 
+-------------------+------------------+--------------------+--------------+
| Performing        | Address          | City/State/Zipcode | Phone Number |
| Organization      |                  |                    |              |
+-------------------+------------------+--------------------+--------------+
|   [facility] |   [address] | [place] [address] |              |
+-------------------+------------------+--------------------+--------------+
 Protime-INR (2018  8:09 PM)
 
+-----------+--------------------------+-----------+-----------------+
| Component | Value                    | Ref Range | Performed At    |
+-----------+--------------------------+-----------+-----------------+
| INR       | 1.1Comment: REFERENCE    |           | [facility] |
|           | RANGE:0.9 -              |           |                 |
|           | 1.2    NON-ANTICOAGULATE |           |                 |
|           | D2.0 - 3.0    ALL OTHER  |           |                 |
|           | THERAPEUTIC              |           |                 |
|           | INDICATIONS2.5 - 3.5     |           |                 |
 
|           | MECHANICAL HEART VALVES, |           |                 |
|           |  RECURRENT OR SYSTEMIC   |           |                 |
|           | EMBOLISMTesting          |           |                 |
|           | performed at [facility];[address]     |           |                 |
|           | [address];[place]   |           |                 |
|           | [id number]                    |           |                 |
+-----------+--------------------------+-----------+-----------------+
 
 
 
+-------------------+------------------+--------------------+--------------+
| Performing        | Address          | City/State/Zipcode | Phone Number |
| Organization      |                  |                    |              |
+-------------------+------------------+--------------------+--------------+
|   [facility] |   [address] | [place] [address] |              |
+-------------------+------------------+--------------------+--------------+
 Septic Lactic Acid (2018  8:09 PM)
 
+-------------+-------------------------+------------------+-----------------+
| Component   | Value                   | Ref Range        | Performed At    |
+-------------+-------------------------+------------------+-----------------+
| LACTIC ACID | 1.0Comment: Testing     | 0.4 - 2.0 mmol/L | [facility] |
|             | performed at [facility];[address]    |                  |                 |
|             | [address];[place]  |                  |                 |
|             | [id number]                   |                  |                 |
+-------------+-------------------------+------------------+-----------------+
 
 
 
+-------------------+------------------+--------------------+--------------+
| Performing        | Address          | City/State/Zipcode | Phone Number |
| Organization      |                  |                    |              |
+-------------------+------------------+--------------------+--------------+
|   [facility] |   [address] | [place] [address] |              |
+-------------------+------------------+--------------------+--------------+
 D dimer,quant (2018  8:09 PM)
 
+--------------+--------------------------+----------------------+-----------------+
| Component    | Value                    | Ref Range            | Performed At    |
+--------------+--------------------------+----------------------+-----------------+
| D DIMER,     | 0.59 (H)Comment: D Dimer | 0.19 - 0.50 mg/L FEU | [facility] |
| QUANTITATIVE |  results less than 0.50  |                      |                 |
|              | mg/L FEU may rule out    |                      |                 |
|              | DVT and PE.    However,  |                      |                 |
|              | all laboratory results   |                      |                 |
|              | should be interpreted in |                      |                 |
|              |  the context of all      |                      |                 |
|              | available clinical,      |                      |                 |
|              | radiologic and           |                      |                 |
|              | laboratory               |                      |                 |
|              | information.Testing      |                      |                 |
|              | performed at [facility];[address]     |                      |                 |
|              | [facility];[place], [place]   |                      |                 |
|              | [address]                    |                      |                 |
+--------------+--------------------------+----------------------+-----------------+
 
 
 
+----------+
| Specimen |
 
+----------+
| Blood    |
+----------+
 
 
 
+-------------------+------------------+--------------------+--------------+
| Performing        | Address          | City/State/Zipcode | Phone Number |
| Organization      |                  |                    |              |
+-------------------+------------------+--------------------+--------------+
|   [facility] |   [address] | [place] [address] |              |
+-------------------+------------------+--------------------+--------------+
 PROCALCITONIN (2018  8:09 PM)
 
+---------------+--------------------------+------------+-----------------+
| Component     | Value                    | Ref Range  | Performed At    |
+---------------+--------------------------+------------+-----------------+
| PROCALCITONIN | 0.93 (H)Comment:         | <0.5 ng/mL | [facility] |
|               | INTERPRETIVE             |            |                 |
|               | INFORMATION:    PROCALCI |            |                 |
|               | TONIN PCT <= 0.5         |            |                 |
|               | ng/mL:        Low risk   |            |                 |
|               | for progression to       |            |                 |
|               | severe                   |            |                 |
|               | systemic        bacteria |            |                 |
|               | l infection (severe      |            |                 |
|               | sepsis/septic            |            |                 |
|               | shock).        Does not  |            |                 |
|               | exclude an infection,    |            |                 |
|               | because                  |            |                 |
|               | localized        infecti |            |                 |
|               | ons may be associated    |            |                 |
|               | with such low            |            |                 |
|               | levels.        If PCT is |            |                 |
|               |  measured very early     |            |                 |
|               | after                    |            |                 |
|               | bacterial        challen |            |                 |
|               | ge (usually <6 hours),   |            |                 |
|               | results may still        |            |                 |
|               | be        low and should |            |                 |
|               |  re-assess PCT 6-24      |            |                 |
|               | hours later. PCT >0.5    |            |                 |
|               | and <= 2                 |            |                 |
|               | ng/mL:        Moderate   |            |                 |
|               | risk for progression to  |            |                 |
|               | severe                   |            |                 |
|               | systemic        infectio |            |                 |
|               | n (severe sepsis/septic  |            |                 |
|               | shock).    Other         |            |                 |
|               | conditions are known to  |            |                 |
|               | elevate PCT, patient     |            |                 |
|               |         should be        |            |                 |
|               | closely monitored both   |            |                 |
|               | clinically and           |            |                 |
|               | by        re-assessing   |            |                 |
|               | PCT within 6-24 hours.   |            |                 |
|               | PCT > 2                  |            |                 |
|               | ng/mL:        High       |            |                 |
|               | likelihood for           |            |                 |
|               | progression to severe    |            |                 |
 
|               | systemic        bacteria |            |                 |
|               | l infection (severe      |            |                 |
|               | sepsis/septic shock).    |            |                 |
|               | PCT >= 10                |            |                 |
|               | ng/mL:        High       |            |                 |
|               | likelihood of severe     |            |                 |
|               | sepsis or septic         |            |                 |
|               | shock.Testing performed  |            |                 |
|               | at [facility];[address];[place] [address]   |            |                 |
+---------------+--------------------------+------------+-----------------+
 
 
 
+-------------------+------------------+--------------------+--------------+
| Performing        | Address          | City/State/Zipcode | Phone Number |
| Organization      |                  |                    |              |
+-------------------+------------------+--------------------+--------------+
|   [facility] |   [address] | [place] [address] |              |
+-------------------+------------------+--------------------+--------------+
 Comprehensive metabolic panel (2018  8:09 PM)
 
+----------------+--------------------------+-------------------+-----------------+
| Component      | Value                    | Ref Range         | Performed At    |
+----------------+--------------------------+-------------------+-----------------+
| SODIUM         | 140                      | 135 - 145 mmol/L  | [facility] |
+----------------+--------------------------+-------------------+-----------------+
| POTASSIUM      | 3.7                      | 3.5 - 4.9 mmol/L  | [facility] |
+----------------+--------------------------+-------------------+-----------------+
| CHLORIDE       | 107                      | 99 - 109 mmol/L   | [facility] |
+----------------+--------------------------+-------------------+-----------------+
| CO2            | 26                       | 23 - 32 mmol/L    | [facility] |
+----------------+--------------------------+-------------------+-----------------+
| ANION GAP AGAP | 10                       | 5 - 20 mmol/L     | [facility] |
+----------------+--------------------------+-------------------+-----------------+
| GLUCOSE        | 145 (H)                  | 65 - 99 mg/dL     | [facility] |
+----------------+--------------------------+-------------------+-----------------+
| BUN            | 14                       | 8 - 25 mg/dL      | [facility] |
+----------------+--------------------------+-------------------+-----------------+
| CREATININE     | 0.86                     | 0.70 - 1.30 mg/dL | [facility] |
+----------------+--------------------------+-------------------+-----------------+
| BUN/CREAT      | 16                       |                   | [facility] |
+----------------+--------------------------+-------------------+-----------------+
| CALCIUM        | 8.8                      | 8.5 - 10.5 mg/dL  | [facility] |
+----------------+--------------------------+-------------------+-----------------+
| TOTAL PROTEIN  | 7.3                      | 6.3 - 8.2 g/dL    | [facility] |
+----------------+--------------------------+-------------------+-----------------+
| Albumin        | 3.4                      | 3.3 - 4.8 g/dL    | [facility] |
+----------------+--------------------------+-------------------+-----------------+
| GLOBULIN       | 3.8                      | 1.3 - 4.9 g/dL    | [facility] |
+----------------+--------------------------+-------------------+-----------------+
| A/G            | 0.9 (L)                  | 1.0 - 2.4         | [facility] |
+----------------+--------------------------+-------------------+-----------------+
| TBIL           | 0.4                      | 0.1 - 1.5 mg/dL   | [facility] |
+----------------+--------------------------+-------------------+-----------------+
| ALK PHOS       | 78                       | 35 - 115 U/L      | [facility] |
+----------------+--------------------------+-------------------+-----------------+
| AST            | 15                       | 10 - 45 U/L       | [facility] |
+----------------+--------------------------+-------------------+-----------------+
| ALT            | 27                       | 10 - 65 U/L       | [facility] |
 
+----------------+--------------------------+-------------------+-----------------+
| EGFR           | >60Comment: GFR <60:     | >60 mL/min/1.73m2 | [facility] |
|                | CHRONIC KIDNEY DISEASE,  |                   |                 |
|                | IF FOUND OVER A 3 MONTH  |                   |                 |
|                | PERIOD.GFR <15: KIDNEY   |                   |                 |
|                | FAILURE.FOR       |                   |                 |
|                | [employer]IC[employer], MULTIPLY THE  |                   |                 |
|                | CALCULATED GFR BY        |                   |                 |
|                | 1.210.This eGFR is       |                   |                 |
|                | calculated using the     |                   |                 |
|                | MDRD IDMS traceable      |                   |                 |
|                | equation.Testing         |                   |                 |
|                | performed at [facility]     |                   |                 |
|                | [facility];[place]   |                   |                 |
|                | [id number]                    |                   |                 |
+----------------+--------------------------+-------------------+-----------------+
 
 
 
+-------------------+------------------+--------------------+--------------+
| Performing        | Address          | City/State/Zipcode | Phone Number |
| Organization      |                  |                    |              |
+-------------------+------------------+--------------------+--------------+
|   [facility] |   [address] | [place] [address] |              |
+-------------------+------------------+--------------------+--------------+
 Troponin I (2018  8:09 PM)
 
+------------+--------------------------+-------------------+-----------------+
| Component  | Value                    | Ref Range         | Performed At    |
+------------+--------------------------+-------------------+-----------------+
| TROPONIN I | <0.020Comment:   0.00 to | 0.00 - 0.10 ng/mL | [facility] |
|            |  0.10     CONSISTENT     |                   |                 |
|            | WITH NORMAL              |                   |                 |
|            | POPULATION0.11 to        |                   |                 |
|            | 0.60     CONSISTENT WITH |                   |                 |
|            |  INCREASED RISK FOR      |                   |                 |
|            | ADVERSE OUTCOMES>        |                   |                 |
|            | 0.60                     |                   |                 |
|            | CONSISTENT WITH WHO      |                   |                 |
|            | CRITERIA FOR ACUTE MI    |                   |                 |
|            | Testing performed at     |                   |                 |
|            | [facility];[address]            |                   |                 |
|            | [facility];[place] [address]   |                   |                 |
+------------+--------------------------+-------------------+-----------------+
 
 
 
+-------------------+------------------+--------------------+--------------+
| Performing        | Address          | City/State/Zipcode | Phone Number |
| Organization      |                  |                    |              |
+-------------------+------------------+--------------------+--------------+
|   [facility] |   [address] | [place] [address] |              |
+-------------------+------------------+--------------------+--------------+
 CK MB (2018  8:09 PM)
 
+-------------+------------------------+-----------------+-----------------+
| Component   | Value                  | Ref Range       | Performed At    |
+-------------+------------------------+-----------------+-----------------+
| MMB         | <1.0                   | 0.5 - 3.6 ng/mL | [facility] |
+-------------+------------------------+-----------------+-----------------+
 
| CK-MB Index | UNABLE TO              |                 | [facility] |
|             | CALCULATEComment:      |                 |                 |
|             | Testing performed at   |                 |                 |
|             | [facility];[address] [facility]          |                 |                 |
|             | [address];[place] [id number] |                 |                 |
+-------------+------------------------+-----------------+-----------------+
 
 
 
+-------------------+------------------+--------------------+--------------+
| Performing        | Address          | City/State/Zipcode | Phone Number |
| Organization      |                  |                    |              |
+-------------------+------------------+--------------------+--------------+
|   [facility] |   [address] | [place] [address] |              |
+-------------------+------------------+--------------------+--------------+
 CPK (2018  8:09 PM)
 
+-----------+-------------------------+--------------+-----------------+
| Component | Value                   | Ref Range    | Performed At    |
+-----------+-------------------------+--------------+-----------------+
| CPK       | 51 (L)Comment: Testing  | 55 - 400 U/L | [facility] |
|           | performed at [facility];[address]    |              |                 |
|           | [address];[place]  |              |                 |
|           | [id number]                   |              |                 |
+-----------+-------------------------+--------------+-----------------+
 
 
 
+-------------------+------------------+--------------------+--------------+
| Performing        | Address          | City/State/Zipcode | Phone Number |
| Organization      |                  |                    |              |
+-------------------+------------------+--------------------+--------------+
|   [facility] |   [address] | [place] [address] |              |
+-------------------+------------------+--------------------+--------------+
 POC Arterial Blood Gas (2018  7:41 PM)
 
+-----------------+-------------------------+----------------+-----------------+
| Component       | Value                   | Ref Range      | Performed At    |
+-----------------+-------------------------+----------------+-----------------+
| POC FIO2        | 28                      | %              | [facility] |
+-----------------+-------------------------+----------------+-----------------+
| pH, Art         | 7.402                   | 7.350 - 7.450  | [facility] |
+-----------------+-------------------------+----------------+-----------------+
| POC PCO2        | 40                      | 35 - 45 mmHg   | [facility] |
+-----------------+-------------------------+----------------+-----------------+
| POC p02         | 66 (L)                  | 80 - 105 mmHg  | [facility] |
+-----------------+-------------------------+----------------+-----------------+
| POC HCO3        | 25                      | 22 - 26 mmol/L | [facility] |
+-----------------+-------------------------+----------------+-----------------+
| POC TCO2        | 26                      | 23 - 27 mEq/L  | [facility] |
+-----------------+-------------------------+----------------+-----------------+
| POC BASE EXCESS | 0                       | 0 - 3 mEq/L    | [facility] |
+-----------------+-------------------------+----------------+-----------------+
| POC S02         | 93 (L)Comment: Testing  | 95 - 98 %      | [facility] |
|                 | performed at [facility];[address]    |                |                 |
|                 | [address];[place]  |                |                 |
|                 | [id number]                   |                |                 |
+-----------------+-------------------------+----------------+-----------------+
 
 
 
 
+-------------------+------------------+--------------------+--------------+
| Performing        | Address          | City/State/Zipcode | Phone Number |
| Organization      |                  |                    |              |
+-------------------+------------------+--------------------+--------------+
|   [facility] |   [address] | [place] [address] |              |
+-------------------+------------------+--------------------+--------------+
 XR chest PA and lateral (2018  7:04 PM)
 
+------------------------------------------------------------------------+--------------+
| Impressions                                                            | Performed At |
+------------------------------------------------------------------------+--------------+
|   Bilateral pulmonary vascular congestion. Bibasilar atelectasis.      |   [facility]     |
| Electronically signed by Power Blandon MD on 2018 7:10 PM           | RADIOLOGY    |
+------------------------------------------------------------------------+--------------+
 
 
 
+------------------------------------------------------------------------+--------------+
| Narrative                                                              | Performed At |
+------------------------------------------------------------------------+--------------+
|   [place]  1950  68 years Male  XR CHEST 2 VIEW      |   [facility]     |
| FRONTAL AND LATERAL  2018 7:04 PM     INDICATION: Shortness of    | RADIOLOGY    |
| breath     COMPARISON: 2018     TECHNIQUE: Two view chest, PA |              |
|  and lateral views     FINDINGS:  Bilateral pulmonary vascular         |              |
| congestion. Bibasilar mild atelectasis.     No pneumothorax.     Upper |              |
|  mediastinal contours and heart size are normal.     No acute osseous  |              |
| abnormality.                                                           |              |
+------------------------------------------------------------------------+--------------+
 
 
 
+-----------------------------------------------------------------------+
| Procedure Note                                                        |
+-----------------------------------------------------------------------+
|   Edil, Rad Results In - 2018  7:15 PM PDT  [facility] |
| 1950                                                             |
| 68 years Male                                                         |
| XR CHEST 2 VIEW FRONTAL AND LATERAL                                   |
| 2018 7:04 PM                                                     |
|                                                                       |
| INDICATION: Shortness of breath                                       |
|                                                                       |
| COMPARISON: 2018                                             |
|                                                                       |
| TECHNIQUE: Two view chest, PA and lateral views                       |
|                                                                       |
| FINDINGS:                                                             |
| Bilateral pulmonary vascular congestion. Bibasilar mild atelectasis.  |
|                                                                       |
| No pneumothorax.                                                      |
|                                                                       |
| Upper mediastinal contours and heart size are normal.                 |
|                                                                       |
| No acute osseous abnormality.                                         |
|                                                                       |
| IMPRESSION:                                                           |
| Bilateral pulmonary vascular congestion. Bibasilar atelectasis.       |
|                                                                       |
| Electronically signed by Power Blandon MD on 2018 7:10 PM          |
 
+-----------------------------------------------------------------------+
 
 
 
+--------------------+------------------+--------------------+--------------+
| Performing         | Address          | City/State/Zipcode | Phone Number |
| Organization       |                  |                    |              |
+--------------------+------------------+--------------------+--------------+
|   [facility] |   [address] | [place] [address] |              |
+--------------------+------------------+--------------------+--------------+
 aPTT (2018  6:00 PM)
 
+-----------+-------------------------+-----------------+-----------------+
| Component | Value                   | Ref Range       | Performed At    |
+-----------+-------------------------+-----------------+-----------------+
| APTT      | 29Comment: Testing      | 23 - 32 seconds | [facility] |
|           | performed at [facility];[address]    |                 |                 |
|           | [address];[place]  |                 |                 |
|           | [id number]                   |                 |                 |
+-----------+-------------------------+-----------------+-----------------+
 
 
 
+-------------------+------------------+--------------------+--------------+
| Performing        | Address          | City/State/Zipcode | Phone Number |
| Organization      |                  |                    |              |
+-------------------+------------------+--------------------+--------------+
|   [facility] |   [address] | [place] [address] |              |
+-------------------+------------------+--------------------+--------------+
 Protime-INR (2018  6:00 PM)
 
+-----------+--------------------------+-----------+-----------------+
| Component | Value                    | Ref Range | Performed At    |
+-----------+--------------------------+-----------+-----------------+
| INR       | 1.1Comment: REFERENCE    |           | [facility] |
|           | RANGE:0.9 -              |           |                 |
|           | 1.2    NON-ANTICOAGULATE |           |                 |
|           | D2.0 - 3.0    ALL OTHER  |           |                 |
|           | THERAPEUTIC              |           |                 |
|           | INDICATIONS2.5 - 3.5     |           |                 |
|           | MECHANICAL HEART VALVES, |           |                 |
|           |  RECURRENT OR SYSTEMIC   |           |                 |
|           | EMBOLISMTesting          |           |                 |
|           | performed at [facility];[address]     |           |                 |
|           | [address];[place], [place]   |           |                 |
|           | [id number]                    |           |                 |
+-----------+--------------------------+-----------+-----------------+
 
 
 
+-------------------+------------------+--------------------+--------------+
| Performing        | Address          | City/State/Zipcode | Phone Number |
| Organization      |                  |                    |              |
+-------------------+------------------+--------------------+--------------+
|   [facility] |   [address] | [place] [address] |              |
+-------------------+------------------+--------------------+--------------+
 BNP (2018  5:40 PM)
 
+--------------------+-------------------------+---------------+-----------------+
| Component          | Value                   | Ref Range     | Performed At    |
 
+--------------------+-------------------------+---------------+-----------------+
| BRAIN NATRIURETIC  | 32.9Comment: Testing    | 0 - 100 pg/mL | [facility] |
| PEPTIDE            | performed at [facility];[address]    |               |                 |
|                    | [address];[place]  |               |                 |
|                    | [id number]                   |               |                 |
+--------------------+-------------------------+---------------+-----------------+
 
 
 
+----------+
| Specimen |
+----------+
| Blood    |
+----------+
 
 
 
+-------------------+------------------+--------------------+--------------+
| Performing        | Address          | City/State/Zipcode | Phone Number |
| Organization      |                  |                    |              |
+-------------------+------------------+--------------------+--------------+
|   [facility] |   [address] | [facility] [place] [address] |              |
+-------------------+------------------+--------------------+--------------+
 Cardiac Panel (2018  5:40 PM)
 
+-----------------+--------------------------+-------------------+-----------------+
| Component       | Value                    | Ref Range         | Performed At    |
+-----------------+--------------------------+-------------------+-----------------+
| WBC             | 12.67 (H)                | 3.80 - 11.00 K/uL | [facility] |
+-----------------+--------------------------+-------------------+-----------------+
| RBC             | 4.21                     | 4.20 - 5.70 M/uL  | [facility] |
+-----------------+--------------------------+-------------------+-----------------+
| HGB             | 13.5                     | 13.2 - 17.0 g/dL  | [facility] |
+-----------------+--------------------------+-------------------+-----------------+
| HCT             | 40.7                     | 39.0 - 50.0 %     | [facility] |
+-----------------+--------------------------+-------------------+-----------------+
| MCV             | 96.6                     | 80.0 - 100.0 fl   | [facility] |
+-----------------+--------------------------+-------------------+-----------------+
| MCH             | 32.0                     | 27.0 - 34.0 pg    | [facility] |
+-----------------+--------------------------+-------------------+-----------------+
| MCHC            | 33.1                     | 32.0 - 35.5 g/dL  | [facility] |
+-----------------+--------------------------+-------------------+-----------------+
| RDW SD          | 48.6                     | 37 - 53 fl        | [facility] |
+-----------------+--------------------------+-------------------+-----------------+
| PLT             | 194                      | 150 - 400 K/uL    | [facility] |
+-----------------+--------------------------+-------------------+-----------------+
| MPV             | 9.4                      | fl                | [facility] |
+-----------------+--------------------------+-------------------+-----------------+
| DIFF TYPE       | AUTOMATED                |                   | [facility] |
+-----------------+--------------------------+-------------------+-----------------+
| NEUTROPHILS     | 78.22                    | %                 | [facility] |
+-----------------+--------------------------+-------------------+-----------------+
| LYMPHOCYTES     | 11.30                    | %                 | [facility] |
+-----------------+--------------------------+-------------------+-----------------+
| MONOCYTES       | 8.09                     | %                 | [facility] |
+-----------------+--------------------------+-------------------+-----------------+
| EOSINOPHILS     | 1.72                     | %                 | [facility] |
+-----------------+--------------------------+-------------------+-----------------+
| BASOPHILS       | 0.67                     | %                 | [facility] |
+-----------------+--------------------------+-------------------+-----------------+
 
| NEUTROPHILS ABS | 9.91 (H)                 | 1.90 - 7.40 K/uL  | [facility] |
+-----------------+--------------------------+-------------------+-----------------+
| LYMPHOCYTES ABS | 1.43                     | 1.00 - 3.90 K/uL  | [facility] |
+-----------------+--------------------------+-------------------+-----------------+
| MONOCYTES ABS   | 1.03 (H)                 | 0.00 - 0.80 K/uL  | [facility] |
+-----------------+--------------------------+-------------------+-----------------+
| EOSINOPHILS ABS | 0.22                     | 0.00 - 0.50 K/uL  | [facility] |
+-----------------+--------------------------+-------------------+-----------------+
| BASOPHILS ABS   | 0.09Comment: Testing     | 0.00 - 0.10 K/uL  | [facility] |
|                 | performed at [facility];[address]     |                   |                 |
|                 | [address];[place], [place]   |                   |                 |
|                 | [id number]                    |                   |                 |
+-----------------+--------------------------+-------------------+-----------------+
| SODIUM          | SPECIMEN HEMOLYZED, WILL | 135 - 145 mmol/L  | [facility] |
|                 |  BE REDRAWN RN TO SEND   |                   |                 |
+-----------------+--------------------------+-------------------+-----------------+
| POTASSIUM       | SPECIMEN HEMOLYZED, WILL | 3.5 - 4.9 mmol/L  | [facility] |
|                 |  BE REDRAWN RN TO SEND   |                   |                 |
+-----------------+--------------------------+-------------------+-----------------+
| CHLORIDE        | SPECIMEN HEMOLYZED, WILL | 99 - 109 mmol/L   | [facility] |
|                 |  BE REDRAWN RN TO SEND   |                   |                 |
+-----------------+--------------------------+-------------------+-----------------+
| CO2             | SPECIMEN HEMOLYZED, WILL | 23 - 32 mmol/L    | [facility] |
|                 |  BE REDRAWN RN TO SEND   |                   |                 |
+-----------------+--------------------------+-------------------+-----------------+
| ANION GAP AGAP  | SPECIMEN HEMOLYZED, WILL | 5 - 20 mmol/L     | [facility] |
|                 |  BE REDRAWN RN TO SEND   |                   |                 |
+-----------------+--------------------------+-------------------+-----------------+
| GLUCOSE         | SPECIMEN HEMOLYZED, WILL | 65 - 99 mg/dL     | [facility] |
|                 |  BE REDRAWN RN TO SEND   |                   |                 |
+-----------------+--------------------------+-------------------+-----------------+
| BUN             | SPECIMEN HEMOLYZED, WILL | 8 - 25 mg/dL      | [facility] |
|                 |  BE REDRAWN RN TO SEND   |                   |                 |
+-----------------+--------------------------+-------------------+-----------------+
| CREATININE      | SPECIMEN HEMOLYZED, WILL | 0.70 - 1.30 mg/dL | [facility] |
|                 |  BE REDRAWN RN TO SEND   |                   |                 |
+-----------------+--------------------------+-------------------+-----------------+
| BUN/CREAT       | SPECIMEN HEMOLYZED, WILL |                   | [facility] |
|                 |  BE REDRAWN RN TO SEND   |                   |                 |
+-----------------+--------------------------+-------------------+-----------------+
| CALCIUM         | SPECIMEN HEMOLYZED, WILL | 8.5 - 10.5 mg/dL  | [facility] |
|                 |  BE REDRAWN RN TO SEND   |                   |                 |
+-----------------+--------------------------+-------------------+-----------------+
| TOTAL PROTEIN   | SPECIMEN HEMOLYZED, WILL | 6.3 - 8.2 g/dL    | [facility] |
|                 |  BE REDRAWN RN TO SEND   |                   |                 |
+-----------------+--------------------------+-------------------+-----------------+
| Albumin         | SPECIMEN HEMOLYZED, WILL | 3.3 - 4.8 g/dL    | [facility] |
|                 |  BE REDRAWN RN TO SEND   |                   |                 |
+-----------------+--------------------------+-------------------+-----------------+
| GLOBULIN        | SPECIMEN HEMOLYZED, WILL | 1.3 - 4.9 g/dL    | [facility] |
|                 |  BE REDRAWN RN TO SEND   |                   |                 |
+-----------------+--------------------------+-------------------+-----------------+
| A/G             | SPECIMEN HEMOLYZED, WILL | 1.0 - 2.4         | [facility] [facility] |
|                 |  BE REDRAWN RN TO SEND   |                   |                 |
+-----------------+--------------------------+-------------------+-----------------+
| TBIL            | SPECIMEN HEMOLYZED, WILL | 0.1 - 1.5 mg/dL   | [facility] LABORATORY |
|                 |  BE REDRAWN RN TO SEND   |                   |                 |
+-----------------+--------------------------+-------------------+-----------------+
| ALK PHOS        | SPECIMEN HEMOLYZED, WILL | 35 - 115 U/L      | [facility]AT[facility] |
|                 |  BE REDRAWN RN TO SEND   |                   |                 |
 
+-----------------+--------------------------+-------------------+-----------------+
| AST             | SPECIMEN HEMOLYZED, WILL | 10 - 45 U/L       | [facility] LABORATORY |
|                 |  BE REDRAWN RN TO SEND   |                   |                 |
+-----------------+--------------------------+-------------------+-----------------+
| ALT             | SPECIMEN HEMOLYZED, WILL | 10 - 65 U/L       | [facility] LABORATORY |
|                 |  BE REDRAWN RN TO SEND   |                   |                 |
+-----------------+--------------------------+-------------------+-----------------+
| EGFR            | SPECIMEN HEMOLYZED, WILL | >60 mL/min/1.73m2 | [facility] LABORATORY |
|                 |  BE REDRAWN RN TO SEND   |                   |                 |
+-----------------+--------------------------+-------------------+-----------------+
| CPK             | SPECIMEN HEMOLYZED, WILL | 55 - 400 U/L      | [facility] LABORATORY |
|                 |  BE REDRAWN RN TO SEND   |                   |                 |
+-----------------+--------------------------+-------------------+-----------------+
| PROTIME         | SPECIMEN HEMOLYZED, WILL | seconds           | [facility] LABORATORY |
|                 |  BE REDRAWN RN TO SEND   |                   |                 |
+-----------------+--------------------------+-------------------+-----------------+
| INR             | SPECIMEN HEMOLYZED, WILL |                   | [facility] LABORATORY |
|                 |  BE REDRAWN RN TO SEND   |                   |                 |
+-----------------+--------------------------+-------------------+-----------------+
| APTT            | SPECIMEN HEMOLYZED, WILL | 23 - 32 seconds   | [facility] LABORATORY |
|                 |  BE REDRAWN RN TO SEND   |                   |                 |
+-----------------+--------------------------+-------------------+-----------------+
| MMB             | SPECIMEN HEMOLYZED, WILL | 0.5 - 3.6 ng/mL   | [facility] LABORATORY |
|                 |  BE REDRAWN RN TO SEND   |                   |                 |
+-----------------+--------------------------+-------------------+-----------------+
| CK-MB Index     | SPECIMEN HEMOLYZED, WILL |                   | [facility] LABORATORY |
|                 |  BE REDRAWN MCIH TO SEND   |                   |                 |
+-----------------+--------------------------+-------------------+-----------------+
 
 
 
+-------------------+------------------+--------------------+--------------+
| Performing        | Address          | City/State/Zipcode | Phone Number |
| Organization      |                  |                    |              |
+-------------------+------------------+--------------------+--------------+
|   [facility] LABORATORY |   [address] | [place] [address] |              |
+-------------------+------------------+--------------------+--------------+
 EKG 12 LEAD UNIT PERFORMED (2018  5:31 PM)
 
+-------------------+-------------------------+-----------+--------------+
| Component         | Value                   | Ref Range | Performed At |
+-------------------+-------------------------+-----------+--------------+
| Ventricular Rate  | 74                      | BPM       | [facility]     |
+-------------------+-------------------------+-----------+--------------+
| Atrial Rate       | 74                      | BPM       | [facility]     |
+-------------------+-------------------------+-----------+--------------+
| P-R Interval      | 158                     | ms        | [facility]     |
+-------------------+-------------------------+-----------+--------------+
| QRS Duration      | 88                      | ms        | [facility]     |
+-------------------+-------------------------+-----------+--------------+
| Q-T Interval      | 378                     | ms        | [facility]     |
+-------------------+-------------------------+-----------+--------------+
| QTC Calculation   | 419                     | ms        | [facility]     |
| (Bezet)           |                         |           |              |
+-------------------+-------------------------+-----------+--------------+
| Calculated P Axis | 16                      | degrees   | [facility]     |
+-------------------+-------------------------+-----------+--------------+
| Calculated R Axis | 17                      | degrees   | [facility]     |
+-------------------+-------------------------+-----------+--------------+
| Calculated T Axis | 18                      | degrees   | [facility]     |
 
+-------------------+-------------------------+-----------+--------------+
| Diagnosis         | Normal sinus            |           | [facility]     |
|                   | rhythmNormal ECGWhen    |           |              |
|                   | compared with ECG of    |           |              |
|                   | 2018 16:47,No    |           |              |
|                   | significant change was  |           |              |
|                   | foundThis ECG contains  |           |              |
|                   | Unconfirmed             |           |              |
|                   | Interpretation          |           |              |
|                   | Statements.    See ED   |           |              |
|                   | Record for Physician    |           |              |
|                   | Interpretation.         |           |              |
|                   | Confirmed by MUSE READ  |           |              |
|                   | ONLY, -COMPUTER (145),  |           |              |
|                   |  Meryl Kern  |           |              |
|                   | (79) on 2018       |           |              |
|                   | 3:54:34 AM              |           |              |
+-------------------+-------------------------+-----------+--------------+
 
 
 
+--------------+-------------------+--------------------+--------------+
| Performing   | Address           | City/State/Zipcode | Phone Number |
| Organization |                   |                    |              |
+--------------+-------------------+--------------------+--------------+
|   [facility]   |   [address] | [place] [address] |              |
+--------------+-------------------+--------------------+--------------+
 ED INFORMATION EXCHANGE (2018  3:51 PM)
 
+------------------------------------------------------------------------+--------------+
| Narrative                                                              | Performed At |
+------------------------------------------------------------------------+--------------+
|   [id number] [id number]     This patient has registered at the   |   ED         |
| [facility] Emergency Department   For more         | INFORMATION  |
| information visit:                                                     | EXCHANGE     |
| [email or web address] |              |
| [phone]   Security Events  No recent Security Events currently on file  |              |
|     ED Care Guidelines from [facility]  Last Updated: 18 |              |
|  9:04 AM         Additional Information:  Currently placed at [facility] |              |
|  [facility], please contact Gaby  |              |
| Osgood [phone].Prescription medication goes through [facility], |              |
|  [phone].  These are guidelines and the provider should exercise  |              |
| clinical judgment when providing care.     Recent Emergency Department |              |
|  Visit Summary  Admit Date Facility City State Type Major Type         |              |
| Diagnoses or Chief Complaint   [facility]
| [facility]    Emergency          Weakness        Shortness of |              |
|  Breath      [facility]               |              |
| Emergency    Emergency          Shortness of Breath        Weakness    |              |
|      Lymphedema, not elsewhere classified            E.D. Visit Count  |              |
| (12 mo.)  Facility Visits Low Acuity   [facility]  |              |
| 3 0   Total 3 0   Note: Visits indicate total known visits. Medicaid   |              |
| Low Acuity Dx are the number of primary diagnoses on the Medicaid's    |              |
| Low Acuity dx list.         Recent Inpatient Visit Summary  No         |              |
| recorded inpatient visits.      PDMP Report  PDMP query found no       |              |
| report.     Care Providers  Provider PRC Type Phone Fax Service Dates  |              |
|   [facility] Provider [phone]                   |              |
| [phone]    Current         Known Aliases  No known aliases.           |              |
| Criteria met         Care Guidelines     The above information is      |              |
| provided for the sole purpose of patient treatment. Use of this        |              |
| information beyond the terms of Data Sharing Memorandum of             |              |
 
| Understanding and License Agreement is prohibited. In certain cases    |              |
| not all visits may be represented.   Consult the aforementioned        |              |
| facilities for additional information.   2018 [employer]       |              |
| [employer] - [place], [place] -                              |              |
| [email or web address]                                         |              |
+------------------------------------------------------------------------+--------------+
 
 
 
+-------------------------------------------------------------------------------------------
--------------------------------------------------------------------------------------------
--------------------+
| Procedure Note                                                                            
                                                                                            
                    |
+-------------------------------------------------------------------------------------------
--------------------------------------------------------------------------------------------
--------------------+
|   Carlos, Lab - 2018  3:52 PM PDT  Formatting of this note may be different      
                                                                                            
                    |
| from the original.EDIE15:[id number] [id number] patient has registered at the [facility]    
                                                                                            
                    |
| [facility] Emergency Department For more information visit:                  
                                                                                            
                    |
| [email or web address] Security     
                                                                                            
                    |
| EventsNo recent Security Events currently on fileED Care Guidelines from [employer] -       
                                                                                            
                    |
| Jeovany Updated: 18 9:04 AM  Additional Information:Currently placed at         
                                                                                            
                    |
| [facility], please contact Candace Osgood      
                                                                                            
                    |
| [phone].Prescription medication goes through [facility], [phone].These are    
                                                                                            
                    |
| guidelines and the provider should exercise clinical judgment when providing care.Recent  
                                                                                            
                    |
|  Emergency Department Visit SummaryAdmit Date Facility City State Type Major Type         
                                                                                            
                    |
| Diagnoses or Chief Complaint 2018 [facility] [place] Emergency        
                                                                                            
                    |
| Emergency    Weakness    Shortness of Breath  2018 [facility] [place]    
                                                                                            
                    |
| [place] Emergency  Emergency    Shortness of Breath    Weakness    Lymphedema, not elsewhere   
                                                                                            
                    |
| classified  E.D. Visit Count (12 mo.)Facility Visits Low Acuity [facility]   
                                                                                            
                    |
 
| Center 3 0 Total 3 0 Note: Visits indicate total known visits. Medicaid Low Acuity Dx     
                                                                                            
                    |
| are the number of primary diagnoses on the Medicaid's Low Acuity dx list.  Recent         
                                                                                            
                    |
| Inpatient Visit SummaryNo recorded inpatient visits. PDMP ReportPDMP query found no       
                                                                                            
                    |
| report.Care ProvidersProvider PRC Type Phone Fax Service Dates [facility]      
                                                                                            
                    |
| Mental Health Provider [phone]  Current  Known AliasesNo known aliases. Criteria   
                                                                                            
                    |
| met  Care GuidelinesThe above information is provided for the sole purpose of patient     
                                                                                            
                    |
| treatment. Use of this information beyond the terms of Data Sharing Memorandum of         
                                                                                            
                    |
| Understanding and License Agreement is prohibited. In certain cases not all visits may    
                                                                                            
                    |
| be represented. Consult the aforementioned facilities for additional information. 2018    
                                                                                            
                    |
| "[employer]" - [place], [place] -                              
                                                                                            
                    |
| [email or web address]                                                            
                                                                                            
                    |
|                                                                                           
                                                                                            
                    |
|                                                                                           
                                                                                            
                    |
|                                                                                           
                                                                                            
                    |
|E.D. Visit Count (12 mo.)                                                                  
                                                                                            
                   |
|Facility Visits Low Acuity                                                                 
                                                                                            
                    |
|[facility] 3 0                                                         
                                                                                            
                    |
|Total 3 0                                                                                  
                                                                                            
                    |
|Note: Visits indicate total known visits. Medicaid Low Acuity Dx are the number of primary 
diagnoses on the Medicaid's Low Acuity dx list.                                             
                    |
|                                                                                           
                                                                                            
                    |
 
|Recent Inpatient Visit Summary                                                             
                                                                                            
                    |
|No recorded inpatient visits.                                                              
                                                                                            
                    |
|                                                                                           
                                                                                            
                    |
|PDMP Report                                                                                
                                                                                            
                    |
|PDMP query found no report.                                                                
                                                                                            
                    |
|                                                                                           
                                                                                            
                    |
|Care Providers                                                                             
                                                                                            
                    |
|Provider PRC Type Phone Fax Service Dates                                                  
                                                                                            
                    |
|[facility] Provider [phone]  Current                       
                                                                                            
                    |
|                                                                                           
                                                                                            
                    |
|Known Aliases                                                                              
                                                                                            
                    |
|No known aliases.                                                                          
                                                                                            
                    |
|Criteria met                                                                               
                                                                                            
                    |
|                                                                                           
                                                                                            
                    |
|  Care Guidelines                                                                          
                                                                                            
                    |
|                                                                                           
                                                                                            
                    |
|The above information is provided for the sole purpose of patient treatment. Use of this in
formation beyond the terms of Data Sharing Memorandum of Understanding and License Agreement
 is prohibited. In  |
|certain cases not all visits may be represented. Consult the aforementioned facilities for 
additional information.                                                                     
                    |
|2018 "[employer] - [place], [place] - [email or web address]                                                                                       
                    |
+-------------------------------------------------------------------------------------------
--------------------------------------------------------------------------------------------
--------------------+
 
 
 
 
+-------------------+---------+--------------------+--------------+
| Performing        | Address | City/State/Zipcode | Phone Number |
| Organization      |         |                    |              |
+-------------------+---------+--------------------+--------------+
|   ED INFORMATION  |         |                    |              |
| EXCHANGE          |         |                    |              |
+-------------------+---------+--------------------+--------------+
 in this encounter
 
 Visit Diagnoses
 
 
+--------------------------------------------------------+
| Diagnosis                                              |
+--------------------------------------------------------+
| Hypoxemia - Primary                                    |
+--------------------------------------------------------+
| COPD with acute exacerbation (HCC)                     |
+--------------------------------------------------------+
|   Obstructive chronic bronchitis with exacerbation     |
+--------------------------------------------------------+
| Dyspnea on exertion                                    |
+--------------------------------------------------------+
|   Other dyspnea and respiratory abnormality            |
+--------------------------------------------------------+
| Leg swelling                                           |
+--------------------------------------------------------+
|   Swelling of limb                                     |
+--------------------------------------------------------+
| BMI 31.0-31.9,adult                                    |
+--------------------------------------------------------+
|   Body Mass Index 31.0-31.9, adult                     |
+--------------------------------------------------------+
| Schizoaffective disorder (HCC)                         |
+--------------------------------------------------------+
|   Schizoaffective disorder, unspecified condition      |
+--------------------------------------------------------+
| COPD (chronic obstructive pulmonary disease) (Formerly [facility])     |
+--------------------------------------------------------+
|   Chronic airway obstruction, not elsewhere classified |
+--------------------------------------------------------+
 
 
 
 Admitting Diagnoses
 
 
+----------------------------------------------------+
| Diagnosis                                          |
+----------------------------------------------------+
| Hypoxemia                                          |
+----------------------------------------------------+
| Dyspnea on exertion                                |
+----------------------------------------------------+
|   Other dyspnea and respiratory abnormality        |
+----------------------------------------------------+
| Leg swelling                                       |
 
+----------------------------------------------------+
|   Swelling of limb                                 |
+----------------------------------------------------+
| COPD with acute exacerbation (HCC)                 |
+----------------------------------------------------+
|   Obstructive chronic bronchitis with exacerbation |
+----------------------------------------------------+
 
 
 
 Administered Medications
 
 
+------------------+--------+---------+------+------+------+
| Medication Order | MAR    | Action  | Dose | Rate | Site |
|                  | Action | Date    |      |      |      |
+------------------+--------+---------+------+------+------+
 
 
 
+-----------------------------------+---+
|   acetaminophen (TYLENOL)         |   |
| suppository 650 mg  650 mg,       |   |
| Rectal, Every 6 Hours PRN, Mild   |   |
| Pain (1-3), Fever, Starting Fri   |   |
| 18 at 1959                   |   |
+-----------------------------------+---+
|                                   |   |
+-----------------------------------+---+
|   acetaminophen (TYLENOL) tablet  |   |
| 650 mg  650 mg, Oral, Every 6     |   |
| Hours PRN, Mild Pain (1-3),       |   |
| Fever, Starting 18 at    |   |
| 1959                              |   |
+-----------------------------------+---+
|                                   |   |
+-----------------------------------+---+
 
 
 
+-----------------------------------+-------+----------+-------+---+---+
|   atorvastatin (LIPITOR) tablet   | Given |  | 20 mg |   |   |
| 20 mg  20 mg, Oral, Nightly,      |       | 8 22:27  |       |   |   |
| First dose on 18 at 2200 |       | PDT      |       |   |   |
+-----------------------------------+-------+----------+-------+---+---+
 
 
 
+-------+----------+-------+---+---+
| Given |  | 20 mg |   |   |
|       | 8 22:03  |       |   |   |
|       | PDT      |       |   |   |
+-------+----------+-------+---+---+
| Given |  | 20 mg |   |   |
|       | 8 20:42  |       |   |   |
|       | PDT      |       |   |   |
+-------+----------+-------+---+---+
 
 
 
 
+---+---+
|   |   |
+---+---+
 
 
 
+-----------------------------------+---------+----------+--------+-------+---+
|   azithromycin (ZITHROMAX) IVPB   | New Bag |  | 500 mg | 250   |   |
| 500 mg  500 mg, Intravenous,      |         | 8 19:39  |        | mL/hr |   |
| Administer over 60 Minutes, Once, |         | PDT      |        |       |   |
|  Fri 18 at 1911, For 1 dose  |         |          |        |       |   |
+-----------------------------------+---------+----------+--------+-------+---+
 
 
 
+---+---+
|   |   |
+---+---+
 
 
 
+-----------------------------------+-------+----------+--------+---+---+
|   budesonide (PULMICORT)          | Given |  | 0.5 mg |   |   |
| nebulizer suspension 0.5 mg  0.5  |       | 8 06:55  |        |   |   |
| mg, Nebulization, 2 Times Daily,  |       | PDT      |        |   |   |
| First dose on 18 at 2100 |       |          |        |   |   |
+-----------------------------------+-------+----------+--------+---+---+
 
 
 
+-------+----------+--------+---+---+
| Given |  | 0.5 mg |   |   |
|       | 8 20:21  |        |   |   |
|       | PDT      |        |   |   |
+-------+----------+--------+---+---+
| Given |  | 0.5 mg |   |   |
|       | 8 07:00  |        |   |   |
|       | PDT      |        |   |   |
+-------+----------+--------+---+---+
 
 
 
+---+---+
|   |   |
+---+---+
 
 
 
+-----------------------------------+-------+----------+--------+---+---+
|   buPROPion (WELLBUTRIN XL) 24 hr | Given |  | 300 mg |   |   |
|  tablet 300 mg  300 mg, Oral,     |       | 8 10:35  |        |   |   |
| Every Morning, First dose on Sat  |       | PDT      |        |   |   |
| 18 at 0900                   |       |          |        |   |   |
+-----------------------------------+-------+----------+--------+---+---+
 
 
 
+-------+----------+--------+---+---+
| Given |  | 300 mg |   |   |
|       | 8 09:48  |        |   |   |
 
|       | PDT      |        |   |   |
+-------+----------+--------+---+---+
| Given |  | 300 mg |   |   |
|       | 8 09:37  |        |   |   |
|       | PDT      |        |   |   |
+-------+----------+--------+---+---+
 
 
 
+---+---+
|   |   |
+---+---+
 
 
 
+-----------------------------------+-------+----------+-------+---+---+
|   enoxaparin (LOVENOX) injection  | Given |  | 40 mg |   |   |
| 40 mg  40 mg, Subcutaneous, Every |       | 8 22:26  |       |   |   |
|  24 Hours, First dose on Fri      |       | PDT      |       |   |   |
| 18 at 2030                   |       |          |       |   |   |
+-----------------------------------+-------+----------+-------+---+---+
 
 
 
+-------+----------+-------+---+---+
| Given |  | 40 mg |   |   |
|       | 8 22:03  |       |   |   |
|       | PDT      |       |   |   |
+-------+----------+-------+---+---+
| Given |  | 40 mg |   |   |
|       | 8 20:42  |       |   |   |
|       | PDT      |       |   |   |
+-------+----------+-------+---+---+
 
 
 
+-----------------------------------+---+
|                                   |   |
+-----------------------------------+---+
|   famotidine (PEPCID) IVPB 20 mg  |   |
|  20 mg, Intravenous, Administer   |   |
| over 30 Minutes, 2 Times Daily,   |   |
| First dose on 18 at 2100 |   |
+-----------------------------------+---+
|                                   |   |
+-----------------------------------+---+
 
 
 
+-----------------------------------+-------+----------+-------+---+---+
|   famotidine (PEPCID) tablet 20   | Given |  | 20 mg |   |   |
| mg  20 mg, Oral, 2 Times Daily,   |       | 8 09:49  |       |   |   |
| First dose on 18 at 2100 |       | PDT      |       |   |   |
+-----------------------------------+-------+----------+-------+---+---+
 
 
 
+-------+----------+-------+---+---+
| Given |  | 20 mg |   |   |
|       | 8 20:42  |       |   |   |
 
|       | PDT      |       |   |   |
+-------+----------+-------+---+---+
| Given |  | 20 mg |   |   |
|       | 8 09:36  |       |   |   |
|       | PDT      |       |   |   |
+-------+----------+-------+---+---+
 
 
 
+---+---+
|   |   |
+---+---+
 
 
 
+-----------------------------------+-------+----------+-------+---+---+
|   furosemide (LASIX) tablet 20 mg | Given |  | 20 mg |   |   |
|   20 mg, Oral, Daily, First dose  |       | 8 10:30  |       |   |   |
| on Sat 7/28/18 at 0900            |       | PDT      |       |   |   |
+-----------------------------------+-------+----------+-------+---+---+
 
 
 
+-------+----------+-------+---+---+
| Given |  | 20 mg |   |   |
|       | 8 09:49  |       |   |   |
|       | PDT      |       |   |   |
+-------+----------+-------+---+---+
| Given |  | 20 mg |   |   |
|       | 8 09:36  |       |   |   |
|       | PDT      |       |   |   |
+-------+----------+-------+---+---+
 
 
 
+---+---+
|   |   |
+---+---+
 
 
 
+-----------------------------------+-------+----------+---------+---+---+
|   iopamidol (ISOVUE-370) 76 %     | Given |  | 100 mLs |   |   |
| injection 100 mL  100 mL,         |       | 8 23:23  |         |   |   |
| Intravenous, Img Once PRN, Other, |       | PDT      |         |   |   |
|  Starting Fri 18 at 2313,    |       |          |         |   |   |
| For 1 dose                        |       |          |         |   |   |
+-----------------------------------+-------+----------+---------+---+---+
 
 
 
+---+---+
|   |   |
+---+---+
 
 
 
+-----------------------------------+-------+----------+-------+---+---+
|   ipratropium-albuterol (DUO-NEB) | Given |  | 3 mLs |   |   |
|  0.5-2.5 mg/3mL nebulizer         |       | 8 17:48  |       |   |   |
 
| solution 3 mL  3 mL,              |       | PDT      |       |   |   |
| Nebulization, Once RT, Fri        |       |          |       |   |   |
| 18 at 1724, For 1 dose       |       |          |       |   |   |
+-----------------------------------+-------+----------+-------+---+---+
 
 
 
+---+---+
|   |   |
+---+---+
 
 
 
+-----------------------------------+-------+----------+-------+---+---+
|   ipratropium-albuterol (DUO-NEB) | Given |  | 3 mLs |   |   |
|  0.5-2.5 mg/3mL nebulizer         |       | 8 23:20  |       |   |   |
| solution 3 mL  3 mL,              |       | PDT      |       |   |   |
| Nebulization, Every 4 Hours While |       |          |       |   |   |
|  Awake, First dose on 18 |       |          |       |   |   |
|  at 2200                          |       |          |       |   |   |
+-----------------------------------+-------+----------+-------+---+---+
 
 
 
+-------+----------+-------+---+---+
| Given |  | 3 mLs |   |   |
|       | 8 07:00  |       |   |   |
|       | PDT      |       |   |   |
+-------+----------+-------+---+---+
| Given |  | 3 mLs |   |   |
|       | 8 11:00  |       |   |   |
|       | PDT      |       |   |   |
+-------+----------+-------+---+---+
 
 
 
+---+---+
|   |   |
+---+---+
 
 
 
+-----------------------------------+-------+----------+--------+-------+---+
|   levofloxacin (LEVAQUIN) IVPB    | Given |  | 500 mg | 100   |   |
| 500 mg  500 mg, Intravenous,      |       | 8 00:30  |        | mL/hr |   |
| Administer over 60 Minutes, Every |       | PDT      |        |       |   |
|  24 Hours, First dose on Sat      |       |          |        |       |   |
| 18 at 0000                   |       |          |        |       |   |
+-----------------------------------+-------+----------+--------+-------+---+
 
 
 
+-------+----------+--------+-------+---+
| Given |  | 500 mg | 100   |   |
|       | 8 01:30  |        | mL/hr |   |
|       | PDT      |        |       |   |
+-------+----------+--------+-------+---+
| Given |  | 500 mg | 100   |   |
|       | 8 23:52  |        | mL/hr |   |
|       | PDT      |        |       |   |
 
+-------+----------+--------+-------+---+
 
 
 
+---+---+
|   |   |
+---+---+
 
 
 
+----------------------------------+-------+----------+-------+---+---+
|   lurasidone (LATUDA) tablet 40  | Given |  | 40 mg |   |   |
| mg  40 mg, Oral, Daily, First    |       | 8 22:26  |       |   |   |
| dose on 18 at 2100      |       | PDT      |       |   |   |
+----------------------------------+-------+----------+-------+---+---+
 
 
 
+-------+----------+-------+---+---+
| Given |  | 40 mg |   |   |
|       | 8 22:03  |       |   |   |
|       | PDT      |       |   |   |
+-------+----------+-------+---+---+
| Given |  | 40 mg |   |   |
|       | 8 20:42  |       |   |   |
|       | PDT      |       |   |   |
+-------+----------+-------+---+---+
 
 
 
+---+---+
|   |   |
+---+---+
 
 
 
+----------------------------------+-------+----------+--------+---+---+
|   methylPREDNISolone             | Given |  | 125 mg |   |   |
| (Solu-MEDROL) injection 125 mg   |       | 8 18:02  |        |   |   |
| 125 mg, Intravenous, Once, Fri   |       | PDT      |        |   |   |
| 18 at 1724, For 1 dose      |       |          |        |   |   |
+----------------------------------+-------+----------+--------+---+---+
 
 
 
+---+---+
|   |   |
+---+---+
 
 
 
+-----------------------------------+-------+----------+-------+---+---+
|   OLANZapine (ZyPREXA) tablet 10  | Given |  | 10 mg |   |   |
| mg  10 mg, Oral, 2 Times Daily,   |       | 8 09:49  |       |   |   |
| First dose on 18 at 2100 |       | PDT      |       |   |   |
+-----------------------------------+-------+----------+-------+---+---+
 
 
 
+-------+----------+-------+---+---+
 
| Given |  | 10 mg |   |   |
|       | 8 20:43  |       |   |   |
|       | PDT      |       |   |   |
+-------+----------+-------+---+---+
| Given |  | 10 mg |   |   |
|       | 8 09:37  |       |   |   |
|       | PDT      |       |   |   |
+-------+----------+-------+---+---+
 
 
 
+---+---+
|   |   |
+---+---+
 
 
 
+-----------------------------------+-------+----------+-------+---+---+
|   pantoprazole (PROTONIX) EC      | Given |  | 40 mg |   |   |
| tablet 40 mg  40 mg, Oral, Every  |       | 8 05:33  |       |   |   |
| Morning Before Breakfast, First   |       | PDT      |       |   |   |
| dose on Sat 18 at 0630       |       |          |       |   |   |
+-----------------------------------+-------+----------+-------+---+---+
 
 
 
+-------+----------+-------+---+---+
| Given |  | 40 mg |   |   |
|       | 8 05:45  |       |   |   |
|       | PDT      |       |   |   |
+-------+----------+-------+---+---+
| Given |  | 40 mg |   |   |
|       | 8 08:00  |       |   |   |
|       | PDT      |       |   |   |
+-------+----------+-------+---+---+
 
 
 
+---+---+
|   |   |
+---+---+
 
 
 
+-----------------------------------+-------+----------+--------+---+---+
|   potassium chloride (K-DUR) CR   | Given |  | 40 mEq |   |   |
| tablet 40 mEq  40 mEq, Oral,      |       | 8 09:36  |        |   |   |
| Once, Mon 18 at 0800, For 1  |       | PDT      |        |   |   |
| dose                              |       |          |        |   |   |
+-----------------------------------+-------+----------+--------+---+---+
 
 
 
+---+---+
|   |   |
+---+---+
 
 
 
+----------------------------------+-------+----------+-------+---+---+
 
|   predniSONE (DELTASONE) tablet  | Given |  | 50 mg |   |   |
| 50 mg  50 mg, Oral, Daily With   |       | 8 10:29  |       |   |   |
| Breakfast, First dose on Sat     |       | PDT      |       |   |   |
| 18 at 0800                  |       |          |       |   |   |
+----------------------------------+-------+----------+-------+---+---+
 
 
 
+-------+----------+-------+---+---+
| Given |  | 50 mg |   |   |
|       | 8 09:49  |       |   |   |
|       | PDT      |       |   |   |
+-------+----------+-------+---+---+
| Given |  | 50 mg |   |   |
|       | 8 09:36  |       |   |   |
|       | PDT      |       |   |   |
+-------+----------+-------+---+---+
 
 
 
+---+---+
|   |   |
+---+---+
 
 
 
+-----------------------------------+-------+----------+-------+---+---+
|   propranolol (INDERAL) tablet 20 | Given |  | 20 mg |   |   |
|  mg  20 mg, Oral, 2 Times Daily,  |       | 8 09:48  |       |   |   |
| First dose on 18 at 2100 |       | PDT      |       |   |   |
+-----------------------------------+-------+----------+-------+---+---+
 
 
 
+-------+----------+-------+---+---+
| Given | 7/29/201 | 20 mg |   |   |
|       | 8 20:43  |       |   |   |
|       | PDT      |       |   |   |
+-------+----------+-------+---+---+
| Given |  | 20 mg |   |   |
|       | 8 09:36  |       |   |   |
|       | PDT      |       |   |   |
+-------+----------+-------+---+---+
 
 
 
+---+---+
|   |   |
+---+---+
 
 
 
+-----------------------------------+-------+----------+--------+---+---+
|   sertraline (ZOLOFT) tablet 100  | Given |  | 100 mg |   |   |
| mg  100 mg, Oral, Daily, First    |       | 8 10:30  |        |   |   |
| dose on Sat 18 at 0800       |       | PDT      |        |   |   |
+-----------------------------------+-------+----------+--------+---+---+
 
 
 
 
+-------+----------+--------+---+---+
| Given |  | 100 mg |   |   |
|       | 8 09:49  |        |   |   |
|       | PDT      |        |   |   |
+-------+----------+--------+---+---+
| Given |  | 100 mg |   |   |
|       | 8 09:43  |        |   |   |
|       | PDT      |        |   |   |
+-------+----------+--------+---+---+
 
 
 
+---+---+
|   |   |
+---+---+
 
 
 
+----------------------------------+-------+----------+-------+---+---+
|   sertraline (ZOLOFT) tablet 50  | Given |  | 50 mg |   |   |
| mg  50 mg, Oral, See Admin       |       | 8 15:55  |       |   |   |
| Instructions, Starting Sat       |       | PDT      |       |   |   |
| 18 at 1500                  |       |          |       |   |   |
+----------------------------------+-------+----------+-------+---+---+
 
 
 
+-------+----------+-------+---+---+
| Given |  | 50 mg |   |   |
|       | 8 15:09  |       |   |   |
|       | PDT      |       |   |   |
+-------+----------+-------+---+---+
 
 
 
+-----------------------------------+---+
|                                   |   |
+-----------------------------------+---+
|   sertraline (ZOLOFT) tablet 50   |   |
| mg  50 mg, Oral, Every Evening,   |   |
| First dose on Mon 18 at 1600 |   |
+-----------------------------------+---+
|                                   |   |
+-----------------------------------+---+
 
 
 
+-----------------------------------+-------+----------+--------+---+---+
|   sodium chloride (PF) 0.9 %      | Given |  | 10 mLs |   |   |
| flush 10 mL  10 mL, Intravenous,  |       | 8 12:09  |        |   |   |
| Every 8 Hours, First dose on Fri  |       | PDT      |        |   |   |
| 18 at 2030                   |       |          |        |   |   |
+-----------------------------------+-------+----------+--------+---+---+
 
 
 
+-------+----------+--------+---+---+
| Given |  | 10 mLs |   |   |
|       | 8 20:44  |        |   |   |
|       | PDT      |        |   |   |
 
+-------+----------+--------+---+---+
| Given |  | 10 mLs |   |   |
|       | 8 09:36  |        |   |   |
|       | PDT      |        |   |   |
+-------+----------+--------+---+---+
 
 
 
+---+---+
|   |   |
+---+---+
 
 
 
+-----------------------------------+-------+----------+-------+---+---+
|   topiramate (TOPAMAX) tablet 25  | Given |  | 25 mg |   |   |
| mg  25 mg, Oral, 2 Times Daily,   |       | 8 09:49  |       |   |   |
| First dose on 18 at 2100 |       | PDT      |       |   |   |
+-----------------------------------+-------+----------+-------+---+---+
 
 
 
+-------+----------+-------+---+---+
| Given |  | 25 mg |   |   |
|       | 8 20:42  |       |   |   |
|       | PDT      |       |   |   |
+-------+----------+-------+---+---+
| Given |  | 25 mg |   |   |
|       | 8 09:37  |       |   |   |
|       | PDT      |       |   |   |
+-------+----------+-------+---+---+
 
 
 
+---+---+
|   |   |
+---+---+
 
 
 
+-----------------------------------+-------+----------+--------+---+---+
|   umeclidinium-vilanterol (ANORO  | Given |  | 1 puff |   |   |
| ELLIPTA) 62.5-25 MCG/INH inhaler  |       | 8 10:28  |        |   |   |
| 1 puff  1 puff, Inhalation,       |       | PDT      |        |   |   |
| Daily, First dose on 18  |       |          |        |   |   |
| at 2030                           |       |          |        |   |   |
+-----------------------------------+-------+----------+--------+---+---+
 
 
 
+-------+----------+--------+---+---+
| Given |  | 1 puff |   |   |
|       | 8 09:47  |        |   |   |
|       | PDT      |        |   |   |
+-------+----------+--------+---+---+
| Given |  | 1 puff |   |   |
|       | 8 09:36  |        |   |   |
|       | PDT      |        |   |   |
+-------+----------+--------+---+---+
 
 
 
 
+---+---+
|   |   |
+---+---+
 in this encounter

## 2018-09-18 NOTE — XMS
Clinical Summary
  Created on: 2018
 
 Jp Addison
 External Reference #: [id number]
 : 48
 Sex: Male
 
 Demographics
 
 
+-----------------------+------------------------+
| Address               | [address]            |
|                       | [place]  [address]   |
+-----------------------+------------------------+
| Home Phone            | [phone]        |
+-----------------------+------------------------+
| Preferred Language    | Unknown                |
+-----------------------+------------------------+
| Marital Status        | Single                 |
+-----------------------+------------------------+
| Jain Affiliation | CHR                    |
+-----------------------+------------------------+
| Race                  | White                  |
+-----------------------+------------------------+
| Ethnic Group          | Not  or  |
+-----------------------+------------------------+
 
 
 Author
 
 
+--------------+-----------------------+
| Author       | [facility] |
+--------------+-----------------------+
| Organization | [facility] |
+--------------+-----------------------+
| Address      | Unknown               |
+--------------+-----------------------+
| Phone        | Unavailable           |
+--------------+-----------------------+
 
 
 
 Support
 
 
+---------------+--------------+---------+-----------------+
| Name          | Relationship | Address | Phone           |
+---------------+--------------+---------+-----------------+
| ASHU ELIZABETH   | ECON         | Unknown | [phone] |
+---------------+--------------+---------+-----------------+
| Ngoc Hough | ECON         | Unknown | [phone] |
+---------------+--------------+---------+-----------------+
 
 
 
 Care Team Providers
 
 
 
+------------------------+------+-----------------+
| Care Team Member Name  | Role | Phone           |
+------------------------+------+-----------------+
| Bj Samuel MD | PP   | [phone] |
+------------------------+------+-----------------+
 
 
 
 Source Comments
 [facility] is fully live on both [facility] Ambulatory and [facility] InPatient.[facility]
 
 Allergies
 
 
+----------------+-----------+----------+----------+----------+
| Active Allergy | Reactions | Severity | Noted    | Comments |
|                |           |          | Date     |          |
+----------------+-----------+----------+----------+----------+
| Peanut         | Hives     |          | 20 |          |
|                |           |          | 10       |          |
+----------------+-----------+----------+----------+----------+
 
 
 
 Current Medications
 
 
+----------------------+----------------------+----------+---------+------+------+-------+
| Prescription         | Sig.                 | Disp.    | Refills | Star | End  | Statu |
|                      |                      |          |         | t    | Date | s     |
|                      |                      |          |         | Date |      |       |
+----------------------+----------------------+----------+---------+------+------+-------+
|   sertraline 100 mg  | Take 100 mg by mouth |          |         | /2 |      | Activ |
| Oral Tablet          |  once daily in the   |          |         |  |      | e     |
|                      | morning. One tab am  |          |         | 10   |      |       |
|                      | 1/2 tab at noon      |          |         |      |      |       |
+----------------------+----------------------+----------+---------+------+------+-------+
|   sertraline 50 mg   | Take 50 mg by mouth  |          |         | 12/2 |      | Activ |
| Oral Tablet          | once daily at noon.  |          |         |  |      | e     |
|                      |                      |          |         | 10   |      |       |
+----------------------+----------------------+----------+---------+------+------+-------+
|   olanzapine         | Take 15 mg by mouth  |          |         | 12/2 |      | Activ |
| (ZYPREXA) 15 mg Oral | once daily at        |          |         |  |      | e     |
|  Tablet              | bedtime.  tab am  |          |         | 10   |      |       |
|                      | 1 tab pm             |          |         |      |      |       |
+----------------------+----------------------+----------+---------+------+------+-------+
|   simvastatin 40 mg  | Take 40 mg by mouth  |          |         |      |      | Activ |
| Oral Tablet          | once daily in the    |          |         |      |      | e     |
|                      | evening.             |          |         |      |      |       |
+----------------------+----------------------+----------+---------+------+------+-------+
|   buPROPion SR       | Take 100 mg by mouth |          |         |      |      | Activ |
| (BUDEPRION SR) 100   |  two times daily.    |          |         |      |      | e     |
| mg Oral Tablet       | Two tabs am one tab  |          |         |      |      |       |
| Sustained Release    | at noon              |          |         |      |      |       |
+----------------------+----------------------+----------+---------+------+------+-------+
|   ergocalciferol     | Take 50,000 Units by |          |         |      |      | Activ |
| (VITAMIN D) 50,000   |  mouth every seven   |          |         |      |      | e     |
 
| unit Oral Capsule    | days.                |          |         |      |      |       |
+----------------------+----------------------+----------+---------+------+------+-------+
|   oxyCODONE,         | Take 1-2 Tabs by     |   50 Tab | 0       | 12/2 |      | Activ |
| immediate release, 5 | mouth every six      |          |         | 7/20 |      | e     |
|  mg Oral Tablet      | hours as needed for  |          |         | 10   |      |       |
|                      | moderate pain.       |          |         |      |      |       |
+----------------------+----------------------+----------+---------+------+------+-------+
|   Blood Sugar        | As instructed.       |   100    | 0       | 12/2 |      | Activ |
| Diagnostic (BLOOD    |                      | Each     |         | 7/20 |      | e     |
| GLUCOSE TEST) Strip  |                      |          |         | 10   |      |       |
+----------------------+----------------------+----------+---------+------+------+-------+
|   albuterol          | Inhale 2 Puffs every |          |         |      |      | Activ |
| (VENTOLIN HFA) 90    |  six hours as        |          |         |      |      | e     |
| mcg/actuation        | needed.              |          |         |      |      |       |
| Inhalation HFA       |                      |          |         |      |      |       |
| Aerosol Inhaler      |                      |          |         |      |      |       |
+----------------------+----------------------+----------+---------+------+------+-------+
|   metFORMIN 500 mg   | Take 500 mg by mouth |          |         |      |      | Activ |
| Oral Tablet          |  two times daily.    |          |         |      |      | e     |
+----------------------+----------------------+----------+---------+------+------+-------+
|   omeprazole 20 mg   | Take 20 mg by mouth  |          |         |      |      | Activ |
| Oral Capsule,        | two times daily.     |          |         |      |      | e     |
| Delayed              |                      |          |         |      |      |       |
| Release(E.C.)        |                      |          |         |      |      |       |
+----------------------+----------------------+----------+---------+------+------+-------+
|   L-Methylfolate     | Take 7.5 mg by mouth |          |         |      |      | Activ |
| (DEPLIN) 15 mg Oral  |  two times daily.    |          |         |      |      | e     |
| Tablet               |                      |          |         |      |      |       |
+----------------------+----------------------+----------+---------+------+------+-------+
 
 
 
 Active Problems
 
 
+----------------------------+------------+
| Problem                    | Noted Date |
+----------------------------+------------+
| Pain in thoracic spine     | 05/10/2012 |
+----------------------------+------------+
| Neck pain                  | 05/10/2012 |
+----------------------------+------------+
| GI bleeding                | 2010 |
+----------------------------+------------+
| Respiratory failure (HCC)  | 2010 |
+----------------------------+------------+
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
+----------------------------+------------+
| Depression                 |            |
+----------------------------+------------+
| Sleep apnea                |            |
+----------------------------+------------+
| Spinal arthrodesis present |            |
+----------------------------+------------+
 
 
 
+----------------------+
|   Overview:   T2- T4 |
+----------------------+
 
 
 
 
+-------------------------------------------------+---+
| Compression fracture of thoracic vertebra (HCC) |   |
+-------------------------------------------------+---+
 
 
 
+------------------+
|   Overview:   T3 |
+------------------+
 
 
 
+----------------------------------------------------+---+
| COPD (chronic obstructive pulmonary disease) (HCC) |   |
+----------------------------------------------------+---+
| Traumatic brain injury (HCC)                       |   |
+----------------------------------------------------+---+
 
 
 
 Immunizations
 
 
+-----------------+------------------------+----------+
| Name            | Dates Previously Given | Next Due |
+-----------------+------------------------+----------+
| Pneumococcal 23 | 2010             |          |
+-----------------+------------------------+----------+
 
 
 
 Family History
 
 
+------------------+----------+------+----------+
| Medical History  | Relation | Name | Comments |
+------------------+----------+------+----------+
| Heart Disease    | Father   |      | Angina   |
+------------------+----------+------+----------+
| Stroke           | Father   |      |          |
+------------------+----------+------+----------+
| Non-contributory | Mother   |      |          |
+------------------+----------+------+----------+
 
 
 
+----------+------+----------+----------+
| Relation | Name | Status   | Comments |
+----------+------+----------+----------+
| Father   |      |  |          |
+----------+------+----------+----------+
| Mother   |      | Alive    |          |
+----------+------+----------+----------+
 
 
 
 Social History
 
 
 
+---------------+-------+-----------+--------+------------------+
| Tobacco Use   | Types | Packs/Day | Years  | Date             |
|               |       |           | Used   |                  |
+---------------+-------+-----------+--------+------------------+
| Former Smoker |       |           |        | Quit: 1998 |
+---------------+-------+-----------+--------+------------------+
 
 
 
+---------------------+---+---+---+
| Smokeless Tobacco:  |   |   |   |
| Never Used          |   |   |   |
+---------------------+---+---+---+
 
 
 
+-------------+-----------+---------+----------+
| Alcohol Use | Drinks/We | oz/Week | Comments |
|             | ek        |         |          |
+-------------+-----------+---------+----------+
| No          |           |         |          |
+-------------+-----------+---------+----------+
 
 
 
+------------------+---------------+
| Sex Assigned at  | Date Recorded |
| Birth            |               |
+------------------+---------------+
| Not on file      |               |
+------------------+---------------+
 
 
 
 Last Filed Vital Signs
 
 
+-------------------+---------------------+-------------------------+
| Vital Sign        | Reading             | Time Taken              |
+-------------------+---------------------+-------------------------+
| Blood Pressure    | 114/80              | 05/10/2012 12:54 PM PDT |
+-------------------+---------------------+-------------------------+
| Pulse             | 88                  | 05/10/2012 12:54 PM PDT |
+-------------------+---------------------+-------------------------+
| Temperature       | 36.8   C (98.3   F) | 05/10/2012 12:54 PM PDT |
+-------------------+---------------------+-------------------------+
| Respiratory Rate  | 20                  | 05/10/2012 12:54 PM PDT |
+-------------------+---------------------+-------------------------+
| Oxygen Saturation | 99%                 | 05/10/2012 12:54 PM PDT |
+-------------------+---------------------+-------------------------+
| Inhaled Oxygen    | -                   | -                       |
| Concentration     |                     |                         |
+-------------------+---------------------+-------------------------+
| Weight            | 79.4 kg (175 lb)    | 05/10/2012 12:54 PM PDT |
+-------------------+---------------------+-------------------------+
| Height            | 176.5 cm (5' 9.5")  | 05/10/2012 12:54 PM PDT |
+-------------------+---------------------+-------------------------+
| Body Mass Index   | 25.47               | 05/10/2012 12:54 PM PDT |
 
+-------------------+---------------------+-------------------------+
 
 
 
 Plan of Treatment
 
 
+----------------------+-----------+------------+----------+
| Health Maintenance   | Due Date  | Last Done  | Comments |
+----------------------+-----------+------------+----------+
| Pneumococcal (Adult) |  | 2010 |          |
|  (1 of 2 - PCV13)    | 4         |            |          |
+----------------------+-----------+------------+----------+
| INFLUENZA VACCINE    |  |            |          |
| (FLU SHOT)           | 8         |            |          |
+----------------------+-----------+------------+----------+
 
 
 
 Results
 Not on filefrom Last 3 Months

## 2018-09-18 NOTE — XMS
Encounter Summary
  Created on: 2018
 
 [place] Jp RIVERA
 External Reference #: [id number]
 : 48
 Sex: Male
 
 Demographics
 
 
+-----------------------+----------------------+
| Address               | [address]          |
|                       | [place]  [address] |
+-----------------------+----------------------+
| Home Phone            | [phone]      |
+-----------------------+----------------------+
| Preferred Language    | Unknown              |
+-----------------------+----------------------+
| Marital Status        |              |
+-----------------------+----------------------+
| Catholic Affiliation | 1041                 |
+-----------------------+----------------------+
| Race                  | Unknown              |
+-----------------------+----------------------+
| Ethnic Group          | Unknown              |
+-----------------------+----------------------+
 
 
 Author
 
 
+--------------+-----------------------+
| Author       | [facility] |
+--------------+-----------------------+
| Organization | [facility] |
+--------------+-----------------------+
| Address      | Unknown               |
+--------------+-----------------------+
| Phone        | Unavailable           |
+--------------+-----------------------+
 
 
 
 Support
 
 
+---------------+--------------+---------+-----------------+
| Name          | Relationship | Address | Phone           |
+---------------+--------------+---------+-----------------+
| Delilah Hall | ECON         | Unknown | [phone] |
+---------------+--------------+---------+-----------------+
| Tesha Veloz   | ECON         | Unknown | [phone] |
+---------------+--------------+---------+-----------------+
 
 
 
 Care Team Providers
 
 
 
+-----------------------+------+-----------------+
| Care Team Member Name | Role | Phone           |
+-----------------------+------+-----------------+
| Kirby Guo  | PCP  | [phone] |
+-----------------------+------+-----------------+
 
 
 
 Encounter Details
 
 
+--------+-----------+---------------------+--------------------+-------------+
| Date   | Type      | Department          | Care Team          | Description |
+--------+-----------+---------------------+--------------------+-------------+
| / | Telephone |   [facility]     |   Nila Linder RN |             |
|    |           | Pulmonology  [address]   |                    |             |
|        |           | [address]   |                    |             |
|        |           | [place]        |                    |             |
|        |           | [id number]          |                    |             |
|        |           | [phone]        |                    |             |
+--------+-----------+---------------------+--------------------+-------------+
 
 
 
 Social History
 
 
+---------------+-------+-----------+--------+------------------+
| Tobacco Use   | Types | Packs/Day | Years  | Date             |
|               |       |           | Used   |                  |
+---------------+-------+-----------+--------+------------------+
| Former Smoker |       | 3         | 19     | Quit: 1998 |
+---------------+-------+-----------+--------+------------------+
 
 
 
+---------------------+---+---+---+
| Smokeless Tobacco:  |   |   |   |
| Never Used          |   |   |   |
+---------------------+---+---+---+
 
 
 
+-------------+-----------+---------+----------+
| Alcohol Use | Drinks/We | oz/Week | Comments |
|             | ek        |         |          |
+-------------+-----------+---------+----------+
| No          |           |         |          |
+-------------+-----------+---------+----------+
 
 
 
+------------------+---------------+
| Sex Assigned at  | Date Recorded |
| Birth            |               |
+------------------+---------------+
| Not on file      |               |
+------------------+---------------+
 
 as of this encounter
 
 Plan of Treatment
 
 
+--------+---------+-------------+----------------------+-------------+
| Date   | Type    | Specialty   | Care Team            | Description |
+--------+---------+-------------+----------------------+-------------+
| 10/19/ | Office  | Pulmonology |   Ben,          |             |
|    | Visit   |             | Edwina Mckeon,   |             |
|        |         |             | MD [address] |             |
|        |         |             |   [facility] [place] [address] |             |
|        |         |             |   [phone]       |             |
|        |         |             | [phone] (Fax)   |             |
+--------+---------+-------------+----------------------+-------------+
 as of this encounter
 
 Visit Diagnoses
 Not on filein this encounter

## 2018-09-18 NOTE — XMS
Clinical Summary
  Created on: 2018
 
 Jp Addison
 External Reference #: [id number]
 : 48
 Sex: Male
 
 Demographics
 
 
+-----------------------+------------------------+
| Address               | [address]            |
|                       | [place]  [address]   |
+-----------------------+------------------------+
| Home Phone            | [phone]        |
+-----------------------+------------------------+
| Preferred Language    | Unknown                |
+-----------------------+------------------------+
| Marital Status        | Single                 |
+-----------------------+------------------------+
| [employer] Affiliation | [facility]                    |
+-----------------------+------------------------+
| Race                  | White                  |
+-----------------------+------------------------+
| Ethnic Group          | Not  or  |
+-----------------------+------------------------+
 
 
 Author
 
 
+--------------+-----------------------+
| Author       | [facility] |
+--------------+-----------------------+
| Organization | [facility] |
+--------------+-----------------------+
| Address      | Unknown               |
+--------------+-----------------------+
| Phone        | Unavailable           |
+--------------+-----------------------+
 
 
 
 Support
 
 
+---------------+--------------+---------+-----------------+
| Name          | Relationship | Address | Phone           |
+---------------+--------------+---------+-----------------+
| ASHU ELIZABETH   | ECON         | Unknown | [phone] |
+---------------+--------------+---------+-----------------+
| Ngoc Hough | ECON         | Unknown | [phone] |
+---------------+--------------+---------+-----------------+
 
 
 
 Care Team Providers
 
 
 
+------------------------+------+-----------------+
| Care Team Member Name  | Role | Phone           |
+------------------------+------+-----------------+
| Bj Samuel MD | PP   | [phone] |
+------------------------+------+-----------------+
 
 
 
 Source Comments
 [facility] is fully live on both [facility] and [facility]
 
 Allergies
 
 
+----------------+-----------+----------+----------+----------+
| Active Allergy | Reactions | Severity | Noted    | Comments |
|                |           |          | Date     |          |
+----------------+-----------+----------+----------+----------+
| Peanut         | Hives     |          | 20 |          |
|                |           |          | 10       |          |
+----------------+-----------+----------+----------+----------+
 
 
 
 Current Medications
 
 
+----------------------+----------------------+----------+---------+------+------+-------+
| Prescription         | Sig.                 | Disp.    | Refills | Star | End  | Statu |
|                      |                      |          |         | t    | Date | s     |
|                      |                      |          |         | Date |      |       |
+----------------------+----------------------+----------+---------+------+------+-------+
|   sertraline 100 mg  | Take 100 mg by mouth |          |         | /2 |      | Activ |
| Oral Tablet          |  once daily in the   |          |         |  |      | e     |
|                      | morning. One tab am  |          |         | 10   |      |       |
|                      | 1/2 tab at noon      |          |         |      |      |       |
+----------------------+----------------------+----------+---------+------+------+-------+
|   sertraline 50 mg   | Take 50 mg by mouth  |          |         | 12/2 |      | Activ |
| Oral Tablet          | once daily at noon.  |          |         |  |      | e     |
|                      |                      |          |         | 10   |      |       |
+----------------------+----------------------+----------+---------+------+------+-------+
|   olanzapine         | Take 15 mg by mouth  |          |         | 12/2 |      | Activ |
| (ZYPREXA) 15 mg Oral | once daily at        |          |         |  |      | e     |
|  Tablet              | bedtime.  tab am  |          |         | 10   |      |       |
|                      | 1 tab pm             |          |         |      |      |       |
+----------------------+----------------------+----------+---------+------+------+-------+
|   simvastatin 40 mg  | Take 40 mg by mouth  |          |         |      |      | Activ |
| Oral Tablet          | once daily in the    |          |         |      |      | e     |
|                      | evening.             |          |         |      |      |       |
+----------------------+----------------------+----------+---------+------+------+-------+
|   buPROPion SR       | Take 100 mg by mouth |          |         |      |      | Activ |
| (BUDEPRION SR) 100   |  two times daily.    |          |         |      |      | e     |
| mg Oral Tablet       | Two tabs am one tab  |          |         |      |      |       |
| Sustained Release    | at noon              |          |         |      |      |       |
+----------------------+----------------------+----------+---------+------+------+-------+
|   ergocalciferol     | Take 50,000 Units by |          |         |      |      | Activ |
| (VITAMIN D) 50,000   |  mouth every seven   |          |         |      |      | e     |
 
| unit Oral Capsule    | days.                |          |         |      |      |       |
+----------------------+----------------------+----------+---------+------+------+-------+
|   oxyCODONE,         | Take 1-2 Tabs by     |   50 Tab | 0       | 12/2 |      | Activ |
| immediate release, 5 | mouth every six      |          |         | 7/20 |      | e     |
|  mg Oral Tablet      | hours as needed for  |          |         | 10   |      |       |
|                      | moderate pain.       |          |         |      |      |       |
+----------------------+----------------------+----------+---------+------+------+-------+
|   Blood Sugar        | As instructed.       |   100    | 0       | 12/2 |      | Activ |
| Diagnostic (BLOOD    |                      | Each     |         | 7/20 |      | e     |
| GLUCOSE TEST) Strip  |                      |          |         | 10   |      |       |
+----------------------+----------------------+----------+---------+------+------+-------+
|   albuterol          | Inhale 2 Puffs every |          |         |      |      | Activ |
| (VENTOLIN HFA) 90    |  six hours as        |          |         |      |      | e     |
| mcg/actuation        | needed.              |          |         |      |      |       |
| Inhalation HFA       |                      |          |         |      |      |       |
| Aerosol Inhaler      |                      |          |         |      |      |       |
+----------------------+----------------------+----------+---------+------+------+-------+
|   metFORMIN 500 mg   | Take 500 mg by mouth |          |         |      |      | Activ |
| Oral Tablet          |  two times daily.    |          |         |      |      | e     |
+----------------------+----------------------+----------+---------+------+------+-------+
|   omeprazole 20 mg   | Take 20 mg by mouth  |          |         |      |      | Activ |
| Oral Capsule,        | two times daily.     |          |         |      |      | e     |
| Delayed              |                      |          |         |      |      |       |
| Release(E.C.)        |                      |          |         |      |      |       |
+----------------------+----------------------+----------+---------+------+------+-------+
|   L-Methylfolate     | Take 7.5 mg by mouth |          |         |      |      | Activ |
| (DEPLIN) 15 mg Oral  |  two times daily.    |          |         |      |      | e     |
| Tablet               |                      |          |         |      |      |       |
+----------------------+----------------------+----------+---------+------+------+-------+
 
 
 
 Active Problems
 
 
+----------------------------+------------+
| Problem                    | Noted Date |
+----------------------------+------------+
| Pain in thoracic spine     | 05/10/2012 |
+----------------------------+------------+
| Neck pain                  | 05/10/2012 |
+----------------------------+------------+
| GI bleeding                | 2010 |
+----------------------------+------------+
| Respiratory failure (HCC)  | 2010 |
+----------------------------+------------+
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
+----------------------------+------------+
| Depression                 |            |
+----------------------------+------------+
| Sleep apnea                |            |
+----------------------------+------------+
| Spinal arthrodesis present |            |
+----------------------------+------------+
 
 
 
+----------------------+
|   Overview:   T2- T4 |
+----------------------+
 
 
 
 
+-------------------------------------------------+---+
| Compression fracture of thoracic vertebra (HCC) |   |
+-------------------------------------------------+---+
 
 
 
+------------------+
|   Overview:   T3 |
+------------------+
 
 
 
+----------------------------------------------------+---+
| COPD (chronic obstructive pulmonary disease) (HCC) |   |
+----------------------------------------------------+---+
| Traumatic brain injury (HCC)                       |   |
+----------------------------------------------------+---+
 
 
 
 Immunizations
 
 
+-----------------+------------------------+----------+
| Name            | Dates Previously Given | Next Due |
+-----------------+------------------------+----------+
| Pneumococcal 23 | 2010             |          |
+-----------------+------------------------+----------+
 
 
 
 Family History
 
 
+------------------+----------+------+----------+
| Medical History  | Relation | Name | Comments |
+------------------+----------+------+----------+
| Heart Disease    | Father   |      | Angina   |
+------------------+----------+------+----------+
| Stroke           | Father   |      |          |
+------------------+----------+------+----------+
| Non-contributory | Mother   |      |          |
+------------------+----------+------+----------+
 
 
 
+----------+------+----------+----------+
| Relation | Name | Status   | Comments |
+----------+------+----------+----------+
| Father   |      |  |          |
+----------+------+----------+----------+
| Mother   |      | Alive    |          |
+----------+------+----------+----------+
 
 
 
 Social History
 
 
 
+---------------+-------+-----------+--------+------------------+
| Tobacco Use   | Types | Packs/Day | Years  | Date             |
|               |       |           | Used   |                  |
+---------------+-------+-----------+--------+------------------+
| Former Smoker |       |           |        | Quit: 1998 |
+---------------+-------+-----------+--------+------------------+
 
 
 
+---------------------+---+---+---+
| Smokeless Tobacco:  |   |   |   |
| Never Used          |   |   |   |
+---------------------+---+---+---+
 
 
 
+-------------+-----------+---------+----------+
| Alcohol Use | Drinks/We | oz/Week | Comments |
|             | ek        |         |          |
+-------------+-----------+---------+----------+
| No          |           |         |          |
+-------------+-----------+---------+----------+
 
 
 
+------------------+---------------+
| Sex Assigned at  | Date Recorded |
| Birth            |               |
+------------------+---------------+
| Not on file      |               |
+------------------+---------------+
 
 
 
 Last Filed Vital Signs
 
 
+-------------------+---------------------+-------------------------+
| Vital Sign        | Reading             | Time Taken              |
+-------------------+---------------------+-------------------------+
| Blood Pressure    | 114/80              | 05/10/2012 12:54 PM PDT |
+-------------------+---------------------+-------------------------+
| Pulse             | 88                  | 05/10/2012 12:54 PM PDT |
+-------------------+---------------------+-------------------------+
| Temperature       | 36.8   C (98.3   F) | 05/10/2012 12:54 PM PDT |
+-------------------+---------------------+-------------------------+
| Respiratory Rate  | 20                  | 05/10/2012 12:54 PM PDT |
+-------------------+---------------------+-------------------------+
| Oxygen Saturation | 99%                 | 05/10/2012 12:54 PM PDT |
+-------------------+---------------------+-------------------------+
| Inhaled Oxygen    | -                   | -                       |
| Concentration     |                     |                         |
+-------------------+---------------------+-------------------------+
| Weight            | 79.4 kg (175 lb)    | 05/10/2012 12:54 PM PDT |
+-------------------+---------------------+-------------------------+
| Height            | 176.5 cm (5' 9.5")  | 05/10/2012 12:54 PM PDT |
+-------------------+---------------------+-------------------------+
| Body Mass Index   | 25.47               | 05/10/2012 12:54 PM PDT |
 
+-------------------+---------------------+-------------------------+
 
 
 
 Plan of Treatment
 
 
+----------------------+-----------+------------+----------+
| Health Maintenance   | Due Date  | Last Done  | Comments |
+----------------------+-----------+------------+----------+
| Pneumococcal (Adult) |  | 2010 |          |
|  (1 of 2 - PCV13)    | 4         |            |          |
+----------------------+-----------+------------+----------+
| INFLUENZA VACCINE    |  |            |          |
| (FLU SHOT)           | 8         |            |          |
+----------------------+-----------+------------+----------+
 
 
 
 Results
 Not on filefrom Last 3 Months

## 2018-09-18 NOTE — XMS
Clinical Summary
  Created on: 2018
 
 Jp Clarke
 External Reference #: [id number]
 : 48
 Sex: Male
 
 Demographics
 
 
+-----------------------+----------------------+
| Address               | [address]          |
|                       | [place]  [address] |
+-----------------------+----------------------+
| Home Phone            | [phone]      |
+-----------------------+----------------------+
| Preferred Language    | Unknown              |
+-----------------------+----------------------+
| Marital Status        |              |
+-----------------------+----------------------+
| [employer] Affiliation | [id number]                 |
+-----------------------+----------------------+
| Race                  | Unknown              |
+-----------------------+----------------------+
| Ethnic Group          | Unknown              |
+-----------------------+----------------------+
 
 
 Author
 
 
+--------------+-----------------------+
| Author       | [facility] |
+--------------+-----------------------+
| Organization | [facility] |
+--------------+-----------------------+
| Address      | Unknown               |
+--------------+-----------------------+
| Phone        | Unavailable           |
+--------------+-----------------------+
 
 
 
 Support
 
 
+---------------+--------------+---------+-----------------+
| Name          | Relationship | Address | Phone           |
+---------------+--------------+---------+-----------------+
| Delilah Hall | ECON         | Unknown | [phone] |
+---------------+--------------+---------+-----------------+
| Tesha Veloz   | ECON         | Unknown | [phone] |
+---------------+--------------+---------+-----------------+
 
 
 
 Care Team Providers
 
 
 
+-----------------------+------+-----------------+
| Care Team Member Name | Role | Phone           |
+-----------------------+------+-----------------+
| Mai Guo  | PP   | [phone] |
+-----------------------+------+-----------------+
 
 
 
 Allergies
 
 
+----------------+----------------------+----------+----------+----------+
| Active Allergy | Reactions            | Severity | Noted    | Comments |
|                |                      |          | Date     |          |
+----------------+----------------------+----------+----------+----------+
| Peanut Oil     | Shortness of Breath, | High     | 12/20/20 |          |
|                |  Hives               |          | 10       |          |
+----------------+----------------------+----------+----------+----------+
 
 
 
 Current Medications
 
 
+----------------------+----------------------+----------+---------+------+------+-------+
| Prescription         | Sig.                 | Disp.    | Refills | Star | End  | Statu |
|                      |                      |          |         | t    | Date | s     |
|                      |                      |          |         | Date |      |       |
+----------------------+----------------------+----------+---------+------+------+-------+
|   ergocalciferol     | Take 50,000 Units by |          |         |      |      | Activ |
| (DRISDOL) 21543      |  mouth once a week.  |          |         |      |      | e     |
| units capsule        |                      |          |         |      |      |       |
+----------------------+----------------------+----------+---------+------+------+-------+
|   omeprazole         | Take 20 mg by mouth  |          |         |      |      | Activ |
| (PRILOSEC) 20 MG     | 2 (two) times daily. |          |         |      |      | e     |
| capsule              |                      |          |         |      |      |       |
+----------------------+----------------------+----------+---------+------+------+-------+
|   simvastatin        | Take 40 mg by mouth  |          |         |      |      | Activ |
| (ZOCOR) 40 MG tablet | nightly.             |          |         |      |      | e     |
+----------------------+----------------------+----------+---------+------+------+-------+
|   polyethylene       | Take 17 g by mouth   |          |         |      |      | Activ |
| glycol (GLYCOLAX)    | daily.               |          |         |      |      | e     |
| packet               |                      |          |         |      |      |       |
+----------------------+----------------------+----------+---------+------+------+-------+
|   budesonide         | Take 0.5 mg by       |          |         |      |      | Activ |
| (PULMICORT) 0.5      | nebulization 2 (two) |          |         |      |      | e     |
| MG/2ML nebulizer     |  times daily.        |          |         |      |      |       |
| suspension           |                      |          |         |      |      |       |
+----------------------+----------------------+----------+---------+------+------+-------+
|   topiramate         | Take 25 mg by mouth  |          |         |      |      | Activ |
| (TOPAMAX) 25 MG      | 2 (two) times daily. |          |         |      |      | e     |
| tablet               |                      |          |         |      |      |       |
+----------------------+----------------------+----------+---------+------+------+-------+
|   sertraline         | Take  mg by    |          |         |      |      | Activ |
| (ZOLOFT) 100 MG      | mouth See Admin      |          |         |      |      | e     |
| tablet               | Instructions. Take   |          |         |      |      |       |
|                      | 100 mg by mouth      |          |         |      |      |       |
|                      | every morning and 50 |          |         |      |      |       |
 
|                      |  mg by mouth in the  |          |         |      |      |       |
|                      | afternoon            |          |         |      |      |       |
+----------------------+----------------------+----------+---------+------+------+-------+
|   buPROPion          | Take 300 mg by mouth |          |         |      |      | Activ |
| (WELLBUTRIN XL) 300  |  every morning.      |          |         |      |      | e     |
| MG 24 hr tablet      |                      |          |         |      |      |       |
+----------------------+----------------------+----------+---------+------+------+-------+
|   OLANZapine         | Take 10 mg by mouth  |          |         |      |      | Activ |
| (ZYPREXA) 20 MG      | 2 (two) times daily. |          |         |      |      | e     |
| tablet               |                      |          |         |      |      |       |
+----------------------+----------------------+----------+---------+------+------+-------+
|   L-methylfolate     | Take 15 mg by mouth  |          |         |      |      | Activ |
| (DEPLIN) 15 MG       | daily with           |          |         |      |      | e     |
| tablet               | breakfast.           |          |         |      |      |       |
+----------------------+----------------------+----------+---------+------+------+-------+
|   Methylcobalamin    | Take 5,000 mcg by    |          |         |      |      | Activ |
| (METHYL B-12 PO)     | mouth 2 (two) times  |          |         |      |      | e     |
|                      | daily.               |          |         |      |      |       |
+----------------------+----------------------+----------+---------+------+------+-------+
|   propranolol        | Take 20 mg by mouth  |          |         |      |      | Activ |
| (INDERAL) 20 MG      | 2 (two) times daily. |          |         |      |      | e     |
| tablet               |                      |          |         |      |      |       |
+----------------------+----------------------+----------+---------+------+------+-------+
|   lurasidone         | Take 40 mg by mouth  |          |         |      |      | Activ |
| (LATUDA) 40 MG       | daily.               |          |         |      |      | e     |
| tablet               |                      |          |         |      |      |       |
+----------------------+----------------------+----------+---------+------+------+-------+
|   oxycodone (OXY-IR) | Take 1 capsule by    |   30     | 0       | 04/0 |      | Activ |
|  5 MG capsule        | mouth every 6 (six)  | capsule  |         | 8/20 |      | e     |
|                      | hours as needed.     |          |         | 18   |      |       |
+----------------------+----------------------+----------+---------+------+------+-------+
|   albuterol          | Inhale 2 puffs into  |          |         |      |      | Activ |
| (PROVENTIL           | the lungs every 4    |          |         |      |      | e     |
| HFA;VENTOLIN HFA)    | (four) hours as      |          |         |      |      |       |
| 108 (90 Base)        | needed for Wheezing. |          |         |      |      |       |
| MCG/ACT inhaler      |                      |          |         |      |      |       |
+----------------------+----------------------+----------+---------+------+------+-------+
|                      | Inhale 1 puff into   |   1 each | 11      | 07/2 |      | Activ |
| umeclidinium-vilante | the lungs daily.     |          |         | 6/20 |      | e     |
| rol (ANORO ELLIPTA)  |                      |          |         | 18   |      |       |
| 62.5-25 MCG/INH      |                      |          |         |      |      |       |
| inhalerIndications:  |                      |          |         |      |      |       |
| Asthma with COPD     |                      |          |         |      |      |       |
| (chronic obstructive |                      |          |         |      |      |       |
|  pulmonary disease)  |                      |          |         |      |      |       |
| ([facility])                |                      |          |         |      |      |       |
+----------------------+----------------------+----------+---------+------+------+-------+
|   Acetaminophen 500  | Take 1,000 mg by     |          |         |      |      | Activ |
| MG coapsule          | mouth 4 (four) times |          |         |      |      | e     |
|                      |  daily.              |          |         |      |      |       |
+----------------------+----------------------+----------+---------+------+------+-------+
|   furosemide (LASIX) | Take 20 mg by mouth  |          |         |      |      | Activ |
|  20 MG tablet        | daily.               |          |         |      |      | e     |
+----------------------+----------------------+----------+---------+------+------+-------+
|   guaifenesin        | Take 1,200 mg by     |          |         |      |      | Activ |
| (MUCINEX MAX) 1200   | mouth every 6 (six)  |          |         |      |      | e     |
| MG 12hr tablet       | hours as needed.     |          |         |      |      |       |
+----------------------+----------------------+----------+---------+------+------+-------+
|   dextromethorphan   | Take 60 mg by mouth  |          |         |      |      | Activ |
| polistirex (DELSYM)  | every 12 (twelve)    |          |         |      |      | e     |
 
| 30 MG/5ML ER         | hours as needed for  |          |         |      |      |       |
| suspension           | Cough.               |          |         |      |      |       |
+----------------------+----------------------+----------+---------+------+------+-------+
|   levofloxacin       | Take 1 tablet by     |   5      | 0       | 07/3 |      | Activ |
| (LEVAQUIN) 750 MG    | mouth daily.         | tablet   |         | 0/20 |      | e     |
| tablet               |                      |          |         | 18   |      |       |
+----------------------+----------------------+----------+---------+------+------+-------+
|                      | Every 4 hours x 5    |   360 mL | 0       | 07/3 |      | Activ |
| ipratropium-albutero | days then Q4H prn    |          |         | 0/20 |      | e     |
| l (DUO-NEB) 0.5-2.5  | for SOB or wheezing  |          |         | 18   |      |       |
| mg/3mL               |                      |          |         |      |      |       |
+----------------------+----------------------+----------+---------+------+------+-------+
|   predniSONE         | Take 2 tabs x 3 days |   6      | 0       | 07/3 |      | Activ |
| (DELTASONE) 20 MG    |  then stop           | tablet   |         | 0/20 |      | e     |
| tablet               |                      |          |         | 18   |      |       |
+----------------------+----------------------+----------+---------+------+------+-------+
 
 
 
 Active Problems
 
 
+----------------------------------------------------------------+------------+
| Problem                                                        | Noted Date |
+----------------------------------------------------------------+------------+
| Hypoxemia                                                      | 2018 |
+----------------------------------------------------------------+------------+
| BMI 31.0-31.9,adult                                            | 2018 |
+----------------------------------------------------------------+------------+
| Schizoaffective disorder (HCC)                                 | 2018 |
+----------------------------------------------------------------+------------+
| Leg swelling                                                   | 2018 |
+----------------------------------------------------------------+------------+
| COPD (chronic obstructive pulmonary disease) (HCC)             | 2018 |
+----------------------------------------------------------------+------------+
| Asthma with COPD (chronic obstructive pulmonary disease) (HCC) | 2018 |
+----------------------------------------------------------------+------------+
| Personal history of tobacco use, presenting hazards to health  | 2018 |
+----------------------------------------------------------------+------------+
| Nocturnal hypoxemia                                            | 2018 |
+----------------------------------------------------------------+------------+
| EMELINA (obstructive sleep apnea)                                  | 2018 |
+----------------------------------------------------------------+------------+
| Multiple pulmonary nodules                                     | 2018 |
+----------------------------------------------------------------+------------+
| Compression fracture of thoracic vertebra (HCC)                | 2018 |
+----------------------------------------------------------------+------------+
| Traumatic brain injury (HCC)                                   | 2018 |
+----------------------------------------------------------------+------------+
| Hypoxemia                                                      | 2018 |
+----------------------------------------------------------------+------------+
| COPD (chronic obstructive pulmonary disease) (HCC)             | 2018 |
+----------------------------------------------------------------+------------+
| Schizoaffective disorder (HCC)                                 | 2018 |
+----------------------------------------------------------------+------------+
| Essential hypertension                                         | 2018 |
+----------------------------------------------------------------+------------+
| Memory deficits                                                | 2018 |
+----------------------------------------------------------------+------------+
| GI bleeding                                                    | 2010 |
 
+----------------------------------------------------------------+------------+
 
 
 
 Resolved Problems
 
 
+----------+----------+-----------+
| Problem  | Noted    | Resolved  |
|          | Date     | Date      |
+----------+----------+-----------+
| Weakness | 20 |  |
|          | 18       | 8         |
+----------+----------+-----------+
 
 
 
 Encounters
 
 
+--------+-----------+-----------+----------------------+----------------------+
| Date   | Type      | Specialty | Care Team            | Description          |
+--------+-----------+-----------+----------------------+----------------------+
| / | Emergency |           |                      | Fall, initial        |
|    |           |           |                      | encounter (Primary   |
|        |           |           |                      | Dx); Closed          |
|        |           |           |                      | compression fracture |
|        |           |           |                      |  of first lumbar     |
|        |           |           |                      | vertebra, initial    |
|        |           |           |                      | encounter (HCC)      |
+--------+-----------+-----------+----------------------+----------------------+
| / | Telephone |           |   Nila Linder RN   |                      |
|    |           |           |                      |                      |
+--------+-----------+-----------+----------------------+----------------------+
| / | Telephone |           |   Mary Castellanos   | Medication Problem   |
| 2018   |           |           | T, RN                | (Prior auth, [employer]     |
|        |           |           |                      | neb, [employer])          |
+--------+-----------+-----------+----------------------+----------------------+
| / | Hospital  |           |   Pedro Luis Herrera  | COPD with acute      |
| 2018 - | Encounter |           | DO Alvino LOWRY Grace, | exacerbation (HCC)   |
|        |           |           |  Onur Molina MD   | (Primary Dx);        |
| / |           |           |                      | Dyspnea on exertion; |
| 2018   |           |           |                      |  Hypoxemia; Leg      |
|        |           |           |                      | swelling             |
+--------+-----------+-----------+----------------------+----------------------+
 
 
 
+---+-------------+
|   |   Discharge |
|   |  Summaries  |
|   | - Yajaira      |
|   | MD Onur -  |
|   | 2018  |
|   | 12:47 PM    |
|   | PDT         |
|   | Formatting  |
|   | of this     |
|   | note may be |
|   |  different  |
 
|   | from the    |
|   | original.[facility] |
|   | [facility]        |
|   | [facility]    |
|   | [facility]     |
|   | [facility]      |
|   | Service:    |
|   | Hospitalist |
|   | Discharge   |
|   | SummaryDate |
|   |  of         |
|   | Admission:  |
|   |             |
|   | 2018Da |
|   | te of       |
|   | Discharge:  |
|   |             |
|   | 2018Di |
|   | schaemmiege     |
|   | Provider:   |
|   | Onur Gates   |
|   | MDTreatment |
|   |  Team:      |
|   | Admitting   |
|   | Provider:   |
|   | Eliane       |
|   | Hercl,      |
|   | DODischarge |
|   |  Diagnoses: |
|   |   Principal |
|   |  Problem:   |
|   | HypoxemiaAc |
|   | tive        |
|   | Problems:   |
|   | BMI         |
|   | 31.0-31.9,a |
|   | dult        |
|   | Schizoaffec |
|   | tive        |
|   | disorder    |
|   | (HCC)  Leg  |
|   | swelling    |
|   | COPD        |
|   | (chronic    |
|   | obstructive |
|   |  pulmonary  |
|   | disease)    |
|   | (HCC)Resolv |
|   | ed          |
|   | Problems:   |
|   | * No        |
|   | resolved    |
|   | hospital    |
|   | problems.   |
|   | *Final      |
|   | Diagnoses:  |
|   |   Acute on  |
|   | chronic     |
|   | hypoxic     |
|   | respiratory |
 
|   |  failure    |
|   | CAP   EMELINA,  |
|   | nocturnal   |
|   | oxygen use  |
|   | but refuses |
|   |  CPAP       |
|   | COPD        |
|   | Schizoaffec |
|   | tive        |
|   | disorder    |
|   | Persistent  |
|   | leukocytosi |
|   | s due to    |
|   | prednisoneP |
|   | rocedures:  |
|   |  * No       |
|   | surgery     |
|   | found       |
|   | *Significan |
|   | t           |
|   | Diagnostic  |
|   | Studies:    |
|   | Xr Chest Pa |
|   |  And        |
|   | LateralResu |
|   | lt Date:    |
|   | 2018Bi |
|   | lateral     |
|   | pulmonary   |
|   | vascular    |
|   | congestion. |
|   |  Bibasilar  |
|   | atelectasis |
|   | .           |
|   | Electronica |
|   | lly signed  |
|   | by Power    |
|   | Reeve MD on |
|   |  2018  |
|   | 7:10 PMCta  |
|   | Chest       |
|   | Pulmonary   |
|   | Embolism W  |
|   | Iv          |
|   | ConResult   |
|   | Date:       |
|   | 20181. |
|   |  Some       |
|   | images are  |
|   | degraded    |
|   | due to      |
|   | motion      |
|   | artifact.   |
|   | No obvious  |
|   | pulmonary   |
|   | emboli. 2.  |
|   | Emphysema   |
|   | with        |
|   | pulmonary   |
|   | vascular    |
 
|   | congestion  |
|   | and         |
|   | bilateral   |
|   | infiltrate  |
|   | or          |
|   | atelectasis |
|   | , right     |
|   | greater     |
|   | than left.  |
|   | 3. Dilated  |
|   | ascending   |
|   | aorta       |
|   | measuring   |
|   | 4.4 cm. No  |
|   | aortic      |
|   | dissection. |
|   |  4. Marked  |
|   | tracheomala |
|   | adriane. [employer]  |
|   |             |
|   | Electronica |
|   | lly signed  |
|   | by Alex     |
|   | Mayhle, MD  |
|   | on    |
|   | 2018        |
|   | 12:56AM     |
|   | Referring   |
|   | Provider    |
|   | Line:       |
|   | [phone] |
|   | 5SITE ID:   |
|   | [id number] Lower |
|   |  Extremity  |
|   | Venous      |
|   | Doppler     |
|   | BilateralRe |
|   | sult Date:  |
|   | 20181. |
|   |   No        |
|   | evidence of |
|   |  lower      |
|   | extremity   |
|   | deep vein   |
|   | thrombosis. |
|   |             |
|   | Electronica |
|   | lly signed  |
|   | by Mega T  |
|   | Alejandrina,   |
|   | MD on       |
|   | 2018   |
|   | 9:48        |
|   | PMBRIEF     |
|   | HISTORY OF  |
|   | PRESENTATIO |
|   | N:          |
|   | Jp L   |
|   | [place]  |
|   | is a 68     |
 
|   | y.o. male   |
|   | with PMH    |
|   | significant |
|   |  for pHTN,  |
|   | COPD, EMELINA,  |
|   | schizoaffec |
|   | tive        |
|   | disorder    |
|   | admitted    |
|   | for SOB.    |
|   | Pt is a     |
|   | resident of |
|   |  a group    |
|   | home in     |
|   | [place].  |
|   | Pt uses     |
|   | nocturnal   |
|   | o2 but      |
|   | refuses     |
|   | CPAP.  Pt   |
|   | sees Dr.    |
|   | Ben.   |
|   | Pt wishes   |
|   | to be       |
|   | DNR/DNI.    |
|   | Please      |
|   | refer to    |
|   | H&P for     |
|   | full        |
|   | details.HOS |
|   | PITAL       |
|   | COURSE:     |
|   |    Pt has   |
|   | been having |
|   |  SOB, COTA,  |
|   | orthopnea,  |
|   | edema in    |
|   | legs,       |
|   | productive  |
|   | cough,      |
|   | wheezing.   |
|   | Pt had CTA  |
|   | chest that  |
|   | was         |
|   | negative    |
|   | for PE but  |
|   | showed      |
|   | emphysema   |
|   | with        |
|   | pulmonary   |
|   | vascular    |
|   | congestion  |
|   | and         |
|   | bilateral   |
|   | infiltrate  |
|   | or          |
|   | atelectasis |
|   |  and marked |
|   |             |
|   | tracheomala |
 
|   | adriane.  RVP   |
|   | was         |
|   | negative,   |
|   | sputum      |
|   | culture was |
|   |  not a good |
|   |  specimen.  |
|   |             |
|   | procalciton |
|   | in was      |
|   | 0.93.  It   |
|   | was felt    |
|   | that his    |
|   | symptoms    |
|   | were due to |
|   |  bilateral  |
|   | PNA with    |
|   | diastolic   |
|   | CHF         |
|   | exacerbatio |
|   | n with      |
|   | underlying  |
|   | pulm HTN.   |
|   | Pt was      |
|   | started on  |
|   | IV levaquin |
|   |  and was    |
|   | diuresed.   |
|   | Pt also     |
|   | started on  |
|   | prednisone  |
|   | for concern |
|   |  for COPD   |
|   | exacerbatio |
|   | n but this  |
|   | is felt     |
|   | less likely |
|   |  will taper |
|   |  off the    |
|   | prednisone  |
|   | quickly.    |
|   | Pt still    |
|   | with coarse |
|   |  breath     |
|   | sounds on   |
|   | day of      |
|   | discharge   |
|   | but         |
|   | otherwise   |
|   | hypoxia     |
|   | resolved    |
|   | back to     |
|   | baseline    |
|   | and pt      |
|   | clinically  |
|   | improved.   |
|   | Pt was      |
|   | initially   |
|   | requiring   |
|   | 4L during   |
 
|   | the day.    |
|   | On day of   |
|   | discharge,  |
|   | pt on RA    |
|   | during the  |
|   | day and 2L  |
|   | at night.   |
|   | Pt can      |
|   | return to   |
|   | adult       |
|   | family      |
|   | home.Past   |
|   | Medical     |
|   | History     |
|   | Diagnosis   |
|   | Date        |
|   |   Asthma    |
|   | with COPD   |
|   | (chronic    |
|   | obstructive |
|   |  pulmonary  |
|   | disease)    |
|   | (HCC)       |
|   | 2018   |
|   |             |
|   |   Back      |
|   | injury      |
|   |   Bleeding  |
|   | ulcer       |
|   |   COPD      |
|   | (chronic    |
|   | obstructive |
|   |  pulmonary  |
|   | disease)    |
|   | (HCC)       |
|   |   Head      |
|   | injury      |
|   |   EMELINA       |
|   | (obstructiv |
|   | e sleep     |
|   | apnea)      |
|   | 2018   |
|   |             |
|   |   Other     |
|   | chronic     |
|   | pain        |
|   |             |
|   | Schizoaffec |
|   | tive        |
|   | disorder    |
|   | (HCC)       |
|   | History     |
|   | reviewed.   |
|   | No          |
|   | pertinent   |
|   | surgical    |
|   | history.All |
|   | ergies      |
|   | Allergen    |
|   | Reactions   |
 
|   |             |
|   |   Peanut    |
|   | Oil         |
|   | Shortness   |
|   | of Breath   |
|   | and Hives   |
|   | Prescriptio |
|   | ns Prior to |
|   |  Admission  |
|   | Medication  |
|   | Sig         |
|   | Dispense    |
|   | Refill Last |
|   |  Dose       |
|   |             |
|   | Acetaminoph |
|   | en 500 MG   |
|   | coapsule    |
|   | Take 1,000  |
|   | mg by mouth |
|   |  4 (four)   |
|   | times       |
|   | daily.      |
|   | 2018@1 |
|   | 200         |
|   |   albuterol |
|   |  (PROVENTIL |
|   |             |
|   | HFA;VENTOLI |
|   | N HFA) 108  |
|   | (90 Base)   |
|   | MCG/ACT     |
|   | inhaler     |
|   | Inhale 2    |
|   | puffs into  |
|   | the lungs   |
|   | every 4     |
|   | (four)      |
|   | hours as    |
|   | needed for  |
|   | Wheezing.   |
|   |  2018  |
|   |             |
|   |             |
|   | budesonide  |
|   | (PULMICORT) |
|   |  0.5 MG/2ML |
|   |  nebulizer  |
|   | suspension  |
|   | Take 0.5 mg |
|   |  by         |
|   | nebulizatio |
|   | n 2 (two)   |
|   | times       |
|   | daily.      |
|   | 2018@0 |
|   | 800         |
|   |   buPROPion |
|   |             |
|   | (WELLBUTRIN |
 
|   |  XL) 300 MG |
|   |  24 hr      |
|   | tablet Take |
|   |  300 mg by  |
|   | mouth every |
|   |  morning.   |
|   |             |
|   | 2018@0 |
|   | 800         |
|   |             |
|   | dextrometho |
|   | rphan       |
|   | polistirex  |
|   | (DELSYM) 30 |
|   |  MG/5ML ER  |
|   | suspension  |
|   | Take 60 mg  |
|   | by mouth    |
|   | every 12    |
|   | (twelve)    |
|   | hours as    |
|   | needed for  |
|   | Cough.      |
|   | 2018@P |
|   | M           |
|   |             |
|   | ergocalcife |
|   | rol         |
|   | (DRISDOL)   |
|   | 34496 units |
|   |  capsule    |
|   | Take 50,000 |
|   |  Units by   |
|   | mouth once  |
|   | a week.     |
|   | 2018   |
|   |             |
|   |             |
|   | furosemide  |
|   | (LASIX) 20  |
|   | MG tablet   |
|   | Take 20 mg  |
|   | by mouth    |
|   | daily.      |
|   | 2018@A |
|   | M           |
|   |             |
|   | guaifenesin |
|   |  (MUCINEX   |
|   | MAX) 1200   |
|   | MG 12hr     |
|   | tablet Take |
|   |  1,200 mg   |
|   | by mouth    |
|   | every 6     |
|   | (six) hours |
|   |  as needed. |
|   |             |
|   | 2018@P |
|   | M           |
 
|   |             |
|   | L-methylfol |
|   | ate         |
|   | (DEPLIN) 15 |
|   |  MG tablet  |
|   | Take 15 mg  |
|   | by mouth    |
|   | daily with  |
|   | breakfast.  |
|   |             |
|   | 2018@0 |
|   | 800         |
|   |             |
|   | lurasidone  |
|   | (LATUDA) 40 |
|   |  MG tablet  |
|   | Take 40 mg  |
|   | by mouth    |
|   | daily.      |
|   | 2018@1 |
|   | 700         |
|   |             |
|   | Methylcobal |
|   | amin        |
|   | (METHYL     |
|   | B-12 PO)    |
|   | Take 5,000  |
|   | mcg by      |
|   | mouth 2     |
|   | (two) times |
|   |  daily.     |
|   | 2018@0 |
|   | 800         |
|   |             |
|   | OLANZapine  |
|   | (ZYPREXA)   |
|   | 20 MG       |
|   | tablet Take |
|   |  10 mg by   |
|   | mouth 2     |
|   | (two) times |
|   |  daily.     |
|   | 2018@0 |
|   | 800         |
|   |             |
|   | omeprazole  |
|   | (PRILOSEC)  |
|   | 20 MG       |
|   | capsule     |
|   | Take 20 mg  |
|   | by mouth 2  |
|   | (two) times |
|   |  daily.     |
|   | 2018@A |
|   | M           |
|   |   oxycodone |
|   |  (OXY-IR) 5 |
|   |  MG capsule |
|   |  Take 1     |
|   | capsule by  |
 
|   | mouth every |
|   |  6 (six)    |
|   | hours as    |
|   | needed. 30  |
|   | capsule 0 2 |
|   |  weeks ago  |
|   |             |
|   |             |
|   | polyethylen |
|   | e glycol    |
|   | (GLYCOLAX)  |
|   | packet Take |
|   |  17 g by    |
|   | mouth       |
|   | daily.      |
|   | 2018@0 |
|   | 800         |
|   |             |
|   | propranolol |
|   |  (INDERAL)  |
|   | 20 MG       |
|   | tablet Take |
|   |  20 mg by   |
|   | mouth 2     |
|   | (two) times |
|   |  daily.     |
|   | 2018@0 |
|   | 800         |
|   |             |
|   | sertraline  |
|   | (ZOLOFT)    |
|   | 100 MG      |
|   | tablet Take |
|   |   mg  |
|   | by mouth    |
|   | See Admin   |
|   | Instruction |
|   | s. Take 100 |
|   |  mg by      |
|   | mouth every |
|   |  morning    |
|   | and 50 mg   |
|   | by mouth in |
|   |  the        |
|   | afternoon   |
|   |             |
|   | 2018@0 |
|   | 800         |
|   |             |
|   | simvastatin |
|   |  (ZOCOR) 40 |
|   |  MG tablet  |
|   | Take 40 mg  |
|   | by mouth    |
|   | nightly.    |
|   | 2018@P |
|   | M           |
|   |             |
|   | topiramate  |
|   | (TOPAMAX)   |
 
|   | 25 MG       |
|   | tablet Take |
|   |  25 mg by   |
|   | mouth 2     |
|   | (two) times |
|   |  daily.     |
|   | 2018@0 |
|   | 700         |
|   |             |
|   | umeclidiniu |
|   | m-vilantero |
|   | l (ANORO    |
|   | ELLIPTA)    |
|   | 62.5-25     |
|   | MCG/INH     |
|   | inhaler     |
|   | Inhale 1    |
|   | puff into   |
|   | the lungs   |
|   | daily. 1    |
|   | each 11     |
|   | 2018@0 |
|   | 800         |
|   | DISCHARGE   |
|   | EXAMVital   |
|   | Signs:BP    |
|   |  (BP  |
|   | Location:   |
|   | Left upper  |
|   | arm)  |     |
|   | Pulse 71  | |
|   |  Temp 98.8  |
|   |   F (37.1   |
|   |   C) (Oral) |
|   |   | Resp 20 |
|   |   | Ht      |
|   | 1.753 m (5' |
|   |  9")  | Wt  |
|   | 99.7 kg     |
|   | (219 lb     |
|   | 12.8 oz)  | |
|   |  SpO2 91%   |
|   | | BMI 32.46 |
|   |  kg/m       |
|   | Temp:       |
|   | [97.7   F   |
|   | (36.5       |
|   |   C)-98.8   |
|   |   F (37.1   |
|   |   C)] 98.8  |
|   |   F (37.1   |
|   |   C) ( |
|   |  1230)BP:   |
|   | (101-122)/( |
|   | 64-75)      |
|   | 111/68      |
|   | (      |
|   | 1230)Heart  |
|   | Rate:       |
|   | [68-87] 71  |
 
|   | (      |
|   | 1230)Resp:  |
|   |  [16-20] 20 |
|   |  (     |
|   | 1230)SpO2:  |
|   |  [91 %-95   |
|   | %] 91 %     |
|   | (      |
|   | 1230)Weight |
|   | :  [99.7 kg |
|   |  (219 lb    |
|   | 12.8 oz)]   |
|   | 99.7 kg     |
|   | (219 lb     |
|   | 12.8 oz)    |
|   | (      |
|   | 0258)Physic |
|   | al Exam     |
|   | Constitutio |
|   | nal: He is  |
|   | oriented to |
|   |  person,    |
|   | place, and  |
|   | time. He    |
|   | appears     |
|   | well-develo |
|   | ped and     |
|   | well-nouris |
|   | hed. No     |
|   | distress.   |
|   | HENT: Head: |
|   |             |
|   | Normocephal |
|   | ic and      |
|   | atraumatic. |
|   |  Eyes:      |
|   | Pupils are  |
|   | equal,      |
|   | round, and  |
|   | reactive to |
|   |  light. EOM |
|   |  are        |
|   | normal.     |
|   | Cardiovascu |
|   | lar: Normal |
|   |  rate,      |
|   | regular     |
|   | rhythm and  |
|   | normal      |
|   | heart       |
|   | sounds.  No |
|   |  murmur     |
|   | heard.Pulmo |
|   | nary/Chest: |
|   |  No         |
|   | respiratory |
|   |  distress.  |
|   | He has no   |
|   | wheezes.    |
|   | Coarse      |
 
|   | breath      |
|   | sounds at   |
|   | bases but   |
|   | with        |
|   | adequate    |
|   | air         |
|   | exchange    |
|   | Abdomina/Gl |
|   | : Soft.     |
|   | Bowel       |
|   | sounds are  |
|   | normal. He  |
|   | exhibits no |
|   |             |
|   | distension. |
|   |  There is   |
|   | no          |
|   | tenderness. |
|   |             |
|   | Musculoskel |
|   | etal:       |
|   | Normal      |
|   | range of    |
|   | motion. He  |
|   | exhibits no |
|   |  edema.     |
|   | Neurologica |
|   | l: He is    |
|   | alert and   |
|   | oriented to |
|   |  person,    |
|   | place, and  |
|   | time. Skin: |
|   |  Skin is    |
|   | warm and    |
|   | dry.        |
|   | DATACBC:    |
|   | Lab Results |
|   |  Component  |
|   | Value Date  |
|   |  WBC 11.28  |
|   | (H)         |
|   | 2018  |
|   |  RBC 4.04   |
|   | (L)         |
|   | 2018  |
|   |  HGB 13.1   |
|   | (L)         |
|   | 2018  |
|   |  HCT 38.8   |
|   | (L)         |
|   | 2018  |
|   |  MCV 96.1   |
|   | 2018  |
|   |  MCH 32.3   |
|   | 2018  |
|   |  MCHC 33.6  |
|   | 2018  |
|   |  RDW 48.6   |
|   | 2018  |
 
|   |      |
|   | 2018  |
|   |  MPV 9.9    |
|   | 2018  |
|   |  DIFFTYPE   |
|   | AUTOMATED   |
|   | 2018  |
|   | CMP:  Lab   |
|   | Results     |
|   | Component   |
|   | Value Date  |
|   |       |
|   | 2018  |
|   |  K 3.4 (L)  |
|   | 2018  |
|   |       |
|   | 2018  |
|   |  CO2 26     |
|   | 2018  |
|   |  ANIONGAP   |
|   | 14          |
|   | 2018  |
|   |  GLUF 106   |
|   | (H)         |
|   | 2018  |
|   |  BUN 17     |
|   | 2018  |
|   |  CREATININE |
|   |  0.8        |
|   | 2018  |
|   |  BCR 21     |
|   | 2018  |
|   |  CA 8.8     |
|   | 2018  |
|   |  PROT 6.8   |
|   | 2018  |
|   |  ALB 2.8    |
|   | (L)         |
|   | 2018  |
|   |  GLOB 4.0   |
|   | 2018  |
|   |  BILITOT    |
|   | 0.2         |
|   | 2018  |
|   |  ALP 76     |
|   | 2018  |
|   |  AST 20     |
|   | 2018  |
|   |  ALT 26     |
|   | 2018  |
|   |  EGFR >60   |
|   | 2018  |
|   | Magnesium:  |
|   |  Lab        |
|   | Results     |
|   | Component   |
|   | Value Date  |
|   |  MG 2.3     |
|   | 2018  |
|   | Phosphorus: |
 
|   |   Lab       |
|   | Results     |
|   | Component   |
|   | Value Date  |
|   |  PHOS 2.1   |
|   | (L)         |
|   | 2018  |
|   | Last 3      |
|   | Troponin:   |
|   | Lab Results |
|   |  Component  |
|   | Value Date  |
|   |  TROPONINI  |
|   | <0.020      |
|   | 2018  |
|   |  TROPONINI  |
|   | <0.020      |
|   | 2018  |
|   |  TROPONINI  |
|   | <0.020      |
|   | 2018  |
|   | Results     |
|   | Procedure   |
|   | Component   |
|   | Value Units |
|   |  Date/Time  |
|   |  Sputum     |
|   | culture     |
|   | [[id number]]  |
|   | Collected:  |
|   |  18   |
|   | 0800        |
|   | Specimen:   |
|   | Sputum from |
|   |  Sputum     |
|   | Updated:    |
|   | 18    |
|   | 1513        |
|   | Specimen    |
|   | Description |
|   |  SPUTUM     |
|   | GRAM STAIN  |
|   | GREATER     |
|   | THAN 10     |
|   | SEC/LPF     |
|   | GRAM STAIN  |
|   | LESS THAN   |
|   | 10 WBCS/LPF |
|   |    GRAM     |
|   | STAIN 3+    |
|   | GRAM STAIN  |
|   | GRAM        |
|   | POSITIVE    |
|   | COCCI       |
|   | CULTURE     |
|   | SMEAR       |
|   | CONTAINS    |
|   | GREATER     |
|   | THAN 10     |
|   | SEC/LPF     |
 
|   | SUGGESTIVE  |
|   | OF POOR     |
|   | QUALITY     |
|   | SPECIMEN.   |
|   | SPECIMEN    |
|   | WILL NOT BE |
|   |  CULTURED   |
|   | OR WILL BE  |
|   | CULTURED BY |
|   |  SPECIAL    |
|   | REQUEST     |
|   | ONLY.       |
|   | PLEASE      |
|   | RECOLLECT   |
|   | IF          |
|   | CLINICALLY  |
|   | INDICATED.  |
|   |  SPECIMEN   |
|   | WILL BE     |
|   | HELD 48     |
|   | HOURS.      |
|   | Respiratory |
|   |  Filmarray  |
|   | [[id number]]  |
|   | Collected:  |
|   |  18   |
|   | 2234        |
|   | Specimen:   |
|   | Nasopharyng |
|   | eal Culture |
|   |  Updated:   |
|   | 18    |
|   | 0337        |
|   | ADENOVIRUS  |
|   | Not         |
|   | Detected    |
|   | CORONAVIRUS |
|   |  229E Not   |
|   | Detected    |
|   | CORONAVIRUS |
|   |  HKU1 Not   |
|   | Detected    |
|   | CORONAVIRUS |
|   |  NL63 Not   |
|   | Detected    |
|   | CORONAVIRUS |
|   |  OC43 Not   |
|   | Detected    |
|   | HUMAN       |
|   | METAPNEUMOV |
|   | IRUS Not    |
|   | Detected    |
|   | HUMAN       |
|   | RHINO/ENTER |
|   | O Not       |
|   | Detected    |
|   | INFLUENZA A |
|   |  Not        |
|   | Detected    |
|   | INFLUENZA B |
 
|   |  Not        |
|   | Detected    |
|   | PARAINFLUEN |
|   | ZA 1 Not    |
|   | Detected    |
|   | PARAINFLUEN |
|   | ZA 2 Not    |
|   | Detected    |
|   | PARAINFLUEN |
|   | ZA 3 Not    |
|   | Detected    |
|   | PARAINFLUEN |
|   | ZA 4 Not    |
|   | Detected    |
|   | RESP        |
|   | SYNCYTIAL   |
|   | VIRUS Not   |
|   | Detected    |
|   | BORDETELLA  |
|   | PERTUSSIS   |
|   | Not         |
|   | Detected    |
|   | CHLAMYDIAE  |
|   | PNEUMONIAE  |
|   | Not         |
|   | Detected    |
|   | MYCOPLASMA  |
|   | PNEUMONIAE  |
|   | Not         |
|   | Detected    |
|   | RESP PANEL  |
|   | INTERP      |
|   | Testing     |
|   | performed   |
|   | by          |
|   | Molecular   |
|   | Methodology |
|   |   PLAN1.    |
|   | Return to   |
|   | [facility].       |
|   | Complete    |
|   | antibiotics |
|   | Disposition |
|   | :           |
|   | [facility]onditio |
|   | n:          |
|   | Improved/go |
|   | odCode      |
|   | Status:     |
|   | DNR/DNINo   |
|   | discharge   |
|   | procedures  |
|   | on          |
|   | file.Follow |
|   |  up:Per Pt  |
|   | None        |
|   | Medication  |
|   | List  START |
|   |  taking     |
|   | these       |
 
|   | medications |
|   |             |
|   | ipratropium |
|   | -albuterol  |
|   | 0.5-2.5     |
|   | mg/3mLQTY:  |
|   |  360        |
|   | mLRefills:  |
|   |  0Commonly  |
|   | known as:   |
|   | DUO-NEBEver |
|   | y 4 hours x |
|   |  5 days     |
|   | then Q4H    |
|   | prn for SOB |
|   |  or         |
|   | wheezing    |
|   | levofloxaci |
|   | n 750 MG    |
|   | tabletQTY:  |
|   |  5          |
|   | tabletRefil |
|   | ls:         |
|   | 0Commonly   |
|   | known as:   |
|   | LEVAQUINTak |
|   | e 1 tablet  |
|   | by mouth    |
|   | daily.      |
|   | predniSONE  |
|   | 20 MG       |
|   | tabletQTY:  |
|   |  6          |
|   | tabletRefil |
|   | ls:         |
|   | 0Commonly   |
|   | known as:   |
|   | DELTASONETa |
|   | ke 2 tabs x |
|   |  3 days     |
|   | then stop   |
|   | CONTINUE    |
|   | taking      |
|   | these       |
|   | medications |
|   |             |
|   | Acetaminoph |
|   | en 500 MG   |
|   | coapsuleRef |
|   | ills:  0    |
|   | albuterol   |
|   | 108 (90     |
|   | Base)       |
|   | MCG/ACT     |
|   | inhalerRefi |
|   | lls:        |
|   | 0Commonly   |
|   | known as:   |
|   | PROVENTIL   |
|   | HFA;VENTOLI |
 
|   | N HFA       |
|   | budesonide  |
|   | 0.5 MG/2ML  |
|   | nebulizer   |
|   | suspensionR |
|   | efills:     |
|   | 0Commonly   |
|   | known as:   |
|   | PULMICORT   |
|   | buPROPion   |
|   | 300 MG 24   |
|   | hr          |
|   | tabletRefil |
|   | ls:         |
|   | 0Commonly   |
|   | known as:   |
|   | WELLBUTRIN  |
|   | XL          |
|   | dextrometho |
|   | rphan       |
|   | polistirex  |
|   | 30 MG/5ML   |
|   | ER          |
|   | suspensionR |
|   | efills:     |
|   | 0Commonly   |
|   | known as:   |
|   | DELSYM      |
|   | ergocalcife |
|   | rol [id number]   |
|   | units       |
|   | capsuleRefi |
|   | lls:        |
|   | 0Commonly   |
|   | known as:   |
|   | DRISDOL     |
|   | furosemide  |
|   | 20 MG       |
|   | tabletRefil |
|   | ls:         |
|   | 0Commonly   |
|   | known as:   |
|   | LASIX       |
|   | guaifenesin |
|   |  1200 MG    |
|   | 12hr        |
|   | tabletRefil |
|   | ls:         |
|   | 0Commonly   |
|   | known as:   |
|   | MUCINEX MAX |
|   |             |
|   | L-methylfol |
|   | ate 15 MG   |
|   | tabletRefil |
|   | ls:         |
|   | 0Commonly   |
|   | known as:   |
|   | DEPLIN      |
|   | LATUDA 40   |
 
|   | MG          |
|   | tabletRefil |
|   | ls:         |
|   | 0Generic    |
|   | drug:       |
|   | lurasidone  |
|   | METHYL B-12 |
|   |  PORefills: |
|   |   0         |
|   | OLANZapine  |
|   | 20 MG       |
|   | tabletRefil |
|   | ls:         |
|   | 0Commonly   |
|   | known as:   |
|   | ZyPREXA     |
|   | omeprazole  |
|   | 20 MG       |
|   | capsuleRefi |
|   | lls:        |
|   | 0Commonly   |
|   | known as:   |
|   | PRILOSEC    |
|   | oxycodone 5 |
|   |  MG         |
|   | capsuleQTY: |
|   |   30        |
|   | capsuleRefi |
|   | lls:        |
|   | 0Commonly   |
|   | known as:   |
|   | OXY-IRTake  |
|   | 1 capsule   |
|   | by mouth    |
|   | every 6     |
|   | (six) hours |
|   |  as needed. |
|   |             |
|   | polyethylen |
|   | e glycol    |
|   | packetRefil |
|   | ls:         |
|   | 0Commonly   |
|   | known as:   |
|   | GLYCOLAX    |
|   | propranolol |
|   |  20 MG      |
|   | tabletRefil |
|   | ls:         |
|   | 0Commonly   |
|   | known as:   |
|   | INDERAL     |
|   | sertraline  |
|   | 100 MG      |
|   | tabletRefil |
|   | ls:         |
|   | 0Commonly   |
|   | known as:   |
|   | ZOLOFT      |
|   | simvastatin |
 
|   |  40 MG      |
|   | tabletRefil |
|   | ls:         |
|   | 0Commonly   |
|   | known as:   |
|   | ZOCOR       |
|   | topiramate  |
|   | 25 MG       |
|   | tabletRefil |
|   | ls:         |
|   | 0Commonly   |
|   | known as:   |
|   | TOPAMAX     |
|   | umeclidiniu |
|   | m-vilantero |
|   | l 62.5-25   |
|   | MCG/INH     |
|   | inhalerQTY: |
|   |   1         |
|   | eachRefills |
|   | :  11For    |
|   | diagnoses:  |
|   |  Asthma     |
|   | with COPD   |
|   | (chronic    |
|   | obstructive |
|   |  pulmonary  |
|   | disease)Com |
|   | monly known |
|   |  as:  ANORO |
|   |             |
|   | ELLIPTAInha |
|   | le 1 puff   |
|   | into the    |
|   | lungs       |
|   | daily.  You |
|   |  might also |
|   |  be taking  |
|   | other       |
|   | medications |
|   |  not listed |
|   |  above. If  |
|   | you have    |
|   | questions   |
|   | about any   |
|   | of your     |
|   | other       |
|   | medications |
|   | , talk to   |
|   | the person  |
|   | who         |
|   | prescribed  |
|   | them or     |
|   | your        |
|   | Primary     |
|   | Care        |
|   | Provider.   |
|   |    Where to |
|   |  Get Your   |
|   | Medications |
 
|   |   These     |
|   | medications |
|   |  were sent  |
|   | to          |
|   | [facility]   |
|   [facility] |
|   |  -          |
|   | [place],  |
|   | [place] - [address]    |
|   | [address]   |
|   | [address]  [address]
|   |  [address]  |
|   | [address],     |
|   | [place]   |
|   | [place] [id number]    |
|   | Phone:      |
|   | [phone] |
|   | 2           |
|   | levofloxaci |
|   | n 750 MG    |
|   | tablet You  |
|   | can get     |
|   | these       |
|   | medications |
|   |  from any   |
|   | pharmacy    |
|   | Bring a     |
|   | paper       |
|   | prescriptio |
|   | n for each  |
|   | of these    |
|   | medications |
|   |             |
|   | ipratropium |
|   | -albuterol  |
|   | 0.5-2.5     |
|   | mg/3mL      |
|   | predniSONE  |
|   | 20 MG       |
|   | tablet      |
|   | Discharge   |
|   | took >30    |
|   | minutes, to |
|   |  include    |
|   | final       |
|   | examination |
|   | ,           |
|   | discussion  |
|   | of          |
|   | admission,  |
|   | and         |
|   | preparation |
|   |  of         |
|   | prescriptio |
|   | ns,         |
|   | instruction |
|   | s for       |
|   | on-going    |
|   | care,       |
|   | follow-up   |
 
|   | and         |
|   | documentati |
|   | on of       |
|   | discharge   |
|   | summary.Meg |
|   | h Yajaira,      |
|   | [id number] |
+---+-------------+
 
 
 
+--------+-----------+---+----------------------+----------------------+
| / | Office    |   |   Ben,          | Asthma with COPD     |
|    | Visit     |   | Edwina Mckeon,   | (chronic obstructive |
|        |           |   | MD                   |  pulmonary disease)  |
|        |           |   |                      | (HCC) (Primary Dx);  |
|        |           |   |                      | Nocturnal hypoxemia; |
|        |           |   |                      |  Multiple pulmonary  |
|        |           |   |                      | nodules; EMELINA         |
|        |           |   |                      | (obstructive sleep   |
|        |           |   |                      | apnea); Personal     |
|        |           |   |                      | history of tobacco   |
|        |           |   |                      | use, presenting      |
|        |           |   |                      | hazards to health    |
+--------+-----------+---+----------------------+----------------------+
| / | Emergency |   |   Candido Rainey DO | Lymphedema (Primary  |
|    |           |   |                      | Dx); Generalized     |
|        |           |   |                      | weakness             |
+--------+-----------+---+----------------------+----------------------+
 from Last 3 Months
 
 Immunizations
 
 
+-----------------+------------------------+----------+
| Name            | Dates Previously Given | Next Due |
+-----------------+------------------------+----------+
| Pneumococcal    | 2010             |          |
| Polysaccharide  |                        |          |
| 23-valent       |                        |          |
+-----------------+------------------------+----------+
 
 
 
 Family History
 
 
+---------------------+----------+------+----------+
| Medical History     | Relation | Name | Comments |
+---------------------+----------+------+----------+
| Heart attack        | Father   |      |          |
+---------------------+----------+------+----------+
| Alzheimer's disease | Mother   |      |          |
+---------------------+----------+------+----------+
| Cancer              | Mother   |      |          |
+---------------------+----------+------+----------+
 
 
 
+----------+------+----------+----------+
 
| Relation | Name | Status   | Comments |
+----------+------+----------+----------+
| Father   |      |  |          |
+----------+------+----------+----------+
| Mother   |      |  |          |
+----------+------+----------+----------+
 
 
 
 Social History
 
 
+---------------+-------+-----------+--------+------------------+
| Tobacco Use   | Types | Packs/Day | Years  | Date             |
|               |       |           | Used   |                  |
+---------------+-------+-----------+--------+------------------+
| Former Smoker |       | 3         | 20     | Quit: 1998 |
+---------------+-------+-----------+--------+------------------+
 
 
 
+---------------------+---+---+---+
| Smokeless Tobacco:  |   |   |   |
| Never Used          |   |   |   |
+---------------------+---+---+---+
 
 
 
+-----------------------------------------+
| Tobacco Cessation: Counseling Given: No |
+-----------------------------------------+
 
 
 
+-------------+-----------+---------+----------+
| Alcohol Use | Drinks/We | oz/Week | Comments |
|             | ek        |         |          |
+-------------+-----------+---------+----------+
| No          |           |         |          |
+-------------+-----------+---------+----------+
 
 
 
+------------------+---------------+
| Sex Assigned at  | Date Recorded |
| Birth            |               |
+------------------+---------------+
| Not on file      |               |
+------------------+---------------+
 
 
 
 Last Filed Vital Signs
 
 
+-------------------+----------------------+-------------------------+
| Vital Sign        | Reading              | Time Taken              |
+-------------------+----------------------+-------------------------+
| Blood Pressure    | 128/80               | 2018  4:32 PM PDT |
+-------------------+----------------------+-------------------------+
 
| Pulse             | 88                   | 2018  4:32 PM PDT |
+-------------------+----------------------+-------------------------+
| Temperature       | 36.7   C (98   F)    | 2018  3:10 PM PDT |
+-------------------+----------------------+-------------------------+
| Respiratory Rate  | 20                   | 2018  4:32 PM PDT |
+-------------------+----------------------+-------------------------+
| Oxygen Saturation | 96%                  | 2018  4:32 PM PDT |
+-------------------+----------------------+-------------------------+
| Inhaled Oxygen    | -                    | -                       |
| Concentration     |                      |                         |
+-------------------+----------------------+-------------------------+
| Weight            | 98.9 kg (218 lb 0.6  | 2018  2:02 PM PDT |
|                   | oz)                  |                         |
+-------------------+----------------------+-------------------------+
| Height            | 175.3 cm (5' 9")     | 2018  8:00 PM PDT |
+-------------------+----------------------+-------------------------+
| Body Mass Index   | 32.2                 | 2018  2:02 PM PDT |
+-------------------+----------------------+-------------------------+
 
 
 
 Plan of Treatment
 
 
+--------+---------+-----------+----------------------+-------------+
| Date   | Type    | Specialty | Care Team            | Description |
+--------+---------+-----------+----------------------+-------------+
| 10/19/ | Office  |           |   Ben,          |             |
| 2018   | Visit   |           | Edwina Mckeon,   |             |
|        |         |           | MD [address] |             |
|        |         |           [place] [address] |             |
|        |         |           |   [phone]       |             |
|        |         |           | [phone] (Fax)   |             |
+--------+---------+-----------+----------------------+-------------+
 
 
 
 Procedures
 
 
+----------------------+--------+-------------+----------------------+----------------------
+
| Procedure Name       | Priori | Date/Time   | Associated Diagnosis | Comments             
|
|                      | ty     |             |                      |                      
|
+----------------------+--------+-------------+----------------------+----------------------
+
| XR LUMBAR SPINE      | ASAP   | 2018  |                      |   Results for this   
|
| LIMITED 2-3 VIEW     |        |  3:10 PM    |                      | procedure are in the 
|
|                      |        | PDT         |                      |  results section.    
|
+----------------------+--------+-------------+----------------------+----------------------
+
| XR CHEST 2 VIEW      | ASAP   | 2018  |                      |   Results for this   
|
| FRONTAL AND LATERAL  |        |  3:09 PM    |                      | procedure are in the 
|
 
|                      |        | PDT         |                      |  results section.    
|
+----------------------+--------+-------------+----------------------+----------------------
+
| XR SACRUM AND COCCYX | ASAP   | 2018  |                      |   Results for this   
|
|                      |        |  3:09 PM    |                      | procedure are in the 
|
|                      |        | PDT         |                      |  results section.    
|
+----------------------+--------+-------------+----------------------+----------------------
+
| URINALYSIS (REFLEX   | STAT   | 2018  |                      |   Results for this   
|
| TO                   |        |  2:48 PM    |                      | procedure are in the 
|
| MICROSCOPIC/REFLEX   |        | PDT         |                      |  results section.    
|
| TO CULTURE)          |        |             |                      |                      
|
+----------------------+--------+-------------+----------------------+----------------------
+
| BLOOD CULTURE, SET 2 | STAT   | 2018  |                      |                      
|
|                      |        |  2:42 PM    |                      |                      
|
|                      |        | PDT         |                      |                      
|
+----------------------+--------+-------------+----------------------+----------------------
+
| TROPONIN I           | STAT   | 2018  |                      |   Results for this   
|
|                      |        |  2:35 PM    |                      | procedure are in the 
|
|                      |        | PDT         |                      |  results section.    
|
+----------------------+--------+-------------+----------------------+----------------------
+
| [facility] SEPTIC LACTIC   | STAT   | 2018  |                      |   Results for this   
|
| ACID                 |        |  2:35 PM    |                      | procedure are in the 
|
|                      |        | PDT         |                      |  results section.    
|
+----------------------+--------+-------------+----------------------+----------------------
+
| COMPREHENSIVE        | STAT   | 2018  |                      |   Results for this   
|
| METABOLIC PANEL      |        |  2:35 PM    |                      | procedure are in the 
|
|                      |        | PDT         |                      |  results section.    
|
+----------------------+--------+-------------+----------------------+----------------------
+
| CBC W/AUTO DIFF      | STAT   | 2018  |                      |   Results for this   
|
| (REFLEX TO MANUAL)   |        |  2:35 PM    |                      | procedure are in the 
|
|                      |        | PDT         |                      |  results section.    
|
 
+----------------------+--------+-------------+----------------------+----------------------
+
| BLOOD CULTURE, SET 1 | STAT   | 2018  |                      |                      
|
|                      |        |  2:35 PM    |                      |                      
|
|                      |        | PDT         |                      |                      
|
+----------------------+--------+-------------+----------------------+----------------------
+
| EKG 12 LEAD UNIT     | Routin | 2018  |                      |   Results for this   
|
| PERFORMED            | e      |  2:33 PM    |                      | procedure are in the 
|
|                      |        | PDT         |                      |  results section.    
|
+----------------------+--------+-------------+----------------------+----------------------
+
| NEBULIZER/INHALATION | STAT   | 2018  |                      |                      
|
|  TREATMENT           |        |  2:30 PM    |                      |                      
|
|                      |        | PDT         |                      |                      
|
+----------------------+--------+-------------+----------------------+----------------------
+
| ED INFORMATION       | Routin | 2018  |                      |   Results for this   
|
| EXCHANGE             | e      |  1:59 PM    |                      | procedure are in the 
|
|                      |        | PDT         |                      |  results section.    
|
+----------------------+--------+-------------+----------------------+----------------------
+
| PROCALCITONIN        | Routin | 2018  |                      |   Results for this   
|
|                      | e - AM |  4:25 AM    |                      | procedure are in the 
|
|                      |        | PDT         |                      |  results section.    
|
+----------------------+--------+-------------+----------------------+----------------------
+
| BASIC METABOLIC      | Routin | 2018  |                      |   Results for this   
|
| PANEL                | e - AM |  4:25 AM    |                      | procedure are in the 
|
|                      |        | PDT         |                      |  results section.    
|
+----------------------+--------+-------------+----------------------+----------------------
+
| CBC W/AUTO DIFF      | Routin | 2018  |                      |   Results for this   
|
| (REFLEX TO MANUAL)   | e      |  4:25 AM    |                      | procedure are in the 
|
|                      |        | PDT         |                      |  results section.    
|
+----------------------+--------+-------------+----------------------+----------------------
+
| CBC W/AUTO DIFF      | Routin | 2018  |                      |   Results for this   
|
 
| (REFLEX TO MANUAL)   | e      |  4:20 AM    |                      | procedure are in the 
|
|                      |        | PDT         |                      |  results section.    
|
+----------------------+--------+-------------+----------------------+----------------------
+
| COMPREHENSIVE        | Routin | 2018  |                      |   Results for this   
|
| METABOLIC PANEL      | e      |  4:20 AM    |                      | procedure are in the 
|
|                      |        | PDT         |                      |  results section.    
|
+----------------------+--------+-------------+----------------------+----------------------
+
| SPUTUM CULT W/ GRAM  | Timed  | 2018  |                      |   Results for this   
|
| STAIN                |        |  8:00 AM    |                      | procedure are in the 
|
|                      |        | PDT         |                      |  results section.    
|
+----------------------+--------+-------------+----------------------+----------------------
+
| CBC W/AUTO DIFF      | Routin | 2018  |                      |   Results for this   
|
| (REFLEX TO MANUAL)   | e      |  4:47 AM    |                      | procedure are in the 
|
|                      |        | PDT         |                      |  results section.    
|
+----------------------+--------+-------------+----------------------+----------------------
+
| COMPREHENSIVE        | Routin | 2018  |                      |   Results for this   
|
| METABOLIC PANEL      | e      |  4:47 AM    |                      | procedure are in the 
|
|                      |        | PDT         |                      |  results section.    
|
+----------------------+--------+-------------+----------------------+----------------------
+
| PHOSPHOROUS          | Routin | 2018  |                      |   Results for this   
|
|                      | e - AM |  4:47 AM    |                      | procedure are in the 
|
|                      |        | PDT         |                      |  results section.    
|
+----------------------+--------+-------------+----------------------+----------------------
+
| MAGNESIUM            | Routin | 2018  |                      |   Results for this   
|
|                      | e - AM |  4:47 AM    |                      | procedure are in the 
|
|                      |        | PDT         |                      |  results section.    
|
+----------------------+--------+-------------+----------------------+----------------------
+
| CTA CHEST PULMONARY  | STAT   | 2018  |                      |   Results for this   
|
| EMBOLISM W CONTRAST  |        | 11:44 PM    |                      | procedure are in the 
|
|                      |        | PDT         |                      |  results section.    
|
 
+----------------------+--------+-------------+----------------------+----------------------
+
| POC ARTERIAL BLOOD   | Routin | 2018  |                      |   Results for this   
|
| GAS                  | e      | 11:11 PM    |                      | procedure are in the 
|
|                      |        | PDT         |                      |  results section.    
|
+----------------------+--------+-------------+----------------------+----------------------
+
| RESPIRATORY          | Timed  | 2018  |                      |   Results for this   
|
| FILMARRAY            |        | 10:34 PM    |                      | procedure are in the 
|
|                      |        | PDT         |                      |  results section.    
|
+----------------------+--------+-------------+----------------------+----------------------
+
| US LOWER EXTREMITY   | STAT   | 2018  |                      |   Results for this   
|
| VENOUS DOPPLER BILAT |        |  9:50 PM    |                      | procedure are in the 
|
|                      |        | PDT         |                      |  results section.    
|
+----------------------+--------+-------------+----------------------+----------------------
+
| APTT                 | STAT   | 2018  |                      |   Results for this   
|
|                      |        |  8:09 PM    |                      | procedure are in the 
|
|                      |        | PDT         |                      |  results section.    
|
+----------------------+--------+-------------+----------------------+----------------------
+
| PROTIME-INR          | STAT   | 2018  |                      |   Results for this   
|
|                      |        |  8:09 PM    |                      | procedure are in the 
|
|                      |        | PDT         |                      |  results section.    
|
+----------------------+--------+-------------+----------------------+----------------------
+
| [facility] SEPTIC LACTIC   | STAT   | 2018  |                      |   Results for this   
|
| ACID                 |        |  8:09 PM    |                      | procedure are in the 
|
|                      |        | PDT         |                      |  results section.    
|
+----------------------+--------+-------------+----------------------+----------------------
+
| PROCALCITONIN        | Timed  | 2018  |                      |   Results for this   
|
|                      |        |  8:09 PM    |                      | procedure are in the 
|
|                      |        | PDT         |                      |  results section.    
|
+----------------------+--------+-------------+----------------------+----------------------
+
| D-DIMER,             | STAT   | 2018  |                      |   Results for this   
|
 
| QUANTITATIVE         |        |  8:09 PM    |                      | procedure are in the 
|
|                      |        | PDT         |                      |  results section.    
|
+----------------------+--------+-------------+----------------------+----------------------
+
| COMPREHENSIVE        | STAT   | 2018  |                      |   Results for this   
|
| METABOLIC PANEL      |        |  8:09 PM    |                      | procedure are in the 
|
|                      |        | PDT         |                      |  results section.    
|
+----------------------+--------+-------------+----------------------+----------------------
+
| TROPONIN I           | STAT   | 2018  |                      |   Results for this   
|
|                      |        |  8:09 PM    |                      | procedure are in the 
|
|                      |        | PDT         |                      |  results section.    
|
+----------------------+--------+-------------+----------------------+----------------------
+
| CK MB                | STAT   | 2018  |                      |   Results for this   
|
|                      |        |  8:09 PM    |                      | procedure are in the 
|
|                      |        | PDT         |                      |  results section.    
|
+----------------------+--------+-------------+----------------------+----------------------
+
| CK                   | STAT   | 2018  |                      |   Results for this   
|
|                      |        |  8:09 PM    |                      | procedure are in the 
|
|                      |        | PDT         |                      |  results section.    
|
+----------------------+--------+-------------+----------------------+----------------------
+
| POC ARTERIAL BLOOD   | Routin | 2018  |                      |   Results for this   
|
| GAS                  | e      |  7:41 PM    |                      | procedure are in the 
|
|                      |        | PDT         |                      |  results section.    
|
+----------------------+--------+-------------+----------------------+----------------------
+
| ABG DRAW             | STAT   | 2018  |                      |                      
|
|                      |        |  7:19 PM    |                      |                      
|
|                      |        | PDT         |                      |                      
|
+----------------------+--------+-------------+----------------------+----------------------
+
| XR CHEST 2 VIEW      | ASAP   | 2018  |                      |   Results for this   
|
| FRONTAL AND LATERAL  |        |  7:04 PM    |                      | procedure are in the 
|
|                      |        | PDT         |                      |  results section.    
|
 
+----------------------+--------+-------------+----------------------+----------------------
+
| APTT                 | STAT   | 2018  |                      |   Results for this   
|
|                      |        |  6:00 PM    |                      | procedure are in the 
|
|                      |        | PDT         |                      |  results section.    
|
+----------------------+--------+-------------+----------------------+----------------------
+
| PROTIME-INR          | STAT   | 2018  |                      |   Results for this   
|
|                      |        |  6:00 PM    |                      | procedure are in the 
|
|                      |        | PDT         |                      |  results section.    
|
+----------------------+--------+-------------+----------------------+----------------------
+
| BRAIN NATRIURETIC    | STAT   | 2018  |                      |   Results for this   
|
| PEPTIDE              |        |  5:40 PM    |                      | procedure are in the 
|
|                      |        | PDT         |                      |  results section.    
|
+----------------------+--------+-------------+----------------------+----------------------
+
| [facility] [place]   | STAT   | 2018  |                      |   Results for this   
|
| TRP (ED ONLY)        |        |  5:40 PM    |                      | procedure are in the 
|
|                      |        | PDT         |                      |  results section.    
|
+----------------------+--------+-------------+----------------------+----------------------
+
| EKG 12 LEAD UNIT     | STAT   | 2018  |                      |   Results for this   
|
| PERFORMED            |        |  5:31 PM    |                      | procedure are in the 
|
|                      |        | PDT         |                      |  results section.    
|
+----------------------+--------+-------------+----------------------+----------------------
+
| NEBULIZER/INHALATION | STAT   | 2018  |                      |                      
|
|  TREATMENT           |        |  5:22 PM    |                      |                      
|
|                      |        | PDT         |                      |                      
|
+----------------------+--------+-------------+----------------------+----------------------
+
| ED INFORMATION       | Routin | 2018  |                      |   Results for this   
|
| EXCHANGE             | e      |  3:51 PM    |                      | procedure are in the 
|
|                      |        | PDT         |                      |  results section.    
|
+----------------------+--------+-------------+----------------------+----------------------
+
| US LOWER EXTREMITY   | ASAP   | 2018  |                      |   Results for this   
|
 
| VENOUS DOPPLER RIGHT |        |  6:30 PM    |                      | procedure are in the 
|
|                      |        | PDT         |                      |  results section.    
|
+----------------------+--------+-------------+----------------------+----------------------
+
| XR CHEST 2 VIEW      | STAT   | 2018  |                      |   Results for this   
|
| FRONTAL AND LATERAL  |        |  5:29 PM    |                      | procedure are in the 
|
|                      |        | PDT         |                      |  results section.    
|
+----------------------+--------+-------------+----------------------+----------------------
+
| POC CARDIAC TROPONIN | Routin | 2018  |                      |   Results for this   
|
|                      | e      |  5:05 PM    |                      | procedure are in the 
|
|                      |        | PDT         |                      |  results section.    
|
+----------------------+--------+-------------+----------------------+----------------------
+
| BRAIN NATRIURETIC    | STAT   | 2018  |                      |   Results for this   
|
| PEPTIDE              |        |  4:50 PM    |                      | procedure are in the 
|
|                      |        | PDT         |                      |  results section.    
|
+----------------------+--------+-------------+----------------------+----------------------
+
| [facility] CARD PANEL W/O   | STAT   | 2018  |                      |   Results for this   
|
| TRP (ED ONLY)        |        |  4:50 PM    |                      | procedure are in the 
|
|                      |        | PDT         |                      |  results section.    
|
+----------------------+--------+-------------+----------------------+----------------------
+
| EKG 12 LEAD UNIT     | STAT   | 2018  |                      |   Results for this   
|
| PERFORMED            |        |  4:47 PM    |                      | procedure are in the 
|
|                      |        | PDT         |                      |  results section.    
|
+----------------------+--------+-------------+----------------------+----------------------
+
| URINALYSIS (REFLEX   | STAT   | 2018  |                      |   Results for this   
|
| TO                   |        |  4:01 PM    |                      | procedure are in the 
|
| MICROSCOPIC/REFLEX   |        | PDT         |                      |  results section.    
|
| TO CULTURE)          |        |             |                      |                      
|
+----------------------+--------+-------------+----------------------+----------------------
+
 from Last 3 Months
 
 Results
 XR Lumbar Spine 3 View (2018  3:10 PM)
 
 
+------------------------------------------------------------------------+--------------+
| Impressions                                                            | Performed At |
+------------------------------------------------------------------------+--------------+
|   1.    Lumbar spine: Mild compression of the superior endplate of L1  |   [facility]     |
| of approximately 20%, age indeterminate with mild spondylotic changes  | [facility]    |
| of the spine.  2.    No evidence of sacrococcygeal fracture.           |              |
| Electronically signed by Eddie Lerma MD on 2018 3:37 PM        |              |
+------------------------------------------------------------------------+--------------+
 
 
 
+------------------------------------------------------------------------+--------------+
| Narrative                                                              | Performed At |
+------------------------------------------------------------------------+--------------+
|   HISTORY:  Sacrococcygeal injury. Lumbar spine injury. Fall.          |   [facility]     |
| COMPARISON:  None.     TECHNIQUE:  AP, lateral films of the lumbar     | RADIOLOGY    |
| spine.  AP, angled AP, lateral films of the sacrum and coccyx.         |              |
| FINDINGS:  Lumbar spine:  Mild diffuse osteopenia. Compression of the  |              |
| superior endplate of L1 of approximately 10-20%, age indeterminate.    |              |
| Coil in the right paraspinal T12 level. Mild L5-S1, L4-L5 degenerative |              |
|  facet changes.     Sacrum:  SI joints unremarkable. No definable      |              |
| sacral or coccygeal fracture. Oval density in the low pelvis probably  |              |
| represents partially distended urinary bladder.                        |              |
+------------------------------------------------------------------------+--------------+
 
 
 
+-------------------------------------------------------------------------------------------
--------------------------------------------------------------------------------------------
---------------+
| Procedure Note                                                                            
                                                                                            
               |
+-------------------------------------------------------------------------------------------
--------------------------------------------------------------------------------------------
---------------+
|   EdilAgustin benton In - 2018  3:42 PM PDT  HISTORY:Sacrococcygeal injury. Lumbar    
                                                                                            
               |
| spine injury. Fall.COMPARISON:None.TECHNIQUE:AP, lateral films of the lumbar spine.AP,    
                                                                                            
               |
| angled AP, lateral films of the sacrum and coccyx.FINDINGS:Lumbar spine:Mild diffuse      
                                                                                            
               |
| osteopenia. Compression of the superior endplate of L1 of approximately 10-20%, age       
                                                                                            
               |
| indeterminate. Coil in the right paraspinal T12 level. Mild L5-S1, L4-L5 degenerative     
                                                                                            
               |
| facet changes.Sacrum:SI joints unremarkable. No definable sacral or coccygeal fracture.   
                                                                                            
               |
| Oval density in the low pelvis probably represents partially distended urinary            
                                                                                            
               |
| bladder.IMPRESSION:1.  Lumbar spine: Mild compression of the superior endplate of L1 of   
                                                                                            
 
               |
| approximately 20%, age indeterminate with mild spondylotic changes of the spine.2.  No    
                                                                                            
               |
| evidence of sacrococcygeal fracture.Electronically signed by Eddie Lerma MD on         
                                                                                            
               |
| 2018 3:37 PM                                                                         
                                                                                            
               |
|Lumbar spine:                                                                              
                                                                                            
               |
|Mild diffuse osteopenia. Compression of the superior endplate of L1 of approximately 10-20%
, age indeterminate. Coil in the right paraspinal T12 level. Mild L5-S1, L4-L5 degenerative 
facet changes. |
|                                                                                           
                                                                                            
               |
|Sacrum:                                                                                    
                                                                                            
              |
|SI joints unremarkable. No definable sacral or coccygeal fracture. Oval density in the low 
pelvis probably represents partially distended urinary bladder.                             
               |
|                                                                                           
                                                                                            
               |
|IMPRESSION:                                                                                
                                                                                            
              |
|1.  Lumbar spine: Mild compression of the superior endplate of L1 of approximately 20%, age
 indeterminate with mild spondylotic changes of the spine.                                  
               |
|2.  No evidence of sacrococcygeal fracture.                                                
                                                                                            
               |
|                                                                                           
                                                                                            
               |
|Electronically signed by Eddie Lerma MD on 2018 3:37 PM                            
                                                                                            
               |
+-------------------------------------------------------------------------------------------
--------------------------------------------------------------------------------------------
---------------+
 
 
 
+--------------------+------------------+--------------------+--------------+
| Performing         | Address          | City/State/Zipcode | Phone Number |
| Organization       |                  |                    |              |
+--------------------+------------------+--------------------+--------------+
|   [facility] |   [address] | [place] [address] |              |
+--------------------+------------------+--------------------+--------------+
 XR Chest PA and Lateral (2018  3:09 PM)Only the most recent of 3 results within the  period is included.
 
+------------------------------------------------------------------------+--------------+
| Impressions                                                            | Performed At |
 
+------------------------------------------------------------------------+--------------+
|   1.    Mild bilateral perihilar and bibasilar atelectasis.            |   [facility]     |
| 2.    Distention of the stomach.     Electronically signed by Eddie GONZALEZ  | RADIOLOGY    |
| MD Florentin on 2018 3:35 PM                                         |              |
+------------------------------------------------------------------------+--------------+
 
 
 
+------------------------------------------------------------------------+--------------+
| Narrative                                                              | Performed At |
+------------------------------------------------------------------------+--------------+
|   HISTORY:  Cough. Congestion. Question pneumonia.     COMPARISON:     |   [facility]     |
| 18.     TECHNIQUE:  AP and lateral films of the chest.           | RADIOLOGY    |
| FINDINGS:  Heart size is normal. Mild ectasia and tortuosity of the    |              |
| thoracic aorta. Minimal strandy change in both lung bases along the    |              |
| hemidiaphragms consistent with atelectasis. Subtle bilateral perihilar |              |
|  atelectasis. No infiltrates. No effusions. Mild degenerative changes  |              |
| of the spine. Distention of the stomach.                               |              |
+------------------------------------------------------------------------+--------------+
 
 
 
+-------------------------------------------------------------------------------------------
--------------------------------------------------------------------------------------------
------------------------------+
| Procedure Note                                                                            
                                                                                            
                              |
+-------------------------------------------------------------------------------------------
--------------------------------------------------------------------------------------------
------------------------------+
|   Edil, Rad Results In - 2018  3:40 PM PDT  HISTORY:Cough. Congestion. Question      
                                                                                            
                              |
| pneumonia.COMPARISON:18.TECHNIQUE:AP and lateral films of the chest.FINDINGS:Heart  
                                                                                            
                              |
|  size is normal. Mild ectasia and tortuosity of the thoracic aorta. Minimal strandy       
                                                                                            
                              |
| change in both lung bases along the hemidiaphragms consistent with atelectasis. Subtle    
                                                                                            
                              |
| bilateral perihilar atelectasis. No infiltrates. No effusions. Mild degenerative changes  
                                                                                            
                              |
|  of the spine. Distention of the stomach.IMPRESSION:1.  Mild bilateral perihilar and      
                                                                                            
                              |
| bibasilar atelectasis.2.  Distention of the stomach.Electronically signed by Eddie GONZALEZ      
                                                                                            
                              |
| MD Florentin on 2018 3:35 PM                                                            
                                                                                            
                              |
|                                                                                           
                                                                                            
                              |
|FINDINGS:                                                                                  
                                                                                            
 
                             |
|Heart size is normal. Mild ectasia and tortuosity of the thoracic aorta. Minimal strandy ch
phyllis in both lung bases along the hemidiaphragms consistent with atelectasis. Subtle bilater
al perihilar atelectasis. No  |
|infiltrates. No effusions. Mild degenerative changes of the spine. Distention of the stomac
h.                                                                                          
                             |
|                                                                                           
                                                                                            
                              |
|IMPRESSION:                                                                                
                                                                                            
                             |
|1.  Mild bilateral perihilar and bibasilar atelectasis.                                    
                                                                                            
                              |
|2.  Distention of the stomach.                                                             
                                                                                            
                              |
|                                                                                           
                                                                                            
                              |
|Electronically signed by Eddie Lerma MD on 2018 3:35 PM                            
                                                                                            
                              |
+-------------------------------------------------------------------------------------------
--------------------------------------------------------------------------------------------
------------------------------+
 
 
 
+--------------------+------------------+--------------------+--------------+
| Performing         | Address          | City/State/Zipcode | Phone Number |
| Organization       |                  |                    |              |
+--------------------+------------------+--------------------+--------------+
|   [facility] |   [address] | [place] [address] |              |
+--------------------+------------------+--------------------+--------------+
 XR Sacrum and Coccyx (2018  3:09 PM)
 
+------------------------------------------------------------------------+--------------+
| Impressions                                                            | Performed At |
+------------------------------------------------------------------------+--------------+
|   1.    Lumbar spine: Mild compression of the superior endplate of L1  |   [facility]     |
| of approximately 20%, age indeterminate with mild spondylotic changes  | RADIOLOGY    |
| of the spine.  2.    No evidence of sacrococcygeal fracture.           |              |
| Electronically signed by Eddie Lerma MD on 2018 3:37 PM        |              |
+------------------------------------------------------------------------+--------------+
 
 
 
+------------------------------------------------------------------------+--------------+
| Narrative                                                              | Performed At |
+------------------------------------------------------------------------+--------------+
|   HISTORY:  Sacrococcygeal injury. Lumbar spine injury. Fall.          |   [facility]     |
| COMPARISON:  None.     TECHNIQUE:  AP, lateral films of the lumbar     | RADIOLOGY    |
| spine.  AP, angled AP, lateral films of the sacrum and coccyx.         |              |
| FINDINGS:  Lumbar spine:  Mild diffuse osteopenia. Compression of the  |              |
| superior endplate of L1 of approximately 10-20%, age indeterminate.    |              |
| Coil in the right paraspinal T12 level. Mild L5-S1, L4-L5 degenerative |              |
|  facet changes.     Sacrum:  SI joints unremarkable. No definable      |              |
 
| sacral or coccygeal fracture. Oval density in the low pelvis probably  |              |
| represents partially distended urinary bladder.                        |              |
+------------------------------------------------------------------------+--------------+
 
 
 
+-------------------------------------------------------------------------------------------
--------------------------------------------------------------------------------------------
---------------+
| Procedure Note                                                                            
                                                                                            
               |
+-------------------------------------------------------------------------------------------
--------------------------------------------------------------------------------------------
---------------+
|   Edil, Rad Results In - 2018  3:42 PM PDT  HISTORY:Sacrococcygeal injury. Lumbar    
                                                                                            
               |
| spine injury. Fall.COMPARISON:None.TECHNIQUE:AP, lateral films of the lumbar spine.AP,    
                                                                                            
               |
| angled AP, lateral films of the sacrum and coccyx.FINDINGS:Lumbar spine:Mild diffuse      
                                                                                            
               |
| osteopenia. Compression of the superior endplate of L1 of approximately 10-20%, age       
                                                                                            
               |
| indeterminate. Coil in the right paraspinal T12 level. Mild L5-S1, L4-L5 degenerative     
                                                                                            
               |
| facet changes.Sacrum:SI joints unremarkable. No definable sacral or coccygeal fracture.   
                                                                                            
               |
| Oval density in the low pelvis probably represents partially distended urinary            
                                                                                            
               |
| bladder.IMPRESSION:1.  Lumbar spine: Mild compression of the superior endplate of L1 of   
                                                                                            
               |
| approximately 20%, age indeterminate with mild spondylotic changes of the spine.2.  No    
                                                                                            
               |
| evidence of sacrococcygeal fracture.Electronically signed by Eddie Lerma MD on         
                                                                                            
               |
| 2018 3:37 PM                                                                         
                                                                                            
               |
|Lumbar spine:                                                                              
                                                                                            
               |
|Mild diffuse osteopenia. Compression of the superior endplate of L1 of approximately 10-20%
, age indeterminate. Coil in the right paraspinal T12 level. Mild L5-S1, L4-L5 degenerative 
facet changes. |
|                                                                                           
                                                                                            
               |
|Sacrum:                                                                                    
                                                                                            
              |
 
|SI joints unremarkable. No definable sacral or coccygeal fracture. Oval density in the low 
pelvis probably represents partially distended urinary bladder.                             
               |
|                                                                                           
                                                                                            
               |
|IMPRESSION:                                                                                
                                                                                            
              |
|1.  Lumbar spine: Mild compression of the superior endplate of L1 of approximately 20%, age
 indeterminate with mild spondylotic changes of the spine.                                  
               |
|2.  No evidence of sacrococcygeal fracture.                                                
                                                                                            
               |
|                                                                                           
                                                                                            
               |
|Electronically signed by Eddie Lerma MD on 2018 3:37 PM                            
                                                                                            
               |
+-------------------------------------------------------------------------------------------
--------------------------------------------------------------------------------------------
---------------+
 
 
 
+--------------------+------------------+--------------------+--------------+
| Performing         | Address          | City/State/Zipcode | Phone Number |
| Organization       |                  |                    |              |
+--------------------+------------------+--------------------+--------------+
|   [facility] |   [address] | [place] [address] |              |
+--------------------+------------------+--------------------+--------------+
 Urinalysis (reflex to micro/reflex to culture) (2018  2:48 PM)Only the most recent of
 2 results within the time period is included.
 
+----------------------+--------------------------+----------------+-----------------+
| Component            | Value                    | Ref Range      | Performed At    |
+----------------------+--------------------------+----------------+-----------------+
| COLOR UA             | YELLOW                   |                | [facility] |
+----------------------+--------------------------+----------------+-----------------+
| CLARITY              | HAZY                     |                | [facility] |
+----------------------+--------------------------+----------------+-----------------+
| Specific [place], UA | 1.013                    | 1.002 - 1.030  | [facility] |
+----------------------+--------------------------+----------------+-----------------+
| LEUKOCYTE ESTERASE   | NEGATIVE                 | NEGATIVE       | [facility] |
+----------------------+--------------------------+----------------+-----------------+
| NITRITE              | NEGATIVE                 | NEGATIVE       | [facility] |
+----------------------+--------------------------+----------------+-----------------+
| UROBILINOGEN         | NORMAL                   | <1.1 mg/dL     | [facility] |
+----------------------+--------------------------+----------------+-----------------+
| PROTEIN              | NEGATIVE                 | NEGATIVE mg/dL | [facility] |
+----------------------+--------------------------+----------------+-----------------+
| PH,URINE             | 7.0                      | 5.0 - 8.0      | [facility] |
+----------------------+--------------------------+----------------+-----------------+
| BLOOD                | NEGATIVE                 | NEGATIVE       | [facility] |
+----------------------+--------------------------+----------------+-----------------+
| KETONES              | NEGATIVE                 | NEGATIVE mg/dL | [facility] |
+----------------------+--------------------------+----------------+-----------------+
| BILIRUBIN            | NEGATIVE                 | NEGATIVE       | [facility] |
 
+----------------------+--------------------------+----------------+-----------------+
| GLUCOSE              | NEGATIVEComment: Testing | NEGATIVE mg/dL | [facility] |
|                      |  performed at [facility] [facility]    |                |                 |
|                      | [address];[place]   |                |                 |
|                      | [id number]                    |                |                 |
+----------------------+--------------------------+----------------+-----------------+
 
 
 
+--------------------+
| Specimen           |
+--------------------+
| Urine, Clean Catch |
+--------------------+
 
 
 
+-------------------+------------------+--------------------+--------------+
| Performing        | Address          | City/State/Zipcode | Phone Number |
| Organization      |                  |                    |              |
+-------------------+------------------+--------------------+--------------+
|   [facility] |   [address] | [place] [address] |              |
+-------------------+------------------+--------------------+--------------+
 Septic Lactic Acid (2018  2:35 PM)Only the most recent of 2 results within the time p
jaycee is included.
 
+-------------+-------------------------+------------------+-----------------+
| Component   | Value                   | Ref Range        | Performed At    |
+-------------+-------------------------+------------------+-----------------+
| LACTIC ACID | 1.3Comment: Testing     | 0.4 - 2.0 mmol/L | [facility] |
|             | performed at [facility]    |                  |                 |
|             | [address];[place]  |                  |                 |
|             | [id number]                   |                  |                 |
+-------------+-------------------------+------------------+-----------------+
 
 
 
+-------------------+------------------+--------------------+--------------+
| Performing        | Address          | City/State/Zipcode | Phone Number |
| Organization      |                  |                    |              |
+-------------------+------------------+--------------------+--------------+
|   [facility] |   [address] | [place] [address] |              |
+-------------------+------------------+--------------------+--------------+
 Troponin I, Lab (2018  2:35 PM)Only the most recent of 2 results within the time brooklynn
od is included.
 
+------------+--------------------------+-------------------+-----------------+
| Component  | Value                    | Ref Range         | Performed At    |
+------------+--------------------------+-------------------+-----------------+
| TROPONIN I | <0.020Comment:   0.00 to | 0.00 - 0.10 ng/mL | [facility] |
|            |  0.10     CONSISTENT     |                   |                 |
|            | WITH NORMAL              |                   |                 |
|            | POPULATION0.11 to        |                   |                 |
|            | 0.60     CONSISTENT WITH |                   |                 |
|            |  INCREASED RISK FOR      |                   |                 |
|            | ADVERSE OUTCOMES>        |                   |                 |
|            | 0.60                     |                   |                 |
|            | CONSISTENT WITH WHO      |                   |                 |
|            | CRITERIA FOR ACUTE MI    |                   |                 |
|            | Testing performed at     |                   |                 |
 
|            | [facility];[address]            |                   |                 |
|            | [address];[place] [address]   |                   |                 |
+------------+--------------------------+-------------------+-----------------+
 
 
 
+----------+
| Specimen |
+----------+
| Blood    |
+----------+
 
 
 
+-------------------+------------------+--------------------+--------------+
| Performing        | Address          | City/State/Zipcode | Phone Number |
| Organization      |                  |                    |              |
+-------------------+------------------+--------------------+--------------+
|   [facility] |   [address] | [place] [address] |              |
+-------------------+------------------+--------------------+--------------+
 CBC with differential (2018  2:35 PM)Only the most recent of 4 results within the  period is included.
 
+-----------------+-------------------------+-------------------+-----------------+
| Component       | Value                   | Ref Range         | Performed At    |
+-----------------+-------------------------+-------------------+-----------------+
| WBC             | 9.14                    | 3.80 - 11.00 K/uL | [facility] |
+-----------------+-------------------------+-------------------+-----------------+
| RBC             | 4.42                    | 4.20 - 5.70 M/uL  | [facility] |
+-----------------+-------------------------+-------------------+-----------------+
| HGB             | 14.0                    | 13.2 - 17.0 g/dL  | [facility] |
+-----------------+-------------------------+-------------------+-----------------+
| HCT             | 42.5                    | 39.0 - 50.0 %     | [facility] |
+-----------------+-------------------------+-------------------+-----------------+
| MCV             | 96.1                    | 80.0 - 100.0 fl   | [facility] |
+-----------------+-------------------------+-------------------+-----------------+
| MCH             | 31.6                    | 27.0 - 34.0 pg    | [facility] |
+-----------------+-------------------------+-------------------+-----------------+
| MCHC            | 32.9                    | 32.0 - 35.5 g/dL  | [facility] |
+-----------------+-------------------------+-------------------+-----------------+
| RDW SD          | 45.5                    | 37 - 53 fl        | [facility] |
+-----------------+-------------------------+-------------------+-----------------+
| PLT             | 317                     | 150 - 400 K/uL    | [facility] |
+-----------------+-------------------------+-------------------+-----------------+
| MPV             | 8.6                     | fl                | [facility] |
+-----------------+-------------------------+-------------------+-----------------+
| DIFF TYPE       | AUTOMATED               |                   | [facility] |
+-----------------+-------------------------+-------------------+-----------------+
| NEUTROPHILS     | 80.59                   | %                 | [facility] |
+-----------------+-------------------------+-------------------+-----------------+
| LYMPHOCYTES     | 11.41                   | %                 | [facility] |
+-----------------+-------------------------+-------------------+-----------------+
| MONOCYTES       | 5.80                    | %                 | [facility] |
+-----------------+-------------------------+-------------------+-----------------+
| EOSINOPHILS     | 1.45                    | %                 | [facility] |
+-----------------+-------------------------+-------------------+-----------------+
| BASOPHILS       | 0.75                    | %                 | [facility] |
+-----------------+-------------------------+-------------------+-----------------+
| NEUTROPHILS ABS | 7.36                    | 1.90 - 7.40 K/uL  | [facility] |
+-----------------+-------------------------+-------------------+-----------------+
 
| LYMPHOCYTES ABS | 1.04                    | 1.00 - 3.90 K/uL  | [facility] |
+-----------------+-------------------------+-------------------+-----------------+
| MONOCYTES ABS   | 0.53                    | 0.00 - 0.80 K/uL  | [facility] |
+-----------------+-------------------------+-------------------+-----------------+
| EOSINOPHILS ABS | 0.13                    | 0.00 - 0.50 K/uL  | [facility] |
+-----------------+-------------------------+-------------------+-----------------+
| BASOPHILS ABS   | 0.07Comment: Testing    | 0.00 - 0.10 K/uL  | [facility] |
|                 | performed at [facility]    |                   |                 |
|                 | [address];[place]  |                   |                 |
|                 | [id number]                   |                   |                 |
+-----------------+-------------------------+-------------------+-----------------+
 
 
 
+----------+
| Specimen |
+----------+
| Blood    |
+----------+
 
 
 
+-------------------+------------------+--------------------+--------------+
| Performing        | Address          | City/State/Zipcode | Phone Number |
| Organization      |                  |                    |              |
+-------------------+------------------+--------------------+--------------+
|   [facility] |   [address] | [place] [address] |              |
+-------------------+------------------+--------------------+--------------+
 Comprehensive metabolic panel (2018  2:35 PM)Only the most recent of 4 results within
 the time period is included.
 
+----------------+--------------------------+-------------------+-----------------+
| Component      | Value                    | Ref Range         | Performed At    |
+----------------+--------------------------+-------------------+-----------------+
| SODIUM         | 140                      | 135 - 145 mmol/L  | [facility] |
+----------------+--------------------------+-------------------+-----------------+
| POTASSIUM      | 3.8                      | 3.5 - 4.9 mmol/L  | [facility] |
+----------------+--------------------------+-------------------+-----------------+
| CHLORIDE       | 106                      | 99 - 109 mmol/L   | [facility] |
+----------------+--------------------------+-------------------+-----------------+
| CO2            | 28                       | 23 - 32 mmol/L    | [facility] |
+----------------+--------------------------+-------------------+-----------------+
| ANION GAP AGAP | 10                       | 5 - 20 mmol/L     | [facility] |
+----------------+--------------------------+-------------------+-----------------+
| GLUCOSE        | 149 (H)                  | 65 - 99 mg/dL     | [facility] |
+----------------+--------------------------+-------------------+-----------------+
| BUN            | 14                       | 8 - 25 mg/dL      | [facility] |
+----------------+--------------------------+-------------------+-----------------+
| CREATININE     | 0.84                     | 0.70 - 1.30 mg/dL | [facility] |
+----------------+--------------------------+-------------------+-----------------+
| BUN/CREAT      | 17                       |                   | [facility] |
+----------------+--------------------------+-------------------+-----------------+
| CALCIUM        | 8.3 (L)                  | 8.5 - 10.5 mg/dL  | [facility] |
+----------------+--------------------------+-------------------+-----------------+
| TOTAL PROTEIN  | 7.1                      | 6.3 - 8.2 g/dL    | [facility] |
+----------------+--------------------------+-------------------+-----------------+
| Albumin        | 3.1 (L)                  | 3.3 - 4.8 g/dL    | [facility] |
+----------------+--------------------------+-------------------+-----------------+
| GLOBULIN       | 4.1                      | 1.3 - 4.9 g/dL    | [facility] |
+----------------+--------------------------+-------------------+-----------------+
 
| A/G            | 0.8 (L)                  | 1.0 - 2.4         | [facility] |
+----------------+--------------------------+-------------------+-----------------+
| TBIL           | 0.3                      | 0.1 - 1.5 mg/dL   | [facility] |
+----------------+--------------------------+-------------------+-----------------+
| ALK PHOS       | 80                       | 35 - 115 U/L      | [facility] |
+----------------+--------------------------+-------------------+-----------------+
| AST            | 19                       | 10 - 45 U/L       | [facility] |
+----------------+--------------------------+-------------------+-----------------+
| ALT            | 25                       | 10 - 65 U/L       | [facility] |
+----------------+--------------------------+-------------------+-----------------+
| EGFR           | >60Comment: GFR <60:     | >60 mL/min/1.73m2 | [facility] |
|                | CHRONIC KIDNEY DISEASE,  |                   |                 |
|                | IF FOUND OVER A 3 MONTH  |                   |                 |
|                | PERIOD.GFR <15: KIDNEY   |                   |                 |
|                | FAILURE.FOR       |                   |                 |
|                | AMERICANS, MULTIPLY THE  |                   |                 |
|                | CALCULATED GFR BY        |                   |                 |
|                | 1.210.This eGFR is       |                   |                 |
|                | calculated using the     |                   |                 |
|                | MDRD IDMS traceable      |                   |                 |
|                | equation.Testing         |                   |                 |
|                | performed at [facility]     |                   |                 |
|                | [facility];[place], [place]   |                   |                 |
|                | [id number]                    |                   |                 |
+----------------+--------------------------+-------------------+-----------------+
 
 
 
+----------+
| Specimen |
+----------+
| Blood    |
+----------+
 
 
 
+-------------------+------------------+--------------------+--------------+
| Performing        | Address          | City/State/Zipcode | Phone Number |
| Organization      |                  |                    |              |
+-------------------+------------------+--------------------+--------------+
|   [facility] |   [address] | [place] [address] |              |
+-------------------+------------------+--------------------+--------------+
 EKG 12 LEAD UNIT PERFORMED (2018  2:33 PM)Only the most recent of 3 results within 
e time period is included.
 
+-------------------+-------------------------+-----------+--------------+
| Component         | Value                   | Ref Range | Performed At |
+-------------------+-------------------------+-----------+--------------+
| Ventricular Rate  | 90                      | BPM       | [facility]     |
+-------------------+-------------------------+-----------+--------------+
| Atrial Rate       | 90                      | BPM       | [facility]     |
+-------------------+-------------------------+-----------+--------------+
| P-R Interval      | 152                     | ms        | [facility]     |
+-------------------+-------------------------+-----------+--------------+
| QRS Duration      | 80                      | ms        | [facility]     |
+-------------------+-------------------------+-----------+--------------+
| Q-T Interval      | 354                     | ms        | [facility]     |
+-------------------+-------------------------+-----------+--------------+
| QTC Calculation   | 433                     | ms        | [facility]     |
| (Jaquelin)           |                         |           |              |
 
+-------------------+-------------------------+-----------+--------------+
| Calculated P Axis | 46                      | degrees   | [facility]     |
+-------------------+-------------------------+-----------+--------------+
| Calculated R Axis | 25                      | degrees   | [facility]     |
+-------------------+-------------------------+-----------+--------------+
| Calculated T Axis | 40                      | degrees   | [facility]     |
+-------------------+-------------------------+-----------+--------------+
| Diagnosis         | Normal sinus            |           | [facility]     |
|                   | rhythmNormal ECGWhen    |           |              |
|                   | compared with ECG of    |           |              |
|                   | 2018 17:31,No    |           |              |
|                   | significant change was  |           |              |
|                   | foundThis ECG contains  |           |              |
|                   | Unconfirmed             |           |              |
|                   | Interpretation          |           |              |
|                   | Statements.    See ED   |           |              |
|                   | Record for Physician    |           |              |
|                   | Interpretation.         |           |              |
|                   | Confirmed by MUSE READ  |           |              |
|                   | ONLY, -COMPUTER ([id number]),  |           |              |
|                   |  Meryl Kern  |           |              |
|                   | (79) on 2018       |           |              |
|                   | 2:52:05 AM              |           |              |
+-------------------+-------------------------+-----------+--------------+
 
 
 
+--------------+-------------------+--------------------+--------------+
| Performing   | Address           | City/State/Zipcode | Phone Number |
| Organization |                   |                    |              |
+--------------+-------------------+--------------------+--------------+
|   [facility]   |   [address] | [place] [address] |              |
+--------------+-------------------+--------------------+--------------+
 ED INFORMATION EXCHANGE (2018  1:59 PM)Only the most recent of 2 results within the t
kailash period is included.
 
+------------------------------------------------------------------------+--------------+
| Narrative                                                              | Performed At |
+------------------------------------------------------------------------+--------------+
|   [id number] [id number]     This patient has registered at the   |   ED         |
| [facility] Emergency Department   For more         | INFORMATION  |
| information visit:                                                     | EXCHANGE     |
| [email or web address] |              |
| [id number]   Security Events  No recent Security Events currently on file  |              |
|     ED Care Guidelines from [facility]  Last Updated: 18 |              |
|  9:04 AM         Additional Information:  Currently placed at [facility] |              |
|  [facility] Adult [facility], please contact Gaby  |              |
| [place] [phone].Prescription medication goes through [facility], |              |
|  [phone].  These are guidelines and the provider should exercise  |              |
| clinical judgment when providing care.     Recent Emergency Department |              |
|  Visit Summary  Admit Date Facility City State Type Major Type         |              |
| Diagnoses or Chief Complaint   [facility]       |              |
| [facility] Emergency    Emergency          increased confusion,         |              |
| dizziness, weakness, and coughing. pt has UTI, MRSA, and recent hx of  |              |
| pneumonia      Sep 7, 2018 [facility] [place]. [place]  |              |
| Emergency    Emergency    Chief Complaint: URO MALE      2018  |              |
| [facility] [place] Emergency    Emergency                  |              |
|     Weakness        Shortness of Breath        Chronic obstructive     |              |
| pulmonary disease with (acute) exacerbation        Other forms of      |              |
| dyspnea      2018 [facility]               |              |
 
| Emergency    Emergency          Shortness of Breath        Weakness    |              |
|      Lymphedema, not elsewhere classified            E.D. Visit Count  |              |
| (12 mo.)  Facility Visits Low Acuity   [facility]       |              |
| System 1 0   [facility] 4 0   Total 5 0   Note:    |              |
| Visits indicate total known visits. Medicaid Low Acuity Dx are the     |              |
| number of primary diagnoses on the Medicaid's Low Acuity dx list.      |              |
|     Recent Inpatient Visit Summary  Admit Date [facility] City State     |              |
| Type Major Type Diagnoses or Chief Complaint   2018 [facility]     |              |
| [facility] [place]. [place] General Medicine    Inpatient          Other   |              |
| forms of dyspnea        Chronic obstructive pulmonary disease with     |              |
| (acute) exacerbation        Hypoxemia        Other specified soft      |              |
| tissue disorders            PDMP Report  PDMP query found no report.   |              |
|    Care Providers  Provider PRC Type Phone Fax Service Dates   SARA, |              |
|  VINCE PHAN Physician Assistant [phone]       |              |
| Current      VIKAS Granger  ([phone])        |              |
| [phone] Current      [facility]      |              |
| Health Provider [phone]    Current         Known Aliases  No    |              |
| known aliases.   Criteria met         Care Guidelines     The above    |              |
| information is provided for the sole purpose of patient treatment. Use |              |
|  of this information beyond the terms of Data Sharing Memorandum of    |              |
| Understanding and License Agreement is prohibited. In certain cases    |              |
| not all visits may be represented.   Consult the aforementioned        |              |
| facilities for additional information.   2018 [facility]       |              |
| [place] [place], [place] -                              |              |
| [email or web address]                                         |              |
+------------------------------------------------------------------------+--------------+
 
 
 
+-------------------------------------------------------------------------------------------
--------------------------------------------------------------------------------------------
--------------------+
| Procedure Note                                                                            
                                                                                            
                    |
+-------------------------------------------------------------------------------------------
--------------------------------------------------------------------------------------------
--------------------+
|   Interface, Lab - 2018  2:00 PM PDT  Formatting of this note may be different      
                                                                                            
                    |
| from the original.[id number] [id number] patient has registered at the [facility]    
                                                                                            
                    |
| [facility] Emergency Department For more information visit:                  
                                                                                            
                    |
| [email or web address] Security     
                                                                                            
                    |
| EventsNo recent Security Events currently on fileED Care Guidelines from [employer] -       
                                                                                            
                    |
| [place] Updated: 18 9:04 AM  Additional Information:Currently placed at         
                                                                                            
                    |
| [facility], please contact Candace Osgood      
                                                                                            
                    |
| [phone].Prescription medication goes through [facility], [phone].These are    
 
                                                                                            
                    |
| guidelines and the provider should exercise clinical judgment when providing care.Recent  
                                                                                            
                    |
|  Emergency Department Visit SummaryAdmit Date Facility City State Type Major Type         
                                                                                            
                    |
| Diagnoses or Chief Complaint Sep 16, 2018 [facility] Emergency        
                                                                                            
                    |
| Emergency    increased confusion, dizziness, weakness, and coughing. pt has UTI, MRSA,    
                                                                                            
                    |
| and recent hx of pneumonia  Sep 7, 2018 [facility] [place]        
                                                                                            
                    |
| Emergency  Emergency  Chief Complaint: URO MALE  2018 [facility]        
                                                                                            
                    |
| [facility] Emergency  Emergency    Weakness    Shortness of Breath    Chronic obstructive  
                                                                                            
                    |
|  pulmonary disease with (acute) exacerbation    Other forms of dyspnea  2018      
                                                                                            
                    |
| [facility] Emergency  Emergency    Shortness of Breath    Weakness    
                                                                                            
                    |
|   Lymphedema, not elsewhere classified  E.D. Visit Count (12 mo.)Facility Visits Low      
                                                                                            
                    |
| Acuity [facility] 1 0 [facility] 4 0 Total 5 0  
                                                                                            
                    |
|  Note: Visits indicate total known visits. Medicaid Low Acuity Dx are the number of       
                                                                                            
                    |
| primary diagnoses on the Medicaid's Low Acuity dx list.  Recent Inpatient Visit           
                                                                                            
                    |
| SummaryAdmit Date Facility City State Type Major Type Diagnoses or Chief Complaint Jul    
                                                                                            
                    |
| 2018 [facility] General Medicine  Inpatient    Other forms of     
                                                                                            
                    |
| dyspnea    Chronic obstructive pulmonary disease with (acute) exacerbation    Hypoxemia   
                                                                                            
                    |
|    Other specified soft tissue disorders  PDMP ReportPDMP query found no report.Care      
                                                                                            
                    |
| ProvidersProvider [facility] Type Phone Fax Service Dates [facility] Physician        
                                                                                            
                    |
| Assistant [phone] Current  [facility]    
                                                                                            
                    |
| Worker [phone] Current  [facility]         
 
                                                                                            
                    |
| Provider [phone]  Current  Known AliasesNo known aliases. Criteria met  Care       
                                                                                            
                    |
| GuidelinesThe above information is provided for the sole purpose of patient treatment.    
                                                                                            
                    |
| Use of this information beyond the terms of Data Sharing Memorandum of Understanding and  
                                                                                            
                    |
|  License Agreement is prohibited. In certain cases not all visits may be represented.     
                                                                                            
                    |
| Consult the aforementioned facilities for additional information. 2018 [employer]         
                                                                                            
                    |
| [employer] [place], [place] - [email or web address]          
                                                                                            
                    |
|E.D. Visit Count (12 mo.)                                                                  
                                                                                            
                   |
|Facility Visits Low Acuity                                                                 
                                                                                            
                    |
|[facility] 1 0                                                       
                                                                                            
                    |
|[facility] 4 0                                                         
                                                                                            
                    |
|Total 5 0                                                                                  
                                                                                            
                    |
|Note: Visits indicate total known visits. Medicaid Low Acuity Dx are the number of primary 
diagnoses on the Medicaid's Low Acuity dx list.                                             
                    |
|                                                                                           
                                                                                            
                    |
|Recent Inpatient Visit Summary                                                             
                                                                                            
                    |
|Admit Date Facility City State Type Major Type Diagnoses or Chief Complaint                
                                                                                            
                    |
[facility]  Inpatient                    
                                                                                            
                    |
|  Other forms of dyspnea                                                                   
                                                                                            
                    |
|   Chronic obstructive pulmonary disease with (acute) exacerbation                         
                                                                                            
                    |
|   Hypoxemia                                                                               
                                                                                            
                    |
|   Other specified soft tissue disorders                                                   
 
                                                                                            
                    |
|                                                                                           
                                                                                            
                    |
|                                                                                           
                                                                                            
                    |
|                                                                                           
                                                                                            
                    |
|PDMP Report                                                                                
                                                                                            
                    |
|PDMP query found no report.                                                                
                                                                                            
                    |
|                                                                                           
                                                                                            
                    |
|Care Providers                                                                             
                                                                                            
                    |
|Provider PRC Type Phone Fax Service Dates                                                  
                                                                                            
                    |
|MAI [facility] PA Physician Assistant [phone] Current            
                                                                                            
                    |
|[facility]  [phone] Current         
                                                                                            
                    |
|[facility] Health Provider [phone]  Current                       
                                                                                            
                    |
|                                                                                           
                                                                                            
                    |
|Known Aliases                                                                              
                                                                                            
                    |
|No known aliases.                                                                          
                                                                                            
                    |
|Criteria met                                                                               
                                                                                            
                    |
|                                                                                           
                                                                                            
                    |
|  Care Guidelines                                                                          
                                                                                            
                    |
|                                                                                           
                                                                                            
                    |
|The above information is provided for the sole purpose of patient treatment. Use of this in
formation beyond the terms of Data Sharing Memorandum of Understanding and License Agreement
 is prohibited. In  |
|certain cases not all visits may be represented. Consult the aforementioned facilities for 
 
additional information.                                                                     
                    |
|2018 [employer] - [place], [place] - [email or web address]                                                                                       
                    |
+-------------------------------------------------------------------------------------------
--------------------------------------------------------------------------------------------
--------------------+
 
 
 
+-------------------+---------+--------------------+--------------+
| Performing        | Address | City/State/Zipcode | Phone Number |
| Organization      |         |                    |              |
+-------------------+---------+--------------------+--------------+
|   ED INFORMATION  |         |                    |              |
| EXCHANGE          |         |                    |              |
+-------------------+---------+--------------------+--------------+
 PROCALCITONIN (2018  4:25 AM)Only the most recent of 2 results within the time period
 is included.
 
+---------------+--------------------------+------------+-----------------+
| Component     | Value                    | Ref Range  | Performed At    |
+---------------+--------------------------+------------+-----------------+
| PROCALCITONIN | 0.15Comment:             | <0.5 ng/mL | [facility] |
|               | INTERPRETIVE             |            |                 |
|               | INFORMATION:    PROCALCI |            |                 |
|               | TONIN PCT <= 0.5         |            |                 |
|               | ng/mL:        Low risk   |            |                 |
|               | for progression to       |            |                 |
|               | severe                   |            |                 |
|               | systemic        bacteria |            |                 |
|               | l infection (severe      |            |                 |
|               | sepsis/septic            |            |                 |
|               | shock).        Does not  |            |                 |
|               | exclude an infection,    |            |                 |
|               | because                  |            |                 |
|               | localized        infecti |            |                 |
|               | ons may be associated    |            |                 |
|               | with such low            |            |                 |
|               | levels.        If PCT is |            |                 |
|               |  measured very early     |            |                 |
|               | after                    |            |                 |
|               | bacterial        challen |            |                 |
|               | ge (usually <6 hours),   |            |                 |
|               | results may still        |            |                 |
|               | be        low and should |            |                 |
|               |  re-assess PCT 6-24      |            |                 |
|               | hours later. PCT >0.5    |            |                 |
|               | and <= 2                 |            |                 |
|               | ng/mL:        Moderate   |            |                 |
|               | risk for progression to  |            |                 |
|               | severe                   |            |                 |
|               | systemic        infectio |            |                 |
|               | n (severe sepsis/septic  |            |                 |
|               | shock).    Other         |            |                 |
|               | conditions are known to  |            |                 |
|               | elevate PCT, patient     |            |                 |
|               |         should be        |            |                 |
|               | closely monitored both   |            |                 |
 
|               | clinically and           |            |                 |
|               | by        re-assessing   |            |                 |
|               | PCT within 6-24 hours.   |            |                 |
|               | PCT > 2                  |            |                 |
|               | ng/mL:        High       |            |                 |
|               | likelihood for           |            |                 |
|               | progression to severe    |            |                 |
|               | systemic        bacteria |            |                 |
|               | l infection (severe      |            |                 |
|               | sepsis/septic shock).    |            |                 |
|               | PCT >= 10                |            |                 |
|               | ng/mL:        High       |            |                 |
|               | likelihood of severe     |            |                 |
|               | sepsis or septic         |            |                 |
|               | shock.Testing performed  |            |                 |
|               | at [facility]         |            |                 |
|               | [address];[place] [address]   |            |                 |
+---------------+--------------------------+------------+-----------------+
 
 
 
+-------------------+------------------+--------------------+--------------+
| Performing        | Address          | City/State/Zipcode | Phone Number |
| Organization      |                  |                    |              |
+-------------------+------------------+--------------------+--------------+
|   [facility] |   [address] | [place] [address] |              |
+-------------------+------------------+--------------------+--------------+
 Basic metabolic panel (2018  4:25 AM)
 
+----------------+--------------------------+-------------------+--------------+
| Component      | Value                    | Ref Range         | Performed At |
+----------------+--------------------------+-------------------+--------------+
| SODIUM         | 144                      | 135 - 145 mmol/L  | [facility]   |
|                |                          |                   | LABORATORY   |
+----------------+--------------------------+-------------------+--------------+
| POTASSIUM      | 3.4 (L)                  | 3.5 - 4.9 mmol/L  | [facility]   |
|                |                          |                   | LABORATORY   |
+----------------+--------------------------+-------------------+--------------+
| CHLORIDE       | 107                      | 99 - 109 mmol/L   | [facility]   |
|                |                          |                   | LABORATORY   |
+----------------+--------------------------+-------------------+--------------+
| CO2            | 26                       | 23 - 32 mmol/L    | [facility]   |
|                |                          |                   | LABORATORY   |
+----------------+--------------------------+-------------------+--------------+
| ANION GAP AGAP | 14                       | 5 - 20 mmol/L     | [facility]   |
|                |                          |                   | LABORATORY   |
+----------------+--------------------------+-------------------+--------------+
| GLUCOSE        | 106 (H)                  | 65 - 99 mg/dL     | [facility]   |
|                |                          |                   | LABORATORY   |
+----------------+--------------------------+-------------------+--------------+
| BUN            | 17                       | 8 - 25 mg/dL      | [facility]   |
|                |                          |                   | LABORATORY   |
+----------------+--------------------------+-------------------+--------------+
| CREATININE     | 0.8                      | 0.70 - 1.30 mg/dL | [facility]   |
|                |                          |                   | LABORATORY   |
+----------------+--------------------------+-------------------+--------------+
| BUN/CREAT      | 21                       |                   | [facility]   |
|                |                          |                   | LABORATORY   |
+----------------+--------------------------+-------------------+--------------+
| CALCIUM        | 8.8                      | 8.5 - 10.5 mg/dL  | [facility]   |
 
|                |                          |                   | LABORATORY   |
+----------------+--------------------------+-------------------+--------------+
| EGFR           | >60Comment: GFR <60:     | >60 mL/min/1.73m2 | [facility]   |
|                | CHRONIC KIDNEY DISEASE,  |                   | LABORATORY   |
|                | IF FOUND OVER A 3 MONTH  |                   |              |
|                | PERIOD.GFR <15: KIDNEY   |                   |              |
|                | FAILURE.FOR       |                   |              |
|                | [employer]IC[employer], MULTIPLY THE  |                   |              |
|                | CALCULATED GFR BY        |                   |              |
|                | 1.210.This eGFR is       |                   |              |
|                | calculated using the     |                   |              |
|                | MDRD [facility] traceable      |                   |              |
|                | equation.Testing         |                   |              |
|                | performed at [facility], [address] |                   |              |
|                |  [facility],        |                   |              |
|                | [place]    [address]   |                   |              |
+----------------+--------------------------+-------------------+--------------+
 
 
 
+----------+
| Specimen |
+----------+
| Blood    |
+----------+
 
 
 
+---------------+-------------------------+---------------------+----------------+
| Performing    | Address                 | City/State/Zipcode  | Phone Number   |
| Organization  |                         |                     |                |
+---------------+-------------------------+---------------------+----------------+
|   [facility]  |   [address]  | [place] [address] |   [phone] |
| LABORATORY    | [address]                   |                     |                |
+---------------+-------------------------+---------------------+----------------+
 Sputum culture (2018  8:00 AM)
 
+----------------------+--------------------------+-----------+--------------+
| Component            | Value                    | Ref Range | Performed At |
+----------------------+--------------------------+-----------+--------------+
| Specimen Description | SPUTUM                   |           | [facility]   |
|                      |                          |           | LABORATORY   |
+----------------------+--------------------------+-----------+--------------+
| GRAM STAIN           | GREATER THAN 10 SEC/LPF  |           | [facility]   |
|                      |                          |           | LABORATORY   |
+----------------------+--------------------------+-----------+--------------+
| GRAM STAIN           | LESS THAN 10 WBCS/LPF    |           | [facility]   |
|                      |                          |           | LABORATORY   |
+----------------------+--------------------------+-----------+--------------+
| GRAM STAIN           | 3+                       |           | [facility]   |
|                      |                          |           | LABORATORY   |
+----------------------+--------------------------+-----------+--------------+
| GRAM STAIN           | GRAM POSITIVE COCCI      |           | [facility]   |
|                      |                          |           | LABORATORY   |
+----------------------+--------------------------+-----------+--------------+
| CULTURE              | SMEAR CONTAINS GREATER   |           | [facility]   |
|                      | THAN 10 SEC/LPF          |           | LABORATORY   |
|                      | SUGGESTIVE OF POOR       |           |              |
|                      | QUALITY SPECIMEN.        |           |              |
|                      | SPECIMEN WILL NOT BE     |           |              |
 
|                      | CULTURED OR WILL BE      |           |              |
|                      | CULTURED BY SPECIAL      |           |              |
|                      | REQUEST ONLY.  PLEASE    |           |              |
|                      | RECOLLECT IF CLINICALLY  |           |              |
|                      | INDICATED.  SPECIMEN     |           |              |
|                      | WILL BE HELD 48 HOURS.   |           |              |
+----------------------+--------------------------+-----------+--------------+
 
 
 
+-----------------+
| Specimen        |
+-----------------+
| Sputum - Sputum |
+-----------------+
 
 
 
+---------------+-------------------------+---------------------+----------------+
| Performing    | Address                 | City/State/Zipcode  | Phone Number   |
| Organization  |                         |                     |                |
+---------------+-------------------------+---------------------+----------------+
|   [facility]  |   [address]  | [place] [address] |   [phone] |
| LABORATORY    | [address]                   |                     |                |
+---------------+-------------------------+---------------------+----------------+
 Phosphorus (2018  4:47 AM)
 
+------------+--------------------------+-----------------+--------------+
| Component  | Value                    | Ref Range       | Performed At |
+------------+--------------------------+-----------------+--------------+
| PHOSPHORUS | 2.1 (L)Comment: Testing  | 2.3 - 4.8 mg/dL | [facility]   |
|            | performed at [facility], [address] |                 | LABORATORY   |
|            |  [address],        |                 |              |
|            | [place]  [id number]     |                 |              |
+------------+--------------------------+-----------------+--------------+
 
 
 
+----------+
| Specimen |
+----------+
| Blood    |
+----------+
 
 
 
+---------------+-------------------------+---------------------+----------------+
| Performing    | Address                 | City/State/Zipcode  | Phone Number   |
| Organization  |                         |                     |                |
+---------------+-------------------------+---------------------+----------------+
|   [facility]  |   [address]  | [place] [address] |   [phone] |
| LABORATORY    | [address]                   |                     |                |
+---------------+-------------------------+---------------------+----------------+
 Magnesium (2018  4:47 AM)
 
+-----------+--------------------------+-----------------+--------------+
| Component | Value                    | Ref Range       | Performed At |
+-----------+--------------------------+-----------------+--------------+
| MAGNESIUM | 2.3Comment: Testing      | 1.7 - 2.4 mg/dL | [facility]   |
|           | performed at [facility], [address] |                 | LABORATORY   |
 
|           |  [facility],        |                 |              |
|           | [place]  [id number]     |                 |              |
+-----------+--------------------------+-----------------+--------------+
 
 
 
+----------+
| Specimen |
+----------+
| Blood    |
+----------+
 
 
 
+---------------+-------------------------+---------------------+----------------+
| Performing    | Address                 | City/State/Zipcode  | Phone Number   |
| Organization  |                         |                     |                |
+---------------+-------------------------+---------------------+----------------+
|   [facility]  |   [address]  | [place] [address] |   [phone] |
| LABORATORY    | [address]                   |                     |                |
+---------------+-------------------------+---------------------+----------------+
 CTA Chest Pulmonary Embolism w IV con (2018 11:44 PM)
 
+------------------------------------------------------------------------+--------------+
| Impressions                                                            | Performed At |
+------------------------------------------------------------------------+--------------+
|      1. Some images are degraded due to motion artifact. No obvious    |   [facility]     |
| pulmonary emboli.   2. Emphysema with pulmonary vascular congestion    | RADIOLOGY    |
| and bilateral infiltrate or atelectasis, right greater than left.   3. |              |
|  Dilated ascending aorta measuring 4.4 cm. No aortic dissection.   4.  |              |
| Marked tracheomalacia.     [facility]      Electronically signed by Alex    |              |
| MD Clay on 2018 12:56AM Referring Provider Line:             |              |
| [phone] ID: [id number]                                               |              |
+------------------------------------------------------------------------+--------------+
 
 
 
+------------------------------------------------------------------------+--------------+
| Narrative                                                              | Performed At |
+------------------------------------------------------------------------+--------------+
|   EXAM:  CT ANGIOGRAM CHEST     EXAM DATE: 2018 11:45 PM.         |   [facility]     |
| CLINICAL HISTORY: Hypoxemia. Dyspnea. COPD.     COMPARISON:            | RADIOLOGY    |
| 2018.     TECHNIQUE: Routine helical imaging was performed       |              |
| through the chest in the pulmonary arterial phase. IV Contrast:        |              |
| Nonionic. Reconstructions: Coronal 3-D MIP reconstructions.Sagittal    |              |
| and coronal.     In accordance with CT protocol optimization, one or   |              |
| more of the following dose reduction techniques were utilized for this |              |
|  exam: automated exposure control, adjustment of mA and/or KV based on |              |
|  patient size, or use of iterative reconstructive technique.           |              |
| FINDINGS:   Pulmonary Arteries:   Diagnostic quality: Suboptimal       |              |
| through the segmental arteries. Pulmonary arteries are well enhanced   |              |
| but some images are degraded due to motion artifact. No obvious        |              |
| pulmonary emboli.      No evidence of right heart strain.              |              |
| Lungs/Pleura: Emphysema. Pulmonary vascular congestion. Bilateral      |              |
| infiltrate or atelectasis, right greater than left. No pleural         |              |
| effusion seen. No pneumothorax.     Mediastinum: Heart size normal to  |              |
| upper normal. No lymphadenopathy seen. Marked tracheomalacia.          |              |
| Thoracic Aorta: Ascending aorta measures 4.4 cm. Mild atherosclerosis. |              |
|  No aortic dissection.     Upper Abdomen: Possible fatty liver.        |              |
| Other: None.                                                           |              |
 
+------------------------------------------------------------------------+--------------+
 
 
 
+-------------------------------------------------------------------------------------------
------------------------+
| Procedure Note                                                                            
                        |
+-------------------------------------------------------------------------------------------
------------------------+
|   Edil, Rad Results In - 2018 12:56 AM PDT  EXAM:CT ANGIOGRAM CHESTEXAM DATE:        
                        |
| 2018 11:45 PM.CLINICAL HISTORY: Hypoxemia. Dyspnea. COPD.COMPARISON:                 
                        |
| 2018.TECHNIQUE: Routine helical imaging was performed through the chest in the      
                        |
| pulmonary arterial phase. IV Contrast: Nonionic. Reconstructions: Coronal 3-D MIP         
                        |
| reconstructions.Sagittal and coronal.In accordance with CT protocol optimization, one or  
                        |
|  more of the following dose reduction techniques were utilized for this exam: automated   
                        |
| exposure control, adjustment of mA and/or KV based on patient size, or use of iterative   
                        |
| reconstructive technique.FINDINGS: Pulmonary Arteries: Diagnostic quality: Suboptimal     
                        |
| through the segmental arteries. Pulmonary arteries are well enhanced but some images are  
                        |
|  degraded due to motion artifact. No obvious pulmonary emboli. No evidence of right       
                        |
| heart strain.Lungs/Pleura: Emphysema. Pulmonary vascular congestion. Bilateral            
                        |
| infiltrate or atelectasis, right greater than left. No pleural effusion seen. No          
                        |
| pneumothorax.Mediastinum: Heart size normal to upper normal. No lymphadenopathy seen.     
                        |
| Marked tracheomalacia. Thoracic Aorta: Ascending aorta measures 4.4 cm. Mild              
                        |
| atherosclerosis. No aortic dissection.Upper Abdomen: Possible fatty liver.Other:          
                        |
| None.IMPRESSION:1. Some images are degraded due to motion artifact. No obvious pulmonary  
                        |
|  emboli. 2. Emphysema with pulmonary vascular congestion and bilateral infiltrate or      
                        |
| atelectasis, right greater than left. 3. Dilated ascending aorta measuring 4.4 cm. No     
                        |
| aortic dissection. 4. Marked tracheomalacia.[facility] Electronically signed by Alex Williamson   
                        |
| MD on 2018 12:56AM Referring Provider Line: [phone] ID: [id number]               
                        |
|Mediastinum: Heart size normal to upper normal. No lymphadenopathy seen. Marked tracheomala
adriane.                    |
|                                                                                           
                        |
|Thoracic Aorta: Ascending aorta measures 4.4 cm. Mild atherosclerosis. No aortic dissection
.                       |
|                                                                                           
                        |
 
|Upper Abdomen: Possible fatty liver.                                                       
                        |
|Other: None.                                                                               
                        |
|IMPRESSION:                                                                                
                       |
|1. Some images are degraded due to motion artifact. No obvious pulmonary emboli.           
                        |
|2. Emphysema with pulmonary vascular congestion and bilateral infiltrate or atelectasis, ri
ght greater than left.  |
|3. Dilated ascending aorta measuring 4.4 cm. No aortic dissection.                         
                        |
|4. Marked tracheomalacia.                                                                  
                        |
|[facility]                                                                                      
                       |
| Electronically signed by Alex Williamson MD on 2018 12:56AM Referring Provider Line: [id number]
[id number] ID: [id number] |
+-------------------------------------------------------------------------------------------
------------------------+
 
 
 
+--------------------+------------------+--------------------+--------------+
| Performing         | Address          | City/State/Zipcode | Phone Number |
| Organization       |                  |                    |              |
+--------------------+------------------+--------------------+--------------+
|   [facility] |   [address] | [place] [address] |              |
+--------------------+------------------+--------------------+--------------+
 POC Arterial Blood Gas (2018 11:11 PM)Only the most recent of 2 results within the ti
me period is included.
 
+------------------+--------------------------+------------------+-----------------+
| Component        | Value                    | Ref Range        | Performed At    |
+------------------+--------------------------+------------------+-----------------+
| pH, Art          | 7.341 (L)                | 7.350 - 7.450    | [facility] LABORAT[facility] |
+------------------+--------------------------+------------------+-----------------+
| POC PCO2         | 42                       | 35 - 45 mmHg     | [facility] LABORAT[facility] |
+------------------+--------------------------+------------------+-----------------+
| POC p02          | 93                       | 80 - 105 mmHg    | [facility] |
+------------------+--------------------------+------------------+-----------------+
| POC HCO3         | 23                       | 22 - 26 mmol/L   | [facility] |
+------------------+--------------------------+------------------+-----------------+
| POC TCO2         | 24                       | 23 - 27 mEq/L    | [facility] |
+------------------+--------------------------+------------------+-----------------+
| POC BASE DEFICIT | 3 (H)                    | 0.0 - 2.0 mmol/L | [facility] LABORAT[facility] |
+------------------+--------------------------+------------------+-----------------+
| POC S02          | 97                       | 95 - 98 %        | [facility] LABORAT[facility] |
+------------------+--------------------------+------------------+-----------------+
| POC COMMENTS     | Ariel Test not           |                  | [facility] LABORAT[facility] |
 
|                  | indicatedComment: Site = |                  |                 |
|                  |  right radialTesting     |                  |                 |
|                  | performed at [facility]     |                  |                 |
|                  | [address];[place]   |                  |                 |
|                  | [id number]                    |                  |                 |
+------------------+--------------------------+------------------+-----------------+
 
 
 
+-------------------+------------------+--------------------+--------------+
| Performing        | Address          | City/State/Zipcode | Phone Number |
| Organization      |                  |                    |              |
+-------------------+------------------+--------------------+--------------+
|   [facility] LAB[facility] |   [address] | [place] [address] |              |
+-------------------+------------------+--------------------+--------------+
 Respiratory Filmarray (2018 10:34 PM)
 
+----------------------+-------------------------+--------------+--------------+
| Component            | Value                   | Ref Range    | Performed At |
+----------------------+-------------------------+--------------+--------------+
| ADENOVIRUS           | Not Detected            | Not Detected | [facility]   |
|                      |                         |              | LABORATORY   |
+----------------------+-------------------------+--------------+--------------+
| CORONAVIRUS 229E     | Not Detected            | Not Detected | [facility]   |
|                      |                         |              | LABORATORY   |
+----------------------+-------------------------+--------------+--------------+
| CORONAVIRUS HKU1     | Not Detected            | Not Detected | [facility]   |
|                      |                         |              | LABORATORY   |
+----------------------+-------------------------+--------------+--------------+
| CORONAVIRUS NL63     | Not Detected            | Not Detected | [facility]   |
|                      |                         |              | LABORATORY   |
+----------------------+-------------------------+--------------+--------------+
| CORONAVIRUS OC43     | Not Detected            | Not Detected | [facility]   |
|                      |                         |              | LABORATORY   |
+----------------------+-------------------------+--------------+--------------+
| HUMAN                | Not Detected            | Not Detected | [facility]   |
| [facility]      |                         |              | LABORATORY   |
+----------------------+-------------------------+--------------+--------------+
| HUMAN RHINO/ENTERO   | Not Detected            | Not Detected | [facility]   |
|                      |                         |              | LABORATORY   |
+----------------------+-------------------------+--------------+--------------+
| INFLUENZA A          | Not Detected            | Not Detected | [facility]   |
|                      |                         |              | LABORATORY   |
+----------------------+-------------------------+--------------+--------------+
| INFLUENZA B          | Not Detected            | Not Detected | [facility]   |
|                      |                         |              | LABORATORY   |
+----------------------+-------------------------+--------------+--------------+
| PARAINFLUENZA 1      | Not Detected            | Not Detected | [facility]   |
|                      |                         |              | LABORATORY   |
+----------------------+-------------------------+--------------+--------------+
| PARAINFLUENZA 2      | Not Detected            | Not Detected | [facility]   |
|                      |                         |              | LABORATORY   |
+----------------------+-------------------------+--------------+--------------+
| PARAINFLUENZA 3      | Not Detected            | Not Detected | [facility]   |
|                      |                         |              | LABORATORY   |
+----------------------+-------------------------+--------------+--------------+
| PARAINFLUENZA 4      | Not Detected            | Not Detected | [facility]   |
|                      |                         |              | LABORATORY   |
+----------------------+-------------------------+--------------+--------------+
| RESP SYNCYTIAL VIRUS | Not Detected            | Not Detected | [facility]   |
 
|                      |                         |              | LABORATORY   |
+----------------------+-------------------------+--------------+--------------+
| BORDETELLA PERTUSSIS | Not Detected            | Not Detected | [facility]   |
|                      |                         |              | LABORATORY   |
+----------------------+-------------------------+--------------+--------------+
| CHLAMYDIAE           | Not Detected            | Not Detected | [facility]   |
| PNEUMONIAE           |                         |              | LABORATORY   |
+----------------------+-------------------------+--------------+--------------+
| MYCOPLASMA           | Not Detected            | Not Detected | [facility]   |
| PNEUMONIAE           |                         |              | LABORATORY   |
+----------------------+-------------------------+--------------+--------------+
| RESP PANEL INTERP    | Testing performed by    |              | [facility]   |
|                      | Molecular               |              | LABORATORY   |
|                      | MethodologyComment:     |              |              |
|                      | Testing performed at    |              |              |
|                      | [facility], [address]  |              |              |
|                      | [place]     |              |              |
|                      | [address]                   |              |              |
+----------------------+-------------------------+--------------+--------------+
 
 
 
+-----------------+
| Specimen        |
+-----------------+
| Nasopharyngeal  |
| Culture         |
+-----------------+
 
 
 
+---------------+-------------------------+---------------------+----------------+
| Performing    | Address                 | City/State/Zipcode  | Phone Number   |
| Organization  |                         |                     |                |
+---------------+-------------------------+---------------------+----------------+
|   [facility]  |   [address]  | [place] [address] |   [phone] |
| LABORATORY    | [address]                   |                     |                |
+---------------+-------------------------+---------------------+----------------+
 US Lower extremity venous doppler bilateral (2018  9:50 PM)
 
+--------------------------------------------------------------------+--------------+
| Impressions                                                        | Performed At |
+--------------------------------------------------------------------+--------------+
|   1.    No evidence of lower extremity deep vein thrombosis.       |   [facility]     |
| Electronically signed by Mega Tinoco MD on 2018 9:48 PM | RADIOLOGY    |
+--------------------------------------------------------------------+--------------+
 
 
 
+------------------------------------------------------------------------+--------------+
| Narrative                                                              | Performed At |
+------------------------------------------------------------------------+--------------+
|   [facility] System LOWER EXTREMITY VENOUS DOPPLER BILAT        |   [facility]     |
| 2018 9:16 PM     HISTORY:  68 years.    Male.    Swelling of the  | RADIOLOGY    |
| left and right lower extremities.     TECHNIQUE:  Bilateral lower      |              |
| extremity venous exam using grayscale, color Doppler and pulsed wave   |              |
| spectral Doppler techniques.     COMPARISON:  None.     FINDINGS:      |              |
| Normal compressibility, augmentation of flow, and Doppler flow evident |              |
|  within the deep venous system. No evidence of deep venous thrombosis. |              |
|                                                                        |              |
 
+------------------------------------------------------------------------+--------------+
 
 
 
+-------------------------------------------------------------------------------------------
--------------------------------------------------+
| Procedure Note                                                                            
                                                  |
+-------------------------------------------------------------------------------------------
--------------------------------------------------+
|   Edil, Rad Results In - 2018  9:53 PM PDT  JP [facility]VERA LOWER EXTREMITY   
                                                  |
| VENOUS DOPPLER BILAT2018 9:16 PMHISTORY:68 years.  Male.  Swelling of the left and   
                                                  |
| right lower extremities.TECHNIQUE:Bilateral lower extremity venous exam using grayscale,  
                                                  |
|  color Doppler and pulsed wave spectral Doppler                                           
                                                  |
| techniques.COMPARISON:None.FINDINGS:Normal compressibility, augmentation of flow, and     
                                                  |
| Doppler flow evident within the deep venous system. No evidence of deep venous            
                                                  |
| thrombosis.IMPRESSION:1.  No evidence of lower extremity deep vein                        
                                                  |
| thrombosis.Electronically signed by Mega Tinoco MD on 2018 9:48 PM             
                                                  |
|Bilateral lower extremity venous exam using grayscale, color Doppler and pulsed wave spectr
al Doppler techniques.                            |
|                                                                                           
                                                  |
|COMPARISON:                                                                                
                                                 |
|None.                                                                                      
                                                 |
|FINDINGS:                                                                                  
                                                 |
|Normal compressibility, augmentation of flow, and Doppler flow evident within the deep veno
us system. No evidence of deep venous thrombosis. |
|                                                                                           
                                                  |
|IMPRESSION:                                                                                
                                                 |
|1.  No evidence of lower extremity deep vein thrombosis.                                   
                                                  |
|Electronically signed by Mega Tinoco MD on 2018 9:48 PM                         
                                                  |
+-------------------------------------------------------------------------------------------
--------------------------------------------------+
 
 
 
+--------------------+------------------+--------------------+--------------+
| Performing         | Address          | City/State/Zipcode | Phone Number |
| Organization       |                  |                    |              |
+--------------------+------------------+--------------------+--------------+
|   [facility] |   [address] | [place] [address] |              |
 
+--------------------+------------------+--------------------+--------------+
 VERO SOLIS (2018  8:09 PM)
 
+-------------+------------------------+-----------------+-----------------+
| Component   | Value                  | Ref Range       | Performed At    |
+-------------+------------------------+-----------------+-----------------+
| MMB         | <1.0                   | 0.5 - 3.6 ng/mL | [facility] |
+-------------+------------------------+-----------------+-----------------+
| CK-MB Index | UNABLE TO              |                 | [facility] |
|             | CALCULATEComment:      |                 |                 |
|             | Testing performed at   |                 |                 |
|             | [facility]          |                 |                 |
|             | [address];[place] [address] |                 |                 |
+-------------+------------------------+-----------------+-----------------+
 
 
 
+-------------------+------------------+--------------------+--------------+
| Performing        | Address          | City/State/Zipcode | Phone Number |
| Organization      |                  |                    |              |
+-------------------+------------------+--------------------+--------------+
|   [facility] |   [address] | [place] [address] |              |
+-------------------+------------------+--------------------+--------------+
 aPTT (2018  8:09 PM)Only the most recent of 2 results within the time period is inclu
ded.
 
+-----------+-------------------------+-----------------+-----------------+
| Component | Value                   | Ref Range       | Performed At    |
+-----------+-------------------------+-----------------+-----------------+
| APTT      | 31Comment: Testing      | 23 - 32 seconds | [facility] |
|           | performed at [facility]    |                 |                 |
|           | [address];[place]  |                 |                 |
|           | [id number]                   |                 |                 |
+-----------+-------------------------+-----------------+-----------------+
 
 
 
+-------------------+------------------+--------------------+--------------+
| Performing        | Address          | City/State/Zipcode | Phone Number |
| Organization      |                  |                    |              |
+-------------------+------------------+--------------------+--------------+
|   [facility] |   [address] | [place] [address] |              |
+-------------------+------------------+--------------------+--------------+
 Protime-INR (2018  8:09 PM)Only the most recent of 2 results within the time period i
s included.
 
+-----------+--------------------------+-----------+-----------------+
| Component | Value                    | Ref Range | Performed At    |
+-----------+--------------------------+-----------+-----------------+
| INR       | 1.1Comment: REFERENCE    |           | [facility] |
|           | RANGE:0.9 -              |           |                 |
|           | 1.2    NON-ANTICOAGULATE |           |                 |
|           | D2.0 - 3.0    ALL OTHER  |           |                 |
|           | THERAPEUTIC              |           |                 |
|           | INDICATIONS2.5 - 3.5     |           |                 |
|           | MECHANICAL HEART VALVES, |           |                 |
|           |  RECURRENT OR SYSTEMIC   |           |                 |
|           | EMBOLISMTesting          |           |                 |
|           | performed at [facility]     |           |                 |
|           | [address];[place]   |           |                 |
 
|           | [id number]                    |           |                 |
+-----------+--------------------------+-----------+-----------------+
 
 
 
+-------------------+------------------+--------------------+--------------+
| Performing        | Address          | City/State/Zipcode | Phone Number |
| Organization      |                  |                    |              |
+-------------------+------------------+--------------------+--------------+
|   [facility] |   [address] | [place] [address] |              |
+-------------------+------------------+--------------------+--------------+
 last Multani (2018  8:09 PM)
 
+--------------+--------------------------+----------------------+-----------------+
| Component    | Value                    | Ref Range            | Performed At    |
+--------------+--------------------------+----------------------+-----------------+
| D DIMER,     | 0.59 (H)Comment: D Dimer | 0.19 - 0.50 mg/L FEU | [facility] |
| QUANTITATIVE |  results less than 0.50  |                      |                 |
|              | mg/L FEU may rule out    |                      |                 |
|              | DVT and PE.    However,  |                      |                 |
|              | all laboratory results   |                      |                 |
|              | should be interpreted in |                      |                 |
|              |  the context of all      |                      |                 |
|              | available clinical,      |                      |                 |
|              | radiologic and           |                      |                 |
|              | laboratory               |                      |                 |
|              | information.Testing      |                      |                 |
|              | performed at [facility]     |                      |                 |
|              | [facility];[place], [place]   |                      |                 |
|              | [id number]                    |                      |                 |
+--------------+--------------------------+----------------------+-----------------+
 
 
 
+----------+
| Specimen |
+----------+
| Blood    |
+----------+
 
 
 
+-------------------+------------------+--------------------+--------------+
| Performing        | Address          | City/State/Zipcode | Phone Number |
| Organization      |                  |                    |              |
+-------------------+------------------+--------------------+--------------+
|   [facility] |   [address] | [place] [address] |              |
+-------------------+------------------+--------------------+--------------+
 CPK (2018  8:09 PM)
 
+-----------+-------------------------+--------------+-----------------+
| Component | Value                   | Ref Range    | Performed At    |
+-----------+-------------------------+--------------+-----------------+
| CPK       | 51 (L)Comment: Testing  | 55 - 400 U/L | [facility] |
|           | performed at [facility]    |              |                 |
|           | [address];[place]  |              |                 |
|           | [id number]                   |              |                 |
+-----------+-------------------------+--------------+-----------------+
 
 
 
 
+-------------------+------------------+--------------------+--------------+
| Performing        | Address          | City/State/Zipcode | Phone Number |
| Organization      |                  |                    |              |
+-------------------+------------------+--------------------+--------------+
|   [facility] |   [address] | [place] [address] |              |
+-------------------+------------------+--------------------+--------------+
 Cardiac Panel (2018  5:40 PM)Only the most recent of 2 results within the time period
 is included.
 
+-----------------+--------------------------+-------------------+-----------------+
| Component       | Value                    | Ref Range         | Performed At    |
+-----------------+--------------------------+-------------------+-----------------+
| WBC             | 12.67 (H)                | 3.80 - 11.00 K/uL | [facility] |
+-----------------+--------------------------+-------------------+-----------------+
| RBC             | 4.21                     | 4.20 - 5.70 M/uL  | [facility] |
+-----------------+--------------------------+-------------------+-----------------+
| HGB             | 13.5                     | 13.2 - 17.0 g/dL  | [facility] |
+-----------------+--------------------------+-------------------+-----------------+
| HCT             | 40.7                     | 39.0 - 50.0 %     | [facility] |
+-----------------+--------------------------+-------------------+-----------------+
| MCV             | 96.6                     | 80.0 - 100.0 fl   | [facility] |
+-----------------+--------------------------+-------------------+-----------------+
| MCH             | 32.0                     | 27.0 - 34.0 pg    | [facility] |
+-----------------+--------------------------+-------------------+-----------------+
| MCHC            | 33.1                     | 32.0 - 35.5 g/dL  | [facility] |
+-----------------+--------------------------+-------------------+-----------------+
| RDW SD          | 48.6                     | 37 - 53 fl        | [facility] |
+-----------------+--------------------------+-------------------+-----------------+
| PLT             | 194                      | 150 - 400 K/uL    | [facility] |
+-----------------+--------------------------+-------------------+-----------------+
| MPV             | 9.4                      | fl                | [facility] |
+-----------------+--------------------------+-------------------+-----------------+
| DIFF TYPE       | AUTOMATED                |                   | [facility] |
+-----------------+--------------------------+-------------------+-----------------+
| NEUTROPHILS     | 78.22                    | %                 | [facility] |
+-----------------+--------------------------+-------------------+-----------------+
| LYMPHOCYTES     | 11.30                    | %                 | [facility] |
+-----------------+--------------------------+-------------------+-----------------+
| MONOCYTES       | 8.09                     | %                 | [facility] |
+-----------------+--------------------------+-------------------+-----------------+
| EOSINOPHILS     | 1.72                     | %                 | [facility] |
+-----------------+--------------------------+-------------------+-----------------+
| BASOPHILS       | 0.67                     | %                 | [facility] |
+-----------------+--------------------------+-------------------+-----------------+
| NEUTROPHILS ABS | 9.91 (H)                 | 1.90 - 7.40 K/uL  | [facility] |
+-----------------+--------------------------+-------------------+-----------------+
| LYMPHOCYTES ABS | 1.43                     | 1.00 - 3.90 K/uL  | [facility] |
+-----------------+--------------------------+-------------------+-----------------+
| MONOCYTES ABS   | 1.03 (H)                 | 0.00 - 0.80 K/uL  | [facility] |
+-----------------+--------------------------+-------------------+-----------------+
| EOSINOPHILS ABS | 0.22                     | 0.00 - 0.50 K/uL  | [facility] |
+-----------------+--------------------------+-------------------+-----------------+
| BASOPHILS ABS   | 0.09Comment: Testing     | 0.00 - 0.10 K/uL  | [facility] |
|                 | performed at [facility];[address]     |                   |                 |
|                 | [address];[place]   |                   |                 |
|                 | [id number]                    |                   |                 |
+-----------------+--------------------------+-------------------+-----------------+
| SODIUM          | SPECIMEN HEMOLYZED, WILL | 135 - 145 mmol/L  | [facility] |
|                 |  BE REDRAWN RN TO SEND   |                   |                 |
 
+-----------------+--------------------------+-------------------+-----------------+
| POTASSIUM       | SPECIMEN HEMOLYZED, WILL | 3.5 - 4.9 mmol/L  | [facility] |
|                 |  BE REDRAWN RN TO SEND   |                   |                 |
+-----------------+--------------------------+-------------------+-----------------+
| CHLORIDE        | SPECIMEN HEMOLYZED, WILL | 99 - 109 mmol/L   | [facility] |
|                 |  BE REDRAWN RN TO SEND   |                   |                 |
+-----------------+--------------------------+-------------------+-----------------+
| CO2             | SPECIMEN HEMOLYZED, WILL | 23 - 32 mmol/L    | [facility] |
|                 |  BE REDRAWN RN TO SEND   |                   |                 |
+-----------------+--------------------------+-------------------+-----------------+
| ANION GAP AGAP  | SPECIMEN HEMOLYZED, WILL | 5 - 20 mmol/L     | [facility] |
|                 |  BE REDRAWN RN TO SEND   |                   |                 |
+-----------------+--------------------------+-------------------+-----------------+
| GLUCOSE         | SPECIMEN HEMOLYZED, WILL | 65 - 99 mg/dL     | [facility] |
|                 |  BE REDRAWN RN TO SEND   |                   |                 |
+-----------------+--------------------------+-------------------+-----------------+
| BUN             | SPECIMEN HEMOLYZED, WILL | 8 - 25 mg/dL      | [facility] |
|                 |  BE REDRAWN RN TO SEND   |                   |                 |
+-----------------+--------------------------+-------------------+-----------------+
| CREATININE      | SPECIMEN HEMOLYZED, WILL | 0.70 - 1.30 mg/dL | [facility] |
|                 |  BE REDRAWN RN TO SEND   |                   |                 |
+-----------------+--------------------------+-------------------+-----------------+
| BUN/CREAT       | SPECIMEN HEMOLYZED, WILL |                   | [facility] |
|                 |  BE REDRAWN RN TO SEND   |                   |                 |
+-----------------+--------------------------+-------------------+-----------------+
| CALCIUM         | SPECIMEN HEMOLYZED, WILL | 8.5 - 10.5 mg/dL  | [facility] |
|                 |  BE REDRAWN RN TO SEND   |                   |                 |
+-----------------+--------------------------+-------------------+-----------------+
| TOTAL PROTEIN   | SPECIMEN HEMOLYZED, WILL | 6.3 - 8.2 g/dL    | [facility] |
|                 |  BE REDRAWN RN TO SEND   |                   |                 |
+-----------------+--------------------------+-------------------+-----------------+
| Albumin         | SPECIMEN HEMOLYZED, WILL | 3.3 - 4.8 g/dL    | [facility] |
|                 |  BE REDRAWN RN TO SEND   |                   |                 |
+-----------------+--------------------------+-------------------+-----------------+
| GLOBULIN        | SPECIMEN HEMOLYZED, WILL | 1.3 - 4.9 g/dL    | [facility] |
|                 |  BE REDRAWN RN TO SEND   |                   |                 |
+-----------------+--------------------------+-------------------+-----------------+
| A/G             | SPECIMEN HEMOLYZED, WILL | 1.0 - 2.4         | [facility] |
|                 |  BE REDRAWN RN TO SEND   |                   |                 |
+-----------------+--------------------------+-------------------+-----------------+
| TBIL            | SPECIMEN HEMOLYZED, WILL | 0.1 - 1.5 mg/dL   | [facility] |
|                 |  BE REDRAWN RN TO SEND   |                   |                 |
+-----------------+--------------------------+-------------------+-----------------+
| ALK PHOS        | SPECIMEN HEMOLYZED, WILL | 35 - 115 U/L      | [facility] |
|                 |  BE REDRAWN RN TO SEND   |                   |                 |
+-----------------+--------------------------+-------------------+-----------------+
| AST             | SPECIMEN HEMOLYZED, WILL | 10 - 45 U/L       | [facility] |
|                 |  BE REDRAWN RN TO SEND   |                   |                 |
+-----------------+--------------------------+-------------------+-----------------+
| ALT             | SPECIMEN HEMOLYZED, WILL | 10 - 65 U/L       | [facility] |
|                 |  BE REDRAWN RN TO SEND   |                   |                 |
+-----------------+--------------------------+-------------------+-----------------+
| EGFR            | SPECIMEN HEMOLYZED, WILL | >60 mL/min/1.73m2 | [facility] |
|                 |  BE REDRAWN RN TO SEND   |                   |                 |
+-----------------+--------------------------+-------------------+-----------------+
| CPK             | SPECIMEN HEMOLYZED, WILL | 55 - 400 U/L      | [facility] |
|                 |  BE REDRAWN RN TO SEND   |                   |                 |
+-----------------+--------------------------+-------------------+-----------------+
| PROTIME         | SPECIMEN HEMOLYZED, WILL | seconds           | [facility] |
|                 |  BE REDRAWN RN TO SEND   |                   |                 |
 
+-----------------+--------------------------+-------------------+-----------------+
| INR             | SPECIMEN HEMOLYZED, WILL |                   | [facility] |
|                 |  BE REDRAWN RN TO SEND   |                   |                 |
+-----------------+--------------------------+-------------------+-----------------+
| APTT            | SPECIMEN HEMOLYZED, WILL | 23 - 32 seconds   | [facility] |
|                 |  BE REDRAWN RN TO SEND   |                   |                 |
+-----------------+--------------------------+-------------------+-----------------+
| MMB             | SPECIMEN HEMOLYZED, WILL | 0.5 - 3.6 ng/mL   | [facility] |
|                 |  BE REDRAWN RN TO SEND   |                   |                 |
+-----------------+--------------------------+-------------------+-----------------+
| CK-MB Index     | SPECIMEN HEMOLYZED, WILL |                   | [facility] |
|                 |  BE REDRAWN RN TO SEND   |                   |                 |
+-----------------+--------------------------+-------------------+-----------------+
 
 
 
+-------------------+------------------+--------------------+--------------+
| Performing        | Address          | City/State/Zipcode | Phone Number |
| Organization      |                  |                    |              |
+-------------------+------------------+--------------------+--------------+
|   [facility] |   [address] | [place] [address] |              |
+-------------------+------------------+--------------------+--------------+
 BNP (2018  5:40 PM)Only the most recent of 2 results within the time period is includ
ed.
 
+--------------------+-------------------------+---------------+-----------------+
| Component          | Value                   | Ref Range     | Performed At    |
+--------------------+-------------------------+---------------+-----------------+
| BRAIN NATRIURETIC  | 32.9Comment: Testing    | 0 - 100 pg/mL | [facility] |
| PEPTIDE            | performed at [facility]    |               |                 |
|                    | [address];[place]  |               |                 |
|                    | [id number]                   |               |                 |
+--------------------+-------------------------+---------------+-----------------+
 
 
 
+----------+
| Specimen |
+----------+
| Blood    |
+----------+
 
 
 
+-------------------+------------------+--------------------+--------------+
| Performing        | Address          | City/State/Zipcode | Phone Number |
| Organization      |                  |                    |              |
+-------------------+------------------+--------------------+--------------+
|   [facility] |   [address] | [place] [address] |              |
+-------------------+------------------+--------------------+--------------+
 US lower extremity venous right (2018  6:30 PM)
 
+-------------------------------------------------------------------+--------------+
| Impressions                                                       | Performed At |
+-------------------------------------------------------------------+--------------+
|   1.    No evidence of lower extremity deep vein thrombosis.      |   [facility]     |
| Electronically signed by José Andujar MD on 2018 6:32 PM | RADIOLOGY    |
+-------------------------------------------------------------------+--------------+
 
 
 
 
+------------------------------------------------------------------------+--------------+
| Narrative                                                              | Performed At |
+------------------------------------------------------------------------+--------------+
|   [facility] CLAR[facility]   LOWER EXTREMITY VENOUS DOPPLER RIGHT        |   [facility]     |
| 2018 6:30 PM     HISTORY:  68 years.    Male.    Shortness of     | RADIOLOGY    |
| breath with right leg swelling.     TECHNIQUE:  Right lower extremity  |              |
| venous Doppler performed with color Doppler and spectral Doppler       |              |
| waveform analysis. Duplex Doppler analysis.     COMPARISON:  None.     |              |
|  FINDINGS:  Normal compressibility, augmentation of flow, and Doppler  |              |
| flow evident within the deep venous system. No evidence of deep venous |              |
|  thrombosis.                                                           |              |
+------------------------------------------------------------------------+--------------+
 
 
 
+-------------------------------------------------------------------------------------------
--------------------------------------------------+
| Procedure Note                                                                            
                                                  |
+-------------------------------------------------------------------------------------------
--------------------------------------------------+
|   Agustin Solo Results In - 2018  6:37 PM PDT  [facility] LOWER EXTREMITY   
                                                  |
| VENOUS DOPPLER RIGHT2018 6:30 PMHISTORY:68 years.  Male.  Shortness of breath with   
                                                  |
| right leg swelling.TECHNIQUE:Right lower extremity venous Doppler performed with color    
                                                  |
| Doppler and spectral Doppler waveform analysis. Duplex Doppler                            
                                                  |
| analysis.COMPARISON:None.FINDINGS:Normal compressibility, augmentation of flow, and       
                                                  |
| Doppler flow evident within the deep venous system. No evidence of deep venous            
                                                  |
| thrombosis.IMPRESSION:1.  No evidence of lower extremity deep vein                        
                                                  |
| thrombosis.Electronically signed by José Andujar MD on 2018 6:32 PM              
                                                  |
|Right lower extremity venous Doppler performed with color Doppler and spectral Doppler wave
form analysis. Duplex Doppler analysis.           |
|                                                                                           
                                                  |
|COMPARISON:                                                                                
                                                 |
|None.                                                                                      
                                                 |
|FINDINGS:                                                                                  
                                                 |
|Normal compressibility, augmentation of flow, and Doppler flow evident within the deep veno
us system. No evidence of deep venous thrombosis. |
|                                                                                           
                                                  |
|IMPRESSION:                                                                                
                                                 |
|1.  No evidence of lower extremity deep vein thrombosis.                                   
                                                  |
 
|Electronically signed by José Andujar MD on 2018 6:32 PM                          
                                                  |
+-------------------------------------------------------------------------------------------
--------------------------------------------------+
 
 
 
+--------------------+------------------+--------------------+--------------+
| Performing         | Address          | City/State/Zipcode | Phone Number |
| Organization       |                  |                    |              |
+--------------------+------------------+--------------------+--------------+
|   [facility] |   [address] | [place] [address] |              |
+--------------------+------------------+--------------------+--------------+
 POC cardiac troponin (2018  5:05 PM)
 
+----------------------+--------------------------+-------------------+-----------------+
| Component            | Value                    | Ref Range         | Performed At    |
+----------------------+--------------------------+-------------------+-----------------+
| POC CARDIAC TROPONIN | 0.00Comment:   0.00 to   | 0.00 - 0.10 ng/mL | [facility] |
|                      | 0.10    Consistent with  |                   |                 |
|                      | normal population0.11 to |                   |                 |
|                      |  0.40    Consistent with |                   |                 |
|                      |  increased risk for      |                   |                 |
|                      | adverse                  |                   |                 |
|                      | outcomes>0.40            |                   |                 |
|                      |       Consistent with    |                   |                 |
|                      | WHO criteria for Acute   |                   |                 |
|                      | MI Testing performed at  |                   |                 |
|                      | [facility]                 |
|                      [address];[place] [address]   |                   |                 |
+----------------------+--------------------------+-------------------+-----------------+
 
 
 
+-------------------+------------------+--------------------+--------------+
| Performing        | Address          | City/State/Zipcode | Phone Number |
| Organization      |                  |                    |              |
+-------------------+------------------+--------------------+--------------+
|   [facility] |   [address] | [place] [address] |              |
+-------------------+------------------+--------------------+--------------+
 from Last 3 Months
 
 Insurance
 
 
+----------+--------+-------------+------+-------+----------------------+
| Payer    | Benefi | Subscriber  | Type | Phone | Address              |
|          | t Plan | ID          |      |       |                      |
|          |  /     |             |      |       |                      |
|          | Group  |             |      |       |                      |
+----------+--------+-------------+------+-------+----------------------+
| MEDICAID | [place] | [id number]    |      |       |   [address]        |
|          | [address]      |             |      |       | [place], [place]          |
|          | [place] |             |      |       | [address]           |
|          |  [place]   |             |      |       |                      |
+----------+--------+-------------+------+-------+----------------------+
| MEDICARE | MEDICA | [id number]  |      |       |   [address]        |
|          | [place]     |             |      |       | [place] [address] |
|          | IP-OP  |             |      |       |                      |
+----------+--------+-------------+------+-------+----------------------+
 
 
 
 
+----------------------+--------+-------------+--------+-------------+---------------------+
| Guarantor Name       | [employer] | Relation to | Date   | Phone       | Billing Address     |
|                      | t Type |  Patient    | of     |             |                     |
|                      |        |             | Birth  |             |                     |
+----------------------+--------+-------------+--------+-------------+---------------------+
| JP CLARKE | Person | Self        | / |   Home:     |   [address]       |
|                      | al/Fam |             | 1950   | [phone] | [place] [id number] |
|                      | [place]    |             |        | [id number]        |                     |
+----------------------+--------+-------------+--------+-------------+---------------------+

## 2018-09-18 NOTE — XMS
PreManage Notification: KAYLEY CLARKE MRN:[id number]
 
Security Information
 
Security Events
No recent Security Events currently on file
 
 
 
CRITERIA MET
------------
- 6 ED Visits in 6 Months
- [facility] - Has Care Guidelines
- POLST
- [facility] - 3 Facilities in 90 Days
- [facility] - 2 Visits in 30 Days
 
 
CARE PROVIDERS
-------------------------------------------------------------------------------------
MAI RUIZ     Physician Assistant     Current
 
PHONE: [phone]
-------------------------------------------------------------------------------------
[place]-                Current
[facility]
 
PHONE: [phone]
-------------------------------------------------------------------------------------
[facility]            Mental Health Provider     Current
[facility]
 
PHONE: [phone]
-------------------------------------------------------------------------------------
 
Guidelines Source: Chun Albert
Guidelines Date: 06/27/2018
 
Additional Information:
    Currently placed at [facility], 
    please contact Candace Osgood [phone].\T\[facility]; Prescription medication goes
    through [facility], [phone].
 
 
ESAM VISIT COUNT (12 MO.)
-------------------------------------------------------------------------------------
1 [address]
 
1 [facility]
-------------------------------------------------------------------------------------
TOTAL 6
-------------------------------------------------------------------------------------
NOTE: Visits indicate total known visits.
 
ED/UCC VISIT TRACKING (12 MO.)
-------------------------------------------------------------------------------------
09/18/2018 13:47
 
[facility] [place]
 
TYPE: Emergency
 
COMPLAINT:
- SOB/BACK PAIN/FALL
-------------------------------------------------------------------------------------
[facility]2018 13:[facility]
 
[facility]
 
DIAGNOSES:
- Cough
- Unspecified fall, initial encounter
- Weakness
- Fall
- Urinary Tract Infection
- Wedge compression fracture of first lumbar vertebra, initial encounter for closed
fracture
- Altered Mental Status
- increased confusion, dizziness, weakness, and coughing. pt has UTI, MRSA, and
recent hx of pneumonia
-------------------------------------------------------------------------------------
09/07/2018 01:37
[facility]
 
TYPE: Emergency
 
COMPLAINT:
- URO MALE
-------------------------------------------------------------------------------------
07/27/2018 15:48
[facility]
 
[facility]
 
DIAGNOSES:
- Chronic obstructive pulmonary disease with (acute) exacerbation
- Other forms of dyspnea
- Weakness
- Shortness of Breath
-------------------------------------------------------------------------------------
06/26/2018 15:48
[facility]
 
[facility]
 
DIAGNOSES:
- Weakness
- Shortness of Breath
- Weakness
- Lymphedema, not elsewhere classified
-------------------------------------------------------------------------------------
04/06/2018 17:50
[facility]
 
TYPE: Emergency
 
 
DIAGNOSES:
- Shortness of Breath
- Swelling
- Hypoxemia
-------------------------------------------------------------------------------------
 
 
INPATIENT VISIT TRACKING (12 MO.)
-------------------------------------------------------------------------------------
07/27/2018 15:48
[facility]
 
[facility]
 
DIAGNOSES:
- Hypoxemia
- Other forms of dyspnea
- Chronic obstructive pulmonary disease with (acute) exacerbation
- Other specified soft tissue disorders
-------------------------------------------------------------------------------------
 
[email or web address]

## 2018-09-18 NOTE — XMS
Encounter Summary
  Created on: 2018
 
 [place] Jp RIVERA
 External Reference #: [id number]
 : 48
 Sex: Male
 
 Demographics
 
 
+-----------------------+----------------------+
| Address               | [address]          |
|                       | [place]  [address] |
+-----------------------+----------------------+
| Home Phone            | [phone]      |
+-----------------------+----------------------+
| Preferred Language    | Unknown              |
+-----------------------+----------------------+
| Marital Status        |              |
+-----------------------+----------------------+
| Mandaen Affiliation | [id number]                 |
+-----------------------+----------------------+
| Race                  | Unknown              |
+-----------------------+----------------------+
| Ethnic Group          | Unknown              |
+-----------------------+----------------------+
 
 
 Author
 
 
+--------------+-----------------------+
| Author       | [facility] |
+--------------+-----------------------+
| Organization | [facility] |
+--------------+-----------------------+
| Address      | Unknown               |
+--------------+-----------------------+
| Phone        | Unavailable           |
+--------------+-----------------------+
 
 
 
 Support
 
 
+---------------+--------------+---------+-----------------+
| Name          | Relationship | Address | Phone           |
+---------------+--------------+---------+-----------------+
| Delilah Hall | ECON         | Unknown | [phone] |
+---------------+--------------+---------+-----------------+
| Tesha Veloz   | ECON         | Unknown | [phone] |
+---------------+--------------+---------+-----------------+
 
 
 
 Care Team Providers
 
 
 
+-----------------------+------+-----------------+
| Care Team Member Name | Role | Phone           |
+-----------------------+------+-----------------+
| Mai Guo  | PCP  | [phone] |
+-----------------------+------+-----------------+
 
 
 
 Reason for Visit
 
 
+-----------------+----------------------------------------------------------------+
| Reason          | Comments                                                       |
+-----------------+----------------------------------------------------------------+
| Weakness        |                                                                |
+-----------------+----------------------------------------------------------------+
| Altered Mental  |                                                                |
| Status          |                                                                |
+-----------------+----------------------------------------------------------------+
| Cough           | recent pneumonia                                               |
+-----------------+----------------------------------------------------------------+
| Urinary Tract   | on abx                                                         |
| Infection       |                                                                |
+-----------------+----------------------------------------------------------------+
| Fall            | this morning at around 0700 - ground level and landed "on his  |
|                 | butt"                                                          |
+-----------------+----------------------------------------------------------------+
 
 
 
 Encounter Details
 
 
+--------+-----------+----------------------+-----------+----------------------+
| Date   | Type      | Department           | Care Team | Description          |
+--------+-----------+----------------------+-----------+----------------------+
| / | Emergency |   [facility]    |           | Fall, initial        |
|    |           | Medical Center       |           | encounter (Primary   |
|        |           | Emergency Department |           | Dx); Closed          |
|        |           |   [address]     |           | compression fracture |
|        |           | [place] [address]   |           |  of first lumbar     |
|        |           | [phone]         |           | vertebra, initial    |
|        |           |                      |           | encounter (HCC)      |
+--------+-----------+----------------------+-----------+----------------------+
 
 
 
 Social History
 
 
+---------------+-------+-----------+--------+------------------+
| Tobacco Use   | Types | Packs/Day | Years  | Date             |
|               |       |           | Used   |                  |
+---------------+-------+-----------+--------+------------------+
| Former Smoker |       | 3         | 20     | Quit: 1998 |
+---------------+-------+-----------+--------+------------------+
 
 
 
 
+---------------------+---+---+---+
| Smokeless Tobacco:  |   |   |   |
| Never Used          |   |   |   |
+---------------------+---+---+---+
 
 
 
+-------------+-----------+---------+----------+
| Alcohol Use | Drinks/We | oz/Week | Comments |
|             | ek        |         |          |
+-------------+-----------+---------+----------+
| No          |           |         |          |
+-------------+-----------+---------+----------+
 
 
 
+------------------+---------------+
| Sex Assigned at  | Date Recorded |
| Birth            |               |
+------------------+---------------+
| Not on file      |               |
+------------------+---------------+
 as of this encounter
 
 Last Filed Vital Signs
 
 
+-------------------+----------------------+-------------------------+
| Vital Sign        | Reading              | Time Taken              |
+-------------------+----------------------+-------------------------+
| Blood Pressure    | 128/80               | 2018  4:32 PM PDT |
+-------------------+----------------------+-------------------------+
| Pulse             | 88                   | 2018  4:32 PM PDT |
+-------------------+----------------------+-------------------------+
| Temperature       | 36.7   C (98   F)    | 2018  3:10 PM PDT |
+-------------------+----------------------+-------------------------+
| Respiratory Rate  | 20                   | 2018  4:32 PM PDT |
+-------------------+----------------------+-------------------------+
| Oxygen Saturation | 96%                  | 2018  4:32 PM PDT |
+-------------------+----------------------+-------------------------+
| Inhaled Oxygen    | -                    | -                       |
| Concentration     |                      |                         |
+-------------------+----------------------+-------------------------+
| Weight            | 98.9 kg (218 lb 0.6  | 2018  2:02 PM PDT |
|                   | oz)                  |                         |
+-------------------+----------------------+-------------------------+
| Height            | -                    | -                       |
+-------------------+----------------------+-------------------------+
| Body Mass Index   | 32.2                 | 2018  2:02 PM PDT |
+-------------------+----------------------+-------------------------+
 in this encounter
 
 Discharge Instructions
 Jahaira Marroquin PA-C - 2018If blood cultures or urine culture show concerning torrie salcedo, you will receive a phone call in 2-3 days with results.
 The following attachments cannot be sent through Care Everywhere.Fracture, Vertebral Compre
ssion (English)in this encounter
 
 Medications at Time of Discharge
 
 
 
+----------------------+----------------------+----------+---------+----------+----------+
| Medication           | Sig.                 | Disp.    | Refills | Start    | End Date |
|                      |                      |          |         | Date     |          |
+----------------------+----------------------+----------+---------+----------+----------+
|   Acetaminophen 500  | Take 1,000 mg by     |          |         |          |          |
| MG coapsule          | mouth 4 (four) times |          |         |          |          |
|                      |  daily.              |          |         |          |          |
+----------------------+----------------------+----------+---------+----------+----------+
|   albuterol          | Inhale 2 puffs into  |          |         |          |          |
| (PROVENTIL           | the lungs every 4    |          |         |          |          |
| HFA;VENTOLIN HFA)    | (four) hours as      |          |         |          |          |
| 108 (90 Base)        | needed for Wheezing. |          |         |          |          |
| MCG/ACT inhaler      |                      |          |         |          |          |
+----------------------+----------------------+----------+---------+----------+----------+
|   budesonide         | Take 0.5 mg by       |          |         |          |          |
| (PULMICORT) 0.5      | nebulization 2 (two) |          |         |          |          |
| MG/2ML nebulizer     |  times daily.        |          |         |          |          |
| suspension           |                      |          |         |          |          |
+----------------------+----------------------+----------+---------+----------+----------+
|   buPROPion          | Take 300 mg by mouth |          |         |          |          |
| (WELLBUTRIN XL) 300  |  every morning.      |          |         |          |          |
| MG 24 hr tablet      |                      |          |         |          |          |
+----------------------+----------------------+----------+---------+----------+----------+
|   dextromethorphan   | Take 60 mg by mouth  |          |         |          |          |
| polistirex (DELSYM)  | every 12 (twelve)    |          |         |          |          |
| 30 MG/5ML ER         | hours as needed for  |          |         |          |          |
| suspension           | Cough.               |          |         |          |          |
+----------------------+----------------------+----------+---------+----------+----------+
|   ergocalciferol     | Take 50,000 Units by |          |         |          |          |
| (DRISDOL) 33121      |  mouth once a week.  |          |         |          |          |
| units capsule        |                      |          |         |          |          |
+----------------------+----------------------+----------+---------+----------+----------+
|   furosemide (LASIX) | Take 20 mg by mouth  |          |         |          |          |
|  20 MG tablet        | daily.               |          |         |          |          |
+----------------------+----------------------+----------+---------+----------+----------+
|   guaifenesin        | Take 1,200 mg by     |          |         |          |          |
| (MUCINEX MAX) 1200   | mouth every 6 (six)  |          |         |          |          |
| MG 12hr tablet       | hours as needed.     |          |         |          |          |
+----------------------+----------------------+----------+---------+----------+----------+
|                      | Every 4 hours x 5    |   360 mL | 0       | 07/30/20 |          |
| ipratropium-albutero | days then Q4H prn    |          |         | 18       |          |
| l (DUO-NEB) 0.5-2.5  | for SOB or wheezing  |          |         |          |          |
| mg/3mL               |                      |          |         |          |          |
+----------------------+----------------------+----------+---------+----------+----------+
|   L-methylfolate     | Take 15 mg by mouth  |          |         |          |          |
| (DEPLIN) 15 MG       | daily with           |          |         |          |          |
| tablet               | breakfast.           |          |         |          |          |
+----------------------+----------------------+----------+---------+----------+----------+
|   levofloxacin       | Take 1 tablet by     |   5      | 0       | 07/30/20 |          |
| (LEVAQUIN) 750 MG    | mouth daily.         | tablet   |         | 18       |          |
| tablet               |                      |          |         |          |          |
+----------------------+----------------------+----------+---------+----------+----------+
|   lurasidone         | Take 40 mg by mouth  |          |         |          |          |
| (LATUDA) 40 MG       | daily.               |          |         |          |          |
| tablet               |                      |          |         |          |          |
+----------------------+----------------------+----------+---------+----------+----------+
|   Methylcobalamin    | Take 5,000 mcg by    |          |         |          |          |
| (METHYL B-12 PO)     | mouth 2 (two) times  |          |         |          |          |
 
|                      | daily.               |          |         |          |          |
+----------------------+----------------------+----------+---------+----------+----------+
|   OLANZapine         | Take 10 mg by mouth  |          |         |          |          |
| (ZYPREXA) 20 MG      | 2 (two) times daily. |          |         |          |          |
| tablet               |                      |          |         |          |          |
+----------------------+----------------------+----------+---------+----------+----------+
|   omeprazole         | Take 20 mg by mouth  |          |         |          |          |
| (PRILOSEC) 20 MG     | 2 (two) times daily. |          |         |          |          |
| capsule              |                      |          |         |          |          |
+----------------------+----------------------+----------+---------+----------+----------+
|   oxycodone (OXY-IR) | Take 1 capsule by    |   30     | 0       | // |          |
|  5 MG capsule        | mouth every 6 (six)  | capsule  |         | 18       |          |
|                      | hours as needed.     |          |         |          |          |
+----------------------+----------------------+----------+---------+----------+----------+
|   polyethylene       | Take 17 g by mouth   |          |         |          |          |
| glycol (GLYCOLAX)    | daily.               |          |         |          |          |
| packet               |                      |          |         |          |          |
+----------------------+----------------------+----------+---------+----------+----------+
|   predniSONE         | Take 2 tabs x 3 days |   6      | 0       | //20 |          |
| (DELTASONE) 20 MG    |  then stop           | tablet   |         | 18       |          |
| tablet               |                      |          |         |          |          |
+----------------------+----------------------+----------+---------+----------+----------+
|   propranolol        | Take 20 mg by mouth  |          |         |          |          |
| (INDERAL) 20 MG      | 2 (two) times daily. |          |         |          |          |
| tablet               |                      |          |         |          |          |
+----------------------+----------------------+----------+---------+----------+----------+
|   sertraline         | Take  mg by    |          |         |          |          |
| (ZOLOFT) 100 MG      | mouth See Admin      |          |         |          |          |
| tablet               | Instructions. Take   |          |         |          |          |
|                      | 100 mg by mouth      |          |         |          |          |
|                      | every morning and 50 |          |         |          |          |
|                      |  mg by mouth in the  |          |         |          |          |
|                      | afternoon            |          |         |          |          |
+----------------------+----------------------+----------+---------+----------+----------+
|   simvastatin        | Take 40 mg by mouth  |          |         |          |          |
| (ZOCOR) 40 MG tablet | nightly.             |          |         |          |          |
+----------------------+----------------------+----------+---------+----------+----------+
|   topiramate         | Take 25 mg by mouth  |          |         |          |          |
| (TOPAMAX) 25 MG      | 2 (two) times daily. |          |         |          |          |
| tablet               |                      |          |         |          |          |
+----------------------+----------------------+----------+---------+----------+----------+
|                      | Inhale 1 puff into   |   1 each | 11      | 20 |          |
| umeclidinium-vilante | the lungs daily.     |          |         | 18       |          |
| rol (ANORO ELLIPTA)  |                      |          |         |          |          |
| 62.5-25 MCG/INH      |                      |          |         |          |          |
| inhalerIndications:  |                      |          |         |          |          |
| Asthma with COPD     |                      |          |         |          |          |
| (chronic obstructive |                      |          |         |          |          |
|  pulmonary disease)  |                      |          |         |          |          |
| ([facility])                |                      |          |         |          |          |
+----------------------+----------------------+----------+---------+----------+----------+
 as of this encounter
 
 Plan of Treatment
 
 
+--------+---------+-------------+----------------------+-------------+
| Date   | Type    | Specialty   | Care Team            | Description |
+--------+---------+-------------+----------------------+-------------+
| 10/19/ | Office  | Pulmonology |   Ben,          |             |
 
| 2018   | Visit   |             | Edwina Mckeon,   |             |
|        |         |             | MD [address] |             |
|        |         |             |   [place] [address] |             |
|        |         |             |   [phone]       |             |
|        |         |             | [phone] (Fax)   |             |
+--------+---------+-------------+----------------------+-------------+
 
 
 
+---------------------+--------+----------------------+----------------------+
| Name                | Priori | Associated Diagnoses | Date/Time            |
|                     | ty     |                      |                      |
+---------------------+--------+----------------------+----------------------+
| Blood Culture Set 1 | STAT   |                      | 2018  2:35 PM  |
|                     |        |                      | PDT                  |
+---------------------+--------+----------------------+----------------------+
| Blood Culture Set 2 | STAT   |                      | 2018  2:42 PM  |
|                     |        |                      | PDT                  |
+---------------------+--------+----------------------+----------------------+
 as of this encounter
 
 Procedures
 
 
+----------------------+--------+-------------+----------------------+----------------------
+
| Procedure Name       | Priori | Date/Time   | Associated Diagnosis | Comments             
|
|                      | ty     |             |                      |                      
|
+----------------------+--------+-------------+----------------------+----------------------
+
| XR LUMBAR SPINE      | ASAP   | 2018  |                      |   Results for this   
|
| LIMITED 2-3 VIEW     |        |  3:10 PM    |                      | procedure are in the 
|
|                      |        | PDT         |                      |  results section.    
|
+----------------------+--------+-------------+----------------------+----------------------
+
| XR CHEST 2 VIEW      | ASAP   | 2018  |                      |   Results for this   
|
| FRONTAL AND LATERAL  |        |  3:09 PM    |                      | procedure are in the 
|
|                      |        | PDT         |                      |  results section.    
|
+----------------------+--------+-------------+----------------------+----------------------
+
| XR SACRUM AND COCCYX | ASAP   | 2018  |                      |   Results for this   
|
|                      |        |  3:09 PM    |                      | procedure are in the 
|
|                      |        | PDT         |                      |  results section.    
|
+----------------------+--------+-------------+----------------------+----------------------
+
| URINALYSIS (REFLEX   | STAT   | 2018  |                      |   Results for this   
|
| TO                   |        |  2:48 PM    |                      | procedure are in the 
|
 
| MICROSCOPIC/REFLEX   |        | PDT         |                      |  results section.    
|
| TO CULTURE)          |        |             |                      |                      
|
+----------------------+--------+-------------+----------------------+----------------------
+
| BLOOD CULTURE, SET 2 | STAT   | 2018  |                      |                      
|
|                      |        |  2:42 PM    |                      |                      
|
|                      |        | PDT         |                      |                      
|
+----------------------+--------+-------------+----------------------+----------------------
+
| [facility] SEPTIC LACTIC   | STAT   | 2018  |                      |   Results for this   
|
| ACID                 |        |  2:35 PM    |                      | procedure are in the 
|
|                      |        | PDT         |                      |  results section.    
|
+----------------------+--------+-------------+----------------------+----------------------
+
| TROPONIN I           | STAT   | 2018  |                      |   Results for this   
|
|                      |        |  2:35 PM    |                      | procedure are in the 
|
|                      |        | PDT         |                      |  results section.    
|
+----------------------+--------+-------------+----------------------+----------------------
+
| BLOOD CULTURE, SET 1 | STAT   | 2018  |                      |                      
|
|                      |        |  2:35 PM    |                      |                      
|
|                      |        | PDT         |                      |                      
|
+----------------------+--------+-------------+----------------------+----------------------
+
| CBC W/AUTO DIFF      | STAT   | 2018  |                      |   Results for this   
|
| (REFLEX TO MANUAL)   |        |  2:35 PM    |                      | procedure are in the 
|
|                      |        | PDT         |                      |  results section.    
|
+----------------------+--------+-------------+----------------------+----------------------
+
| COMPREHENSIVE        | STAT   | 2018  |                      |   Results for this   
|
| METABOLIC PANEL      |        |  2:35 PM    |                      | procedure are in the 
|
|                      |        | PDT         |                      |  results section.    
|
+----------------------+--------+-------------+----------------------+----------------------
+
| EKG 12 LEAD UNIT     | Routin | 2018  |                      |   Results for this   
|
| PERFORMED            | e      |  2:33 PM    |                      | procedure are in the 
|
|                      |        | PDT         |                      |  results section.    
|
 
+----------------------+--------+-------------+----------------------+----------------------
+
| NEBULIZER/INHALATION | STAT   | 2018  |                      |                      
|
|  TREATMENT           |        |  2:30 PM    |                      |                      
|
|                      |        | PDT         |                      |                      
|
+----------------------+--------+-------------+----------------------+----------------------
+
| ED INFORMATION       | Routin | 2018  |                      |   Results for this   
|
| EXCHANGE             | e      |  1:59 PM    |                      | procedure are in the 
|
|                      |        | PDT         |                      |  results section.    
|
+----------------------+--------+-------------+----------------------+----------------------
+
 in this encounter
 
 Results
 XR Lumbar Spine 3 View (2018  3:10 PM)
 
+------------------------------------------------------------------------+--------------+
| Impressions                                                            | Performed At |
+------------------------------------------------------------------------+--------------+
|   1.    Lumbar spine: Mild compression of the superior endplate of L1  |   [facility]     |
| of approximately 20%, age indeterminate with mild spondylotic changes  | RADIOLOGY    |
| of the spine.  2.    No evidence of sacrococcygeal fracture.           |              |
| Electronically signed by Eddie Lerma MD on 2018 3:37 PM        |              |
+------------------------------------------------------------------------+--------------+
 
 
 
+------------------------------------------------------------------------+--------------+
| Narrative                                                              | Performed At |
+------------------------------------------------------------------------+--------------+
|   HISTORY:  Sacrococcygeal injury. Lumbar spine injury. Fall.          |   [facility]     |
| COMPARISON:  None.     TECHNIQUE:  AP, lateral films of the lumbar     | RADIOLOGY    |
| spine.  AP, angled AP, lateral films of the sacrum and coccyx.         |              |
| FINDINGS:  Lumbar spine:  Mild diffuse osteopenia. Compression of the  |              |
| superior endplate of L1 of approximately 10-20%, age indeterminate.    |              |
| Coil in the right paraspinal T12 level. Mild L5-S1, L4-L5 degenerative |              |
|  facet changes.     Sacrum:  SI joints unremarkable. No definable      |              |
| sacral or coccygeal fracture. Oval density in the low pelvis probably  |              |
| represents partially distended urinary bladder.                        |              |
+------------------------------------------------------------------------+--------------+
 
 
 
+-------------------------------------------------------------------------------------------
--------------------------------------------------------------------------------------------
---------------+
| Procedure Note                                                                            
                                                                                            
               |
+-------------------------------------------------------------------------------------------
--------------------------------------------------------------------------------------------
---------------+
|   Edil, Rad Results In - 2018  3:42 PM PDT  HISTORY:Sacrococcygeal injury. Lumbar    
 
                                                                                            
               |
| spine injury. Fall.COMPARISON:None.TECHNIQUE:AP, lateral films of the lumbar spine.AP,    
                                                                                            
               |
| angled AP, lateral films of the sacrum and coccyx.FINDINGS:Lumbar spine:Mild diffuse      
                                                                                            
               |
| osteopenia. Compression of the superior endplate of L1 of approximately 10-20%, age       
                                                                                            
               |
| indeterminate. Coil in the right paraspinal T12 level. Mild L5-S1, L4-L5 degenerative     
                                                                                            
               |
| facet changes.Sacrum:SI joints unremarkable. No definable sacral or coccygeal fracture.   
                                                                                            
               |
| Oval density in the low pelvis probably represents partially distended urinary            
                                                                                            
               |
| bladder.IMPRESSION:1.  Lumbar spine: Mild compression of the superior endplate of L1 of   
                                                                                            
               |
| approximately 20%, age indeterminate with mild spondylotic changes of the spine.2.  No    
                                                                                            
               |
| evidence of sacrococcygeal fracture.Electronically signed by Eddie Lerma MD on         
                                                                                            
               |
| 2018 3:37 PM                                                                         
                                                                                            
               |
|Lumbar spine:                                                                              
                                                                                            
               |
|Mild diffuse osteopenia. Compression of the superior endplate of L1 of approximately 10-20%
, age indeterminate. Coil in the right paraspinal T12 level. Mild L5-S1, L4-L5 degenerative 
facet changes. |
|                                                                                           
                                                                                            
               |
|Sacrum:                                                                                    
                                                                                            
              |
|SI joints unremarkable. No definable sacral or coccygeal fracture. Oval density in the low 
pelvis probably represents partially distended urinary bladder.                             
               |
|                                                                                           
                                                                                            
               |
|IMPRESSION:                                                                                
                                                                                            
              |
|1.  Lumbar spine: Mild compression of the superior endplate of L1 of approximately 20%, age
 indeterminate with mild spondylotic changes of the spine.                                  
               |
|2.  No evidence of sacrococcygeal fracture.                                                
                                                                                            
               |
|                                                                                           
 
                                                                                            
               |
|Electronically signed by Eddie Lerma MD on 2018 3:37 PM                            
                                                                                            
               |
+-------------------------------------------------------------------------------------------
--------------------------------------------------------------------------------------------
---------------+
 
 
 
+--------------------+------------------+--------------------+--------------+
| Performing         | Address          | City/State/Zipcode | Phone Number |
| Organization       |                  |                    |              |
+--------------------+------------------+--------------------+--------------+
|   [facility] |   [address] | [place] [address] |              |
+--------------------+------------------+--------------------+--------------+
 XR Chest PA and Lateral (2018  3:09 PM)
 
+------------------------------------------------------------------------+--------------+
| Impressions                                                            | Performed At |
+------------------------------------------------------------------------+--------------+
|   1.    Mild bilateral perihilar and bibasilar atelectasis.            |   [facility]     |
| 2.    Distention of the stomach.     Electronically signed by Eddie GONZALEZ  | RADIOLOGY    |
| MD Florentin on 2018 3:35 PM                                         |              |
+------------------------------------------------------------------------+--------------+
 
 
 
+------------------------------------------------------------------------+--------------+
| Narrative                                                              | Performed At |
+------------------------------------------------------------------------+--------------+
|   HISTORY:  Cough. Congestion. Question pneumonia.     COMPARISON:     |   [facility]     |
| 18.     TECHNIQUE:  AP and lateral films of the chest.           | RADIOLOGY    |
| FINDINGS:  Heart size is normal. Mild ectasia and tortuosity of the    |              |
| thoracic aorta. Minimal strandy change in both lung bases along the    |              |
| hemidiaphragms consistent with atelectasis. Subtle bilateral perihilar |              |
|  atelectasis. No infiltrates. No effusions. Mild degenerative changes  |              |
| of the spine. Distention of the stomach.                               |              |
+------------------------------------------------------------------------+--------------+
 
 
 
+-------------------------------------------------------------------------------------------
--------------------------------------------------------------------------------------------
------------------------------+
| Procedure Note                                                                            
                                                                                            
                              |
+-------------------------------------------------------------------------------------------
--------------------------------------------------------------------------------------------
------------------------------+
|   Edil, Rad Results In - 2018  3:40 PM PDT  HISTORY:Cough. Congestion. Question      
                                                                                            
                              |
| pneumonia.COMPARISON:18.TECHNIQUE:AP and lateral films of the chest.FINDINGS:Heart  
                                                                                            
                              |
|  size is normal. Mild ectasia and tortuosity of the thoracic aorta. Minimal strandy       
                                                                                            
 
                              |
| change in both lung bases along the hemidiaphragms consistent with atelectasis. Subtle    
                                                                                            
                              |
| bilateral perihilar atelectasis. No infiltrates. No effusions. Mild degenerative changes  
                                                                                            
                              |
|  of the spine. Distention of the stomach.IMPRESSION:1.  Mild bilateral perihilar and      
                                                                                            
                              |
| bibasilar atelectasis.2.  Distention of the stomach.Electronically signed by Eddie GONZALEZ      
                                                                                            
                              |
| MD Florentin on 2018 3:35 PM                                                            
                                                                                            
                              |
|                                                                                           
                                                                                            
                              |
|FINDINGS:                                                                                  
                                                                                            
                             |
|Heart size is normal. Mild ectasia and tortuosity of the thoracic aorta. Minimal strandy ch
phyllis in both lung bases along the hemidiaphragms consistent with atelectasis. Subtle bilater
al perihilar atelectasis. No  |
|infiltrates. No effusions. Mild degenerative changes of the spine. Distention of the stomac
h.                                                                                          
                             |
|                                                                                           
                                                                                            
                              |
|IMPRESSION:                                                                                
                                                                                            
                             |
|1.  Mild bilateral perihilar and bibasilar atelectasis.                                    
                                                                                            
                              |
|2.  Distention of the stomach.                                                             
                                                                                            
                              |
|                                                                                           
                                                                                            
                              |
|Electronically signed by Eddie Lerma MD on 2018 3:35 PM                            
                                                                                            
                              |
+-------------------------------------------------------------------------------------------
--------------------------------------------------------------------------------------------
------------------------------+
 
 
 
+--------------------+------------------+--------------------+--------------+
| Performing         | Address          | City/State/Zipcode | Phone Number |
| Organization       |                  |                    |              |
+--------------------+------------------+--------------------+--------------+
|   [facility] |   [address] | [place] [address] |              |
+--------------------+------------------+--------------------+--------------+
 XR Sacrum and Coccyx (2018  3:09 PM)
 
 
+------------------------------------------------------------------------+--------------+
| Impressions                                                            | Performed At |
+------------------------------------------------------------------------+--------------+
|   1.    Lumbar spine: Mild compression of the superior endplate of L1  |   [facility]     |
| of approximately 20%, age indeterminate with mild spondylotic changes  | RADIOLOGY    |
| of the spine.  2.    No evidence of sacrococcygeal fracture.           |              |
| Electronically signed by Eddie Lerma MD on 2018 3:37 PM        |              |
+------------------------------------------------------------------------+--------------+
 
 
 
+------------------------------------------------------------------------+--------------+
| Narrative                                                              | Performed At |
+------------------------------------------------------------------------+--------------+
|   HISTORY:  Sacrococcygeal injury. Lumbar spine injury. Fall.          |   [facility]     |
| COMPARISON:  None.     TECHNIQUE:  AP, lateral films of the lumbar     | RADIOLOGY    |
| spine.  AP, angled AP, lateral films of the sacrum and coccyx.         |              |
| FINDINGS:  Lumbar spine:  Mild diffuse osteopenia. Compression of the  |              |
| superior endplate of L1 of approximately 10-20%, age indeterminate.    |              |
| Coil in the right paraspinal T12 level. Mild L5-S1, L4-L5 degenerative |              |
|  facet changes.     Sacrum:  SI joints unremarkable. No definable      |              |
| sacral or coccygeal fracture. Oval density in the low pelvis probably  |              |
| represents partially distended urinary bladder.                        |              |
+------------------------------------------------------------------------+--------------+
 
 
 
+-------------------------------------------------------------------------------------------
--------------------------------------------------------------------------------------------
---------------+
| Procedure Note                                                                            
                                                                                            
               |
+-------------------------------------------------------------------------------------------
--------------------------------------------------------------------------------------------
---------------+
|   Edil, Rad Results In - 2018  3:42 PM PDT  HISTORY:Sacrococcygeal injury. Lumbar    
                                                                                            
               |
| spine injury. Fall.COMPARISON:None.TECHNIQUE:AP, lateral films of the lumbar spine.AP,    
                                                                                            
               |
| angled AP, lateral films of the sacrum and coccyx.FINDINGS:Lumbar spine:Mild diffuse      
                                                                                            
               |
| osteopenia. Compression of the superior endplate of L1 of approximately 10-20%, age       
                                                                                            
               |
| indeterminate. Coil in the right paraspinal T12 level. Mild L5-S1, L4-L5 degenerative     
                                                                                            
               |
| facet changes.Sacrum:SI joints unremarkable. No definable sacral or coccygeal fracture.   
                                                                                            
               |
| Oval density in the low pelvis probably represents partially distended urinary            
                                                                                            
               |
| bladder.IMPRESSION:1.  Lumbar spine: Mild compression of the superior endplate of L1 of   
                                                                                            
               |
 
| approximately 20%, age indeterminate with mild spondylotic changes of the spine.2.  No    
                                                                                            
               |
| evidence of sacrococcygeal fracture.Electronically signed by Eddie Lerma MD on         
                                                                                            
               |
| 2018 3:37 PM                                                                         
                                                                                            
               |
|Lumbar spine:                                                                              
                                                                                            
               |
|Mild diffuse osteopenia. Compression of the superior endplate of L1 of approximately 10-20%
, age indeterminate. Coil in the right paraspinal T12 level. Mild L5-S1, L4-L5 degenerative 
facet changes. |
|                                                                                           
                                                                                            
               |
|Sacrum:                                                                                    
                                                                                            
              |
|SI joints unremarkable. No definable sacral or coccygeal fracture. Oval density in the low 
pelvis probably represents partially distended urinary bladder.                             
               |
|                                                                                           
                                                                                            
               |
|IMPRESSION:                                                                                
                                                                                            
              |
|1.  Lumbar spine: Mild compression of the superior endplate of L1 of approximately 20%, age
 indeterminate with mild spondylotic changes of the spine.                                  
               |
|2.  No evidence of sacrococcygeal fracture.                                                
                                                                                            
               |
|                                                                                           
                                                                                            
               |
|Electronically signed by Eddie Lerma MD on 2018 3:37 PM                            
                                                                                            
               |
+-------------------------------------------------------------------------------------------
--------------------------------------------------------------------------------------------
---------------+
 
 
 
+--------------------+------------------+--------------------+--------------+
| Performing         | Address          | City/State/Zipcode | Phone Number |
| Organization       |                  |                    |              |
+--------------------+------------------+--------------------+--------------+
|   [facility] |   [address] | [place] [address] |              |
+--------------------+------------------+--------------------+--------------+
 Urinalysis (reflex to micro/reflex to culture) (2018  2:48 PM)
 
+----------------------+--------------------------+----------------+-----------------+
| Component            | Value                    | Ref Range      | Performed At    |
+----------------------+--------------------------+----------------+-----------------+
| COLOR UA             | YELLOW                   |                | [facility] |
 
+----------------------+--------------------------+----------------+-----------------+
| CLARITY              | HAZY                     |                | [facility] |
+----------------------+--------------------------+----------------+-----------------+
| Specific [place], UA | 1.013                    | 1.002 - 1.030  | [facility] |
+----------------------+--------------------------+----------------+-----------------+
| LEUKOCYTE ESTERASE   | NEGATIVE                 | NEGATIVE       | [facility] |
+----------------------+--------------------------+----------------+-----------------+
| NITRITE              | NEGATIVE                 | NEGATIVE       | [facility] |
+----------------------+--------------------------+----------------+-----------------+
| UROBILINOGEN         | NORMAL                   | <1.1 mg/dL     | [facility] |
+----------------------+--------------------------+----------------+-----------------+
| PROTEIN              | NEGATIVE                 | NEGATIVE mg/dL | [facility] |
+----------------------+--------------------------+----------------+-----------------+
| PH,URINE             | 7.0                      | 5.0 - 8.0      | [facility] |
+----------------------+--------------------------+----------------+-----------------+
| BLOOD                | NEGATIVE                 | NEGATIVE       | [facility] |
+----------------------+--------------------------+----------------+-----------------+
| KETONES              | NEGATIVE                 | NEGATIVE mg/dL | [facility] |
+----------------------+--------------------------+----------------+-----------------+
| BILIRUBIN            | NEGATIVE                 | NEGATIVE       | [facility] |
+----------------------+--------------------------+----------------+-----------------+
| GLUCOSE              | NEGATIVEComment: Testing | NEGATIVE mg/dL | [facility] |
|                      |  performed at [facility];[address]    |                |                 |
|                      | [address];[place]   |                |                 |
|                      | [address]                    |                |                 |
+----------------------+--------------------------+----------------+-----------------+
 
 
 
+--------------------+
| Specimen           |
+--------------------+
| Urine, Clean Catch |
+--------------------+
 
 
 
+-------------------+------------------+--------------------+--------------+
| Performing        | Address          | City/State/Zipcode | Phone Number |
| Organization      |                  |                    |              |
+-------------------+------------------+--------------------+--------------+
|   [facility] |   [address] | [place] [address] |              |
+-------------------+------------------+--------------------+--------------+
 Septic Lactic Acid (2018  2:35 PM)
 
+-------------+-------------------------+------------------+-----------------+
| Component   | Value                   | Ref Range        | Performed At    |
+-------------+-------------------------+------------------+-----------------+
| LACTIC ACID | 1.3Comment: Testing     | 0.4 - 2.0 mmol/L | [facility] |
|             | performed at [facility];[address]    |                  |                 |
|             | [address];[place]  |                  |                 |
|             | [id number]                   |                  |                 |
+-------------+-------------------------+------------------+-----------------+
 
 
 
+-------------------+------------------+--------------------+--------------+
| Performing        | Address          | City/State/Zipcode | Phone Number |
| Organization      |                  |                    |              |
+-------------------+------------------+--------------------+--------------+
 
|   [facility] |   [address] | [place] [address] |              |
+-------------------+------------------+--------------------+--------------+
 Troponin I, Lab (2018  2:35 PM)
 
+------------+--------------------------+-------------------+-----------------+
| Component  | Value                    | Ref Range         | Performed At    |
+------------+--------------------------+-------------------+-----------------+
| TROPONIN I | <0.020Comment:   0.00 to | 0.00 - 0.10 ng/mL | [facility] |
|            |  0.10     CONSISTENT     |                   |                 |
|            | WITH NORMAL              |                   |                 |
|            | POPULATION0.11 to        |                   |                 |
|            | 0.60     CONSISTENT WITH |                   |                 |
|            |  INCREASED RISK FOR      |                   |                 |
|            | ADVERSE OUTCOMES>        |                   |                 |
|            | 0.60                     |                   |                 |
|            | CONSISTENT WITH WHO      |                   |                 |
|            | CRITERIA FOR ACUTE MI    |                   |                 |
|            | Testing performed at     |                   |                 |
|            | [facility];[address] [place] [address]            |                   |                 |
|            | Blvd;[place] [address]   |                   |                 |
+------------+--------------------------+-------------------+-----------------+
 
 
 
+----------+
| Specimen |
+----------+
| Blood    |
+----------+
 
 
 
+-------------------+------------------+--------------------+--------------+
| Performing        | Address          | City/State/Zipcode | Phone Number |
| Organization      |                  |                    |              |
+-------------------+------------------+--------------------+--------------+
|   [facility] |   [address] | [place] [address] |              |
+-------------------+------------------+--------------------+--------------+
 Comprehensive metabolic panel (2018  2:35 PM)
 
+----------------+--------------------------+-------------------+-----------------+
| Component      | Value                    | Ref Range         | Performed At    |
+----------------+--------------------------+-------------------+-----------------+
| SODIUM         | 140                      | 135 - 145 mmol/L  | [facility] |
+----------------+--------------------------+-------------------+-----------------+
| POTASSIUM      | 3.8                      | 3.5 - 4.9 mmol/L  | [facility] |
+----------------+--------------------------+-------------------+-----------------+
| CHLORIDE       | 106                      | 99 - 109 mmol/L   | [facility] |
+----------------+--------------------------+-------------------+-----------------+
| CO2            | 28                       | 23 - 32 mmol/L    | [facility] |
+----------------+--------------------------+-------------------+-----------------+
| ANION GAP AGAP | 10                       | 5 - 20 mmol/L     | [facility] |
+----------------+--------------------------+-------------------+-----------------+
| GLUCOSE        | 149 (H)                  | 65 - 99 mg/dL     | [facility] |
+----------------+--------------------------+-------------------+-----------------+
| BUN            | 14                       | 8 - 25 mg/dL      | [facility] |
+----------------+--------------------------+-------------------+-----------------+
| CREATININE     | 0.84                     | 0.70 - 1.30 mg/dL | [facility] |
+----------------+--------------------------+-------------------+-----------------+
| BUN/CREAT      | 17                       |                   | [facility] |
 
+----------------+--------------------------+-------------------+-----------------+
| CALCIUM        | 8.3 (L)                  | 8.5 - 10.5 mg/dL  | [facility] |
+----------------+--------------------------+-------------------+-----------------+
| TOTAL PROTEIN  | 7.1                      | 6.3 - 8.2 g/dL    | [facility] |
+----------------+--------------------------+-------------------+-----------------+
| Albumin        | 3.1 (L)                  | 3.3 - 4.8 g/dL    | [facility] |
+----------------+--------------------------+-------------------+-----------------+
| GLOBULIN       | 4.1                      | 1.3 - 4.9 g/dL    | [facility] |
+----------------+--------------------------+-------------------+-----------------+
| A/G            | 0.8 (L)                  | 1.0 - 2.4         | [facility] |
+----------------+--------------------------+-------------------+-----------------+
| TBIL           | 0.3                      | 0.1 - 1.5 mg/dL   | [facility] |
+----------------+--------------------------+-------------------+-----------------+
| ALK PHOS       | 80                       | 35 - 115 U/L      | [facility] |
+----------------+--------------------------+-------------------+-----------------+
| AST            | 19                       | 10 - 45 U/L       | [facility] |
+----------------+--------------------------+-------------------+-----------------+
| ALT            | 25                       | 10 - 65 U/L       | [facility] |
+----------------+--------------------------+-------------------+-----------------+
| EGFR           | >60Comment: GFR <60:     | >60 mL/min/1.73m2 | [facility] |
|                | CHRONIC KIDNEY DISEASE,  |                   |                 |
|                | IF FOUND OVER A 3 MONTH  |                   |                 |
|                | PERIOD.GFR <15: KIDNEY   |                   |                 |
|                | FAILURE.FOR       |                   |                 |
|                | [employer]IC[employer], MULTIPLY THE  |                   |                 |
|                | CALCULATED GFR BY        |                   |                 |
|                | 1.210.This eGFR is       |                   |                 |
|                | calculated using the     |                   |                 |
|                | MDRD IDMS traceable      |                   |                 |
|                | equation.Testing         |                   |                 |
|                | performed at [facility]     |                   |                 |
|                | [facility];[place], [place]   |                   |                 |
|                | [address]                    |                   |                 |
+----------------+--------------------------+-------------------+-----------------+
 
 
 
+----------+
| Specimen |
+----------+
| Blood    |
+----------+
 
 
 
+-------------------+------------------+--------------------+--------------+
| Performing        | Address          | City/State/Zipcode | Phone Number |
| Organization      |                  |                    |              |
+-------------------+------------------+--------------------+--------------+
|   [facility] |   [address] | [facility] [place] [address] |              |
+-------------------+------------------+--------------------+--------------+
 CBC with differential (2018  2:35 PM)
 
+-----------------+-------------------------+-------------------+-----------------+
| Component       | Value                   | Ref Range         | Performed At    |
+-----------------+-------------------------+-------------------+-----------------+
| WBC             | 9.14                    | 3.80 - 11.00 K/uL | [facility] |
+-----------------+-------------------------+-------------------+-----------------+
| RBC             | 4.42                    | 4.20 - 5.70 M/uL  | [facility] |
+-----------------+-------------------------+-------------------+-----------------+
 
| HGB             | 14.0                    | 13.2 - 17.0 g/dL  | [facility] |
+-----------------+-------------------------+-------------------+-----------------+
| HCT             | 42.5                    | 39.0 - 50.0 %     | [facility] |
+-----------------+-------------------------+-------------------+-----------------+
| MCV             | 96.1                    | 80.0 - 100.0 fl   | [facility] |
+-----------------+-------------------------+-------------------+-----------------+
| MCH             | 31.6                    | 27.0 - 34.0 pg    | [facility] |
+-----------------+-------------------------+-------------------+-----------------+
| MCHC            | 32.9                    | 32.0 - 35.5 g/dL  | [facility] |
+-----------------+-------------------------+-------------------+-----------------+
| RDW SD          | 45.5                    | 37 - 53 fl        | [facility] |
+-----------------+-------------------------+-------------------+-----------------+
| PLT             | 317                     | 150 - 400 K/uL    | [facility] |
+-----------------+-------------------------+-------------------+-----------------+
| MPV             | 8.6                     | fl                | [email or web address] |
+-----------------+-------------------------+-------------------+-----------------+
| DIFF TYPE       | AUTOMATED               |                   | [facility] |
+-----------------+-------------------------+-------------------+-----------------+
| NEUTROPHILS     | 80.59                   | %                 | [facility] |
+-----------------+-------------------------+-------------------+-----------------+
| LYMPHOCYTES     | 11.41                   | %                 | [facility] |
+-----------------+-------------------------+-------------------+-----------------+
| MONOCYTES       | 5.80                    | %                 | [facility] |
+-----------------+-------------------------+-------------------+-----------------+
| EOSINOPHILS     | 1.45                    | %                 | [facility] |
+-----------------+-------------------------+-------------------+-----------------+
| BASOPHILS       | 0.75                    | %                 | [facility] |
+-----------------+-------------------------+-------------------+-----------------+
| NEUTROPHILS ABS | 7.36                    | 1.90 - 7.40 K/uL  | [facility] |
+-----------------+-------------------------+-------------------+-----------------+
| LYMPHOCYTES ABS | 1.04                    | 1.00 - 3.90 K/uL  | [facility] |
+-----------------+-------------------------+-------------------+-----------------+
| MONOCYTES ABS   | 0.53                    | 0.00 - 0.80 K/uL  | [facility] |
+-----------------+-------------------------+-------------------+-----------------+
| EOSINOPHILS ABS | 0.13                    | 0.00 - 0.50 K/uL  | [facility] |
+-----------------+-------------------------+-------------------+-----------------+
| BASOPHILS ABS   | 0.07Comment: Testing    | 0.00 - 0.10 K/uL  | [facility] |
|                 | performed at [facility];[address]    |                   |                 |
|                 | [address];[place]  |                   |                 |
|                 | [address]                   |                   |                 |
+-----------------+-------------------------+-------------------+-----------------+
 
 
 
+----------+
| Specimen |
+----------+
| Blood    |
+----------+
 
 
 
+-------------------+------------------+--------------------+--------------+
| Performing        | Address          | City/State/Zipcode | Phone Number |
| Organization      |                  |                    |              |
+-------------------+------------------+--------------------+--------------+
|   [facility] |   [address] | [place] [address] |              |
+-------------------+------------------+--------------------+--------------+
 EKG 12 LEAD UNIT PERFORMED (2018  2:33 PM)
 
 
+-------------------+-------------------------+-----------+--------------+
| Component         | Value                   | Ref Range | Performed At |
+-------------------+-------------------------+-----------+--------------+
| Ventricular Rate  | 90                      | BPM       | [facility]     |
+-------------------+-------------------------+-----------+--------------+
| Atrial Rate       | 90                      | BPM       | [facility]     |
+-------------------+-------------------------+-----------+--------------+
| P-R Interval      | 152                     | ms        | [facility]     |
+-------------------+-------------------------+-----------+--------------+
| QRS Duration      | 80                      | ms        | [facility]     |
+-------------------+-------------------------+-----------+--------------+
| Q-T Interval      | 354                     | ms        | [facility]     |
+-------------------+-------------------------+-----------+--------------+
| QTC Calculation   | 433                     | ms        | [facility]     |
| (Bezet)           |                         |           |              |
+-------------------+-------------------------+-----------+--------------+
| Calculated P Axis | 46                      | degrees   | [facility]     |
+-------------------+-------------------------+-----------+--------------+
| Calculated R Axis | 25                      | degrees   | [facility]     |
+-------------------+-------------------------+-----------+--------------+
| Calculated T Axis | 40                      | degrees   | [facility]     |
+-------------------+-------------------------+-----------+--------------+
| Diagnosis         | Normal sinus            |           | [facility]     |
|                   | rhythmNormal ECGWhen    |           |              |
|                   | compared with ECG of    |           |              |
|                   | 2018 17:31,No    |           |              |
|                   | significant change was  |           |              |
|                   | foundThis ECG contains  |           |              |
|                   | Unconfirmed             |           |              |
|                   | Interpretation          |           |              |
|                   | Statements.    See ED   |           |              |
|                   | Record for Physician    |           |              |
|                   | Interpretation.         |           |              |
|                   | Confirmed by MUSE READ  |           |              |
|                   | ONLY, -COMPUTER ([id number]),  |           |              |
|                   |  Meryl Kern  |           |              |
|                   | (79) on 2018       |           |              |
|                   | 2:52:05 AM              |           |              |
+-------------------+-------------------------+-----------+--------------+
 
 
 
+--------------+-------------------+--------------------+--------------+
| Performing   | Address           | City/State/Zipcode | Phone Number |
| Organization |                   |                    |              |
+--------------+-------------------+--------------------+--------------+
|   [facility]   |   [address] | [place] [address] |              |
+--------------+-------------------+--------------------+--------------+
 ED INFORMATION EXCHANGE (2018  1:59 PM)
 
+------------------------------------------------------------------------+--------------+
| Narrative                                                              | Performed At |
+------------------------------------------------------------------------+--------------+
|   [id number]     This patient has registered at the   |   ED         |
| [facility] Emergency Department   For more         | INFORMATION  |
| information visit:                                                     | EXCHANGE     |
| [email or web address] |              |
| [id number]   Security Events  No recent Security Events currently on file  |              |
|     ED Care Guidelines from [facility]  Last Updated: 18 |              |
|  9:04 AM         Additional Information:  Currently placed at [facility] |              |
 
|  [facility], please contact Gaby  |              |
| Osgood [phone].Prescription medication goes through [facility], |              |
|  [phone].  These are guidelines and the provider should exercise  |              |
| clinical judgment when providing care.     Recent Emergency Department |              |
|  Visit Summary  Admit Date Facility City State Type Major Type         |              |
| Diagnoses or Chief Complaint   [facility]       |              |
| [place] [facility]    Emergency          increased confusion,         |              |
| dizziness, weakness, and coughing. pt has UTI, MRSA, and recent hx of  |              |
| pneumonia      Sep 7, 2018 [facility] [place]  |              |
| Emergency    Emergency    Chief Complaint: URO MALE      2018  |              |
| [facility] Emergency    Emergency                  |              |
|     Weakness        Shortness of Breath        Chronic obstructive     |              |
| pulmonary disease with (acute) exacerbation        Other forms of      |              |
| dyspnea      2018 [facility]               |              |
| Emergency    Emergency          Shortness of Breath        Weakness    |              |
|      Lymphedema, not elsewhere classified            E.D. Visit Count  |              |
| (12 mo.)  Facility Visits Low Acuity   [facility]       |              |
| System 1 0   [facility] 4 0   Total 5 0   Note:    |              |
| Visits indicate total known visits. Medicaid Low Acuity Dx are the     |              |
| number of primary diagnoses on the Medicaid's Low Acuity dx list.      |              |
|     Recent Inpatient Visit Summary  Admit Date Facility [facility] State     |              |
| Type Major Type Diagnoses or Chief Complaint   2018 [facility]     |              |
| [facility] General Medicine    Inpatient          Other   |              |
| forms of dyspnea        Chronic obstructive pulmonary disease with     |              |
| (acute) exacerbation        Hypoxemia        Other specified soft      |              |
| tissue disorders            PDMP Report  PDMP query found no report.   |              |
|    Care Providers  Provider PRC Type Phone Fax Service Dates   SERMON, |              |
|  VINCE PHAN Physician Assistant [phone]       |              |
| Current      Kyaw Song ONOFRE  ([phone])        |              |
| [phone] Current      [facility]      |              |
| Health Provider [phone]    Current         Known Aliases  No    |              |
| known aliases.   Criteria met         Care Guidelines     The above    |              |
| information is provided for the sole purpose of patient treatment. Use |              |
|  of this information beyond the terms of Data Sharing Memorandum of    |              |
| Understanding and License Agreement is prohibited. In certain cases    |              |
| not all visits may be represented.   Consult the aforementioned        |              |
| facilities for additional information.   2018 [employer]       |              |
| [email or web address] - [place], [place] -                              |              |
| [email or web address]                                         |              |
+------------------------------------------------------------------------+--------------+
 
 
 
+-------------------------------------------------------------------------------------------
--------------------------------------------------------------------------------------------
--------------------+
| Procedure Note                                                                            
                                                                                            
                    |
+-------------------------------------------------------------------------------------------
--------------------------------------------------------------------------------------------
--------------------+
|   Interface, Lab - 2018  2:00 PM PDT  Formatting of this note may be different      
                                                                                            
                    |
| from the original.[email or web address] [id number] patient has registered at the [facility]    
                                                                                            
                    |
| [facility] Emergency Department For more information visit:                  
                                                                                            
 
                    |
| [email or web address] Security     
                                                                                            
                    |
| EventsNo recent Security Events currently on fileED Care Guidelines from [employer] -       
                                                                                            
                    |
| [place] Updated: 18 9:04 AM  Additional Information:Currently placed at         
                                                                                            
                    |
| [facility], please contact Candace Osgood      
                                                                                            
                    |
| [phone].Prescription medication goes through [facility], [phone].These are    
                                                                                            
                    |
| guidelines and the provider should exercise clinical judgment when providing care.Recent  
                                                                                            
                    |
|  Emergency Department Visit SummaryAdmit Date Facility City State Type Major Type         
                                                                                            
                    |
| Diagnoses or Chief Complaint Sep 16, 2018 [facility] Emergency        
                                                                                            
                    |
| Emergency    increased confusion, dizziness, weakness, and coughing. pt has UTI, MRSA,    
                                                                                            
                    |
| and recent hx of pneumonia  Sep 7, 2018 [facility] [place]        
                                                                                            
                    |
| Emergency  Emergency  Chief Complaint: URO MALE  2018 [facility]        
                                                                                            
                    |
| [facility] Emergency  Emergency    Weakness    Shortness of Breath    Chronic obstructive  
                                                                                            
                    |
|  pulmonary disease with (acute) exacerbation    Other forms of dyspnea  2018      
                                                                                            
                    |
| [facility] Emergency  Emergency    Shortness of Breath    Weakness    
                                                                                            
                    |
|   Lymphedema, not elsewhere classified  E.D. Visit Count (12 mo.)Facility Visits Low      
                                                                                            
                    |
| [facility] 1 0 [facility] 4 0 Total 5 0  
                                                                                            
                    |
|  Note: Visits indicate total known visits. Medicaid Low Acuity Dx are the number of       
                                                                                            
                    |
| primary diagnoses on the Medicaid's Low Acuity dx list.  Recent Inpatient Visit           
                                                                                            
                    |
| SummaryAdmit Date Facility City State Type Major Type Diagnoses or Chief Complaint Jul    
                                                                                            
                    |
| 2018 [facility] General Medicine  Inpatient    Other forms of     
                                                                                            
 
                    |
| dyspnea    Chronic obstructive pulmonary disease with (acute) exacerbation    Hypoxemia   
                                                                                            
                    |
|    Other specified soft tissue disorders  PDMP ReportPDMP query found no report.Care      
                                                                                            
                    |
| ProvidersProvider PRC Type Phone Fax Service Dates [facility] [place] Physician        
                                                                                            
                    |
| Assistant [phone] Current  [facility]    
                                                                                            
                    |
| Worker [phone] Current  [facility]         
                                                                                            
                    |
| Provider [phone]  Current  Known AliasesNo known aliases. Criteria met  Care       
                                                                                            
                    |
| GuidelinesThe above information is provided for the sole purpose of patient treatment.    
                                                                                            
                    |
| Use of this information beyond the terms of Data Sharing Memorandum of Understanding and  
                                                                                            
                    |
|  License Agreement is prohibited. In certain cases not all visits may be represented.     
                                                                                            
                    |
| Consult the aforementioned facilities for additional information. 2018 [facility]         
                                                                                            
                    |
| [email or web address] - [place], [place] - [email or web address]          
                                                                                            
                    |
|E.D. Visit Count (12 mo.)                                                                  
                                                                                            
                   |
|Facility Visits Low Acuity                                                                 
                                                                                            
                    |
|[facility] 1 0                                                       
                                                                                            
                    |
|[facility] 4 0                                                         
                                                                                            
                    |
|Total 5 0                                                                                  
                                                                                            
                    |
|Note: Visits indicate total known visits. Medicaid Low Acuity Dx are the number of primary 
diagnoses on the Medicaid's Low Acuity dx list.                                             
                    |
|                                                                                           
                                                                                            
                    |
|Recent Inpatient Visit Summary                                                             
                                                                                            
                    |
|Admit Date Facility City State Type Major Type Diagnoses or Chief Complaint                
                                                                                            
 
                    |
|2018 [facility] [place] General Medicine  Inpatient                    
                                                                                            
                    |
|  Other forms of dyspnea                                                                   
                                                                                            
                    |
|   Chronic obstructive pulmonary disease with (acute) exacerbation                         
                                                                                            
                    |
|   Hypoxemia                                                                               
                                                                                            
                    |
|   Other specified soft tissue disorders                                                   
                                                                                            
                    |
|                                                                                           
                                                                                            
                    |
|                                                                                           
                                                                                            
                    |
|                                                                                           
                                                                                            
                    |
|PDMP Report                                                                                
                                                                                            
                    |
|PDMP query found no report.                                                                
                                                                                            
                    |
|                                                                                           
                                                                                            
                    |
|Care Providers                                                                             
                                                                                            
                    |
|Provider PRC Type Phone Fax Service Dates                                                  
                                                                                            
                    |
|[facility] [place] Physician Assistant [phone] Current            
                                                                                            
                    |
|[facility]  [phone] Current         
                                                                                            
                    |
|[facility] Provider [phone]  Current                       
                                                                                            
                    |
|                                                                                           
                                                                                            
                    |
|Known Aliases                                                                              
                                                                                            
                    |
|No known aliases.                                                                          
                                                                                            
                    |
|Criteria met                                                                               
                                                                                            
 
                    |
|                                                                                           
                                                                                            
                    |
|  Care Guidelines                                                                          
                                                                                            
                    |
|                                                                                           
                                                                                            
                    |
|The above information is provided for the sole purpose of patient treatment. Use of this in
formation beyond the terms of Data Sharing Memorandum of Understanding and License Agreement
 is prohibited. In  |
|certain cases not all visits may be represented. Consult the aforementioned facilities for 
additional information.                                                                     
                    |
|2018 [employer] - [place], [place] - [email or web address]                                                                                       
                    |
+-------------------------------------------------------------------------------------------
--------------------------------------------------------------------------------------------
--------------------+
 
 
 
+-------------------+---------+--------------------+--------------+
| Performing        | Address | City/State/Zipcode | Phone Number |
| Organization      |         |                    |              |
+-------------------+---------+--------------------+--------------+
|   ED INFORMATION  |         |                    |              |
| EXCHANGE          |         |                    |              |
+-------------------+---------+--------------------+--------------+
 in this encounter
 
 Visit Diagnoses
 
 
+-------------------------------------------------------------------------------+
| Diagnosis                                                                     |
+-------------------------------------------------------------------------------+
| Fall, initial encounter - Primary                                             |
+-------------------------------------------------------------------------------+
| Closed compression fracture of first lumbar vertebra, initial encounter (HCC) |
+-------------------------------------------------------------------------------+
 
 
 
 Admitting Diagnoses
 
 
+-------------------------------------------------------------------------------+
| Diagnosis                                                                     |
+-------------------------------------------------------------------------------+
| Fall, initial encounter                                                       |
+-------------------------------------------------------------------------------+
| Closed compression fracture of first lumbar vertebra, initial encounter (HCC) |
+-------------------------------------------------------------------------------+
 
 
 
 
 Administered Medications
 
 
+-----------------------------------+--------+----------+-------+------+------+
| Medication Order                  | MAR    | Action   | Dose  | Rate | Site |
|                                   | Action | Date     |       |      |      |
+-----------------------------------+--------+----------+-------+------+------+
|   ipratropium-albuterol (DUO-NEB) | Given  |  | 3 mLs |      |      |
|  0.5-2.5 mg/3mL nebulizer         |        | 8 15:17  |       |      |      |
| solution 3 mL  3 mL,              |        | PDT      |       |      |      |
| Nebulization, Once RT, Sun        |        |          |       |      |      |
| 18 at 1432, For 1 dose       |        |          |       |      |      |
+-----------------------------------+--------+----------+-------+------+------+
 
 
 
+-----------------------------------+---+
|                                   |   |
+-----------------------------------+---+
|   sodium chloride 0.9 % flush 10  |   |
| mL  10 mL, Intravenous, Every 8   |   |
| Hours, First dose on Sun 18  |   |
| at 1432                           |   |
+-----------------------------------+---+
|                                   |   |
+-----------------------------------+---+
 in this encounter

## 2018-09-18 NOTE — XMS
Encounter Summary
  Created on: 2018
 
 [place] Jp RIVERA
 External Reference #: [id number]
 : 48
 Sex: Male
 
 Demographics
 
 
+-----------------------+----------------------+
| Address               | [address]          |
|                       | [place]  [address] |
+-----------------------+----------------------+
| Home Phone            | [phone]      |
+-----------------------+----------------------+
| Preferred Language    | Unknown              |
+-----------------------+----------------------+
| Marital Status        |              |
+-----------------------+----------------------+
| Voodoo Affiliation | [id number]                 |
+-----------------------+----------------------+
| Race                  | Unknown              |
+-----------------------+----------------------+
| Ethnic Group          | Unknown              |
+-----------------------+----------------------+
 
 
 Author
 
 
+--------------+-----------------------+
| Author       | [facility] |
+--------------+-----------------------+
| Organization | [facility] |
+--------------+-----------------------+
| Address      | Unknown               |
+--------------+-----------------------+
| Phone        | Unavailable           |
+--------------+-----------------------+
 
 
 
 Support
 
 
+---------------+--------------+---------+-----------------+
| Name          | Relationship | Address | Phone           |
+---------------+--------------+---------+-----------------+
| Delilah Hall | ECON         | Unknown | [phone] |
+---------------+--------------+---------+-----------------+
| Tesha Veloz   | ECON         | Unknown | [phone] |
+---------------+--------------+---------+-----------------+
 
 
 
 Care Team Providers
 
 
 
+------------------------+------+-----------------+
| Care Team Member Name  | Role | Phone           |
+------------------------+------+-----------------+
| Bj Samuel MD | PCP  | [phone] |
+------------------------+------+-----------------+
 
 
 
 Reason for Visit
 
 
+--------+-----------+
| Reason | Comments  |
+--------+-----------+
| Other  | Emphysema |
+--------+-----------+
 Consult and Treat (Urgent)
 
+----------+--------+-------------+--------------+--------------+---------------+
| Status   | Reason | Specialty   | Diagnoses /  | Referred By  | Referred To   |
|          |        |             | Procedures   | Contact      | Contact       |
+----------+--------+-------------+--------------+--------------+---------------+
| Pending  |        | Pulmonology |   Diagnoses  |   Sermon,    |   Ben,   |
| Review   |        |             |  Emphysema,  | Kirby FITZPATRICK,   | Edwina     |
|          |        |             | unspecified  | [place]  [address]      | MD [address]   |
|          |        |             | ([facility]ly [facility])        | [place]       | [address] |
|          |        |             | Hypoxemia    | [place]  |             |
|          |        |             | Pulmonary    | [address]        | [place], [place]  |
|          |        |             | nodules      | Phone:       | [address]  Phone: |
|          |        |             |              | [phone] |  [phone] |
|          |        |             |              |   Fax:       |   Fax:        |
|          |        |             |              | [phone] | [phone]  |
+----------+--------+-------------+--------------+--------------+---------------+
 
 
 
 
 Encounter Details
 
 
+--------+---------+---------------------+----------------------+----------------------+
| Date   | Type    | Department          | Care Team            | Description          |
+--------+---------+---------------------+----------------------+----------------------+
| / | Office  |   [facility]     |   Ben,          | Asthma with COPD     |
| 2018   | Visit   | Pulmonology  [address]   | Edwina Mckeon,   | (chronic obstructive |
|        |         | Nelli GIBSON   | MD  [address] |  pulmonary disease)  |
|        |         | [place], [place]        |   [place] [address] | (Formerly [facility]) (Primary Dx);  |
|        |         | [address]          |   [phone]       | Nocturnal hypoxemia; |
|        |         | [phone]        | [phone] (Fax)   |  Multiple pulmonary  |
|        |         |                     |                      | nodules; EMELINA         |
|        |         |                     |                      | (obstructive sleep   |
|        |         |                     |                      | apnea); Personal     |
|        |         |                     |                      | history of tobacco   |
|        |         |                     |                      | use, presenting      |
|        |         |                     |                      | hazards to health    |
+--------+---------+---------------------+----------------------+----------------------+
 
 
 
 
 Social History
 
 
+---------------+-------+-----------+--------+------------------+
| Tobacco Use   | Types | Packs/Day | Years  | Date             |
|               |       |           | Used   |                  |
+---------------+-------+-----------+--------+------------------+
| Former Smoker |       | 3         | 20     | Quit: 1998 |
+---------------+-------+-----------+--------+------------------+
 
 
 
+---------------------+---+---+---+
| Smokeless Tobacco:  |   |   |   |
| Never Used          |   |   |   |
+---------------------+---+---+---+
 
 
 
+-------------+-----------+---------+----------+
| Alcohol Use | Drinks/We | oz/Week | Comments |
|             | ek        |         |          |
+-------------+-----------+---------+----------+
| No          |           |         |          |
+-------------+-----------+---------+----------+
 
 
 
+------------------+---------------+
| Sex Assigned at  | Date Recorded |
| Birth            |               |
+------------------+---------------+
| Not on file      |               |
+------------------+---------------+
 as of this encounter
 
 Last Filed Vital Signs
 
 
+-------------------+---------------------+-------------------------+
| Vital Sign        | Reading             | Time Taken              |
+-------------------+---------------------+-------------------------+
| Blood Pressure    | 91/60               | 2018 10:47 AM PDT |
+-------------------+---------------------+-------------------------+
| Pulse             | 75                  | 2018 10:47 AM PDT |
+-------------------+---------------------+-------------------------+
| Temperature       | 36.8   C (98.3   F) | 2018 10:47 AM PDT |
+-------------------+---------------------+-------------------------+
| Respiratory Rate  | -                   | -                       |
+-------------------+---------------------+-------------------------+
| Oxygen Saturation | 92%                 | 2018 10:47 AM PDT |
+-------------------+---------------------+-------------------------+
| Inhaled Oxygen    | -                   | -                       |
| Concentration     |                     |                         |
+-------------------+---------------------+-------------------------+
| Weight            | -                   | -                       |
+-------------------+---------------------+-------------------------+
| Height            | 175.3 cm (5' 9")    | 2018 10:47 AM PDT |
+-------------------+---------------------+-------------------------+
 
| Body Mass Index   | -                   | -                       |
+-------------------+---------------------+-------------------------+
 in this encounter
 
 Instructions
 Patient Instructions - Edwina Contreras MD - 2018 10:45 AM PDTPLS ASK YOU
R PCP TO REFER YOU TO SLEEP MEDICINE FOR EVALUATION OF OBSTRUCTIVE SLEEP APNEAin this encoun
ter
 
 Progress Notes
 Edwina Contreras MD - 2018 10:45 AM PDTFormatting of this note may be dif
ferent from the original.
   
 Subjective: 
 
 Patient ID: Jp Addison is a 68 y.o. male with COPD/emphysema, chronic resp failur
e with hypoxemia, Schizoaffective d/o, HTN, 
 
 HPI 
 
 Mr Addison is a pleasant 68 yr old man who had asthma as a child (was on inhaled treatme
nts), and who has been diagnosed about 7 years ago with COPD, and has been on chronic oxygen
 therapy for at least 2 years ago. He was admitted to [facility] in  for massive GI bleeding f
rom a duodenal mass. He underwent IR embolization and ex lap for this. He was found to have 
a bleeding ulcer, but no evidence of malignancy. He was recently admitted in [facility] in April
 for an acute worsening of his dyspnea and was treated as a COPD exacerbation. He has been m
aintained on Incruse Ellipta daily and Pulmicort nebulization BID. He is on oxygen at night 
at 2LPM. He also is on daily furosemide due to pedal edema. 
 
 Clinically, he is short of breath with minimal exertion. He has difficulty walking on his f
eet without assistance. He is awaiting a walker that was prescribed by his PCP. He has a chr
onic cough productive of yellow to white phlegm. He denies hemoptysis. He is suspected of ha
ving EMELINA but has not undergone evaluation for this. He denies chest pains, but has pedal saqib
ma. He has no PNDs and no orthopnea. He lays usually on his side to sleep. He snores at [facility]. He denies dysphagia or aspiration episodes. He also denies heart burn or reflux s/s. He h
as no seasonal allergies.
 
 He comes in with a home health care provider and his sister. 
 
 SOCIAL HISTORY
 He is a past smoker, stopped 20 years ago, 2-3 packs a day for 30 years. 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 He denies any occupational exposures. He is origin
ally from [place] and moved to [place] in . He has no animals at home, but used to have cats. He d
enies owning birds. 
 
 The following portions of the patient's history were reviewed and updated as appropriate an
d is available elsewhere in the record: allergies, current medications, past family history,
 past medical history, past social history, past surgical history and problem list.
 
 Review of Systems 
 Constitutional: Positive for fatigue. Negative for activity change, appetite change, chills
, diaphoresis, fever and unexpected weight change. 
 HENT: Negative for congestion, nosebleeds, postnasal drip, rhinorrhea and sore throat.  
 Eyes: Negative for visual disturbance. 
 Respiratory: Positive for apnea (suspected), cough, shortness of breath and wheezing. Negat
jp for choking, chest tightness and stridor.  
 Cardiovascular: Positive for leg swelling. Negative for chest pain and palpitations. 
 Gastrointestinal: Negative for constipation, diarrhea and nausea. 
 Genitourinary: Negative for dysuria and frequency. 
 Musculoskeletal: Positive for arthralgias and gait problem. Negative for joint swelling, my
 
algias and neck pain. 
 Skin: Negative for rash. 
 Neurological: Negative for dizziness, weakness and light-headedness. 
 Hematological: Negative for adenopathy. 
 Psychiatric/Behavioral: Negative for sleep disturbance. 
 
 Active Ambulatory Problems 
   Diagnosis Date Noted 
   Hypoxemia 2018 
   COPD (chronic obstructive pulmonary disease) ([facility]) 2018 
   Schizoaffective disorder ([facility]) 2018 
   Essential hypertension 2018 
   Memory deficits 2018 
 
 Resolved Ambulatory Problems 
   Diagnosis Date Noted 
   Weakness 2018 
 
 Past Medical History: 
 Diagnosis Date 
   Back injury  
   Bleeding ulcer  
   COPD (chronic obstructive pulmonary disease) (Former[facility])  
   Head injury  
   Other chronic pain  
   Schizoaffective disorder ([facility])  
 
 History reviewed. No pertinent surgical history.
 
     
 Objective: 
 Physical Exam
 
 BP 91/60 (BP Location: Left upper arm, Patient Position: Sitting)  | Pulse 75  | Temp 98.3 
F (36.8 C) (Oral)  | Ht 1.753 m (5' 9") Comment: per pt | SpO2 92% 
 
 Vital signs reviewed.
 Oxygen saturation noted at 92% on ambient air
 GENERAL: obese, pale, pleasant, cooperative, oriented, not in distress
 HEENT: pink conjunctiva, anicteric sclerae, moist oral mucosae and without any lesions, nor
mal appearing nasal mucosae; JVP cannot be assessed; MALAMPATTI 4; no thyromegaly; no cervic
olymphadenopathies
 CVS: PMI laterally displaced, NRRR, S1 and S2, no murmurs/gallops/rubs
 CHEST: Examination of the chest showed a mild kyphosis.
 LUNGS: Normal effort, Equal in expansion, resonant to percussion, diffuse wheezing present,
 especially in bases, no crackles
 ABDOMEN: Protuberant abdomen, NABS, non-tender on palpation, liver span normal, no masses p
alpated
 EXTREMITIES: good distal pulses, no cyanosis, no clubbing, no nail abnormalities, with +2 p
edal edema
 NEURO: awake and oriented, no focal neurologic deficits, flat affect, on a wheelchair today
 
 LABORATORY AND IMAGING
 
 Pulmonary Function Test:
   18
 FEV1  1.45 (45%)
 FVC  2.19 (52%)
 FEV1/FVC 66
 TLC 
 
 RV/TLC
 DLCO
 
 CTA of the chest done on 18 reviewed
 Pulmonary Arteries: 
 Diagnostic quality: Nearly nondiagnostic secondary to poor contrast opacification of the pu
lmonary arteries. No gross evidence of large central pulmonary embolism.
 
 RV/LV is within normal limits. There is no interventricular septal bowing. There is trace r
eflux of contrast material in the IVC.
 
 Lungs/Pleura: No consolidation. Scattered calcified granulomas measuring up to 7 mm. No ple
ural effusion or pneumothorax. Mild bilateral centrilobular emphysema.
 
 Mediastinum: The heart size is normal. No pericardial effusion. No adenopathy. 
 
 Thoracic Aorta: Ectatic ascending thoracic aorta measures up to 4.0 cm in diameter.
 
 Upper Abdomen: Unremarkable.
 
 Other: Focal kyphosis in the upper thoracic spine secondary to severe compression deformity
 at T3 which appears chronic. No acute osseous abnormality.
 
 Echo done on 18 reviewed
 Impression 
 1. This was a technically difficult study with suboptimal views. 
 2. Overall left ventricular systolic function is normal with, an EF between 60 - 65 %. 
 3. The right ventricle is moderately enlarged measuring between 3.8 - 4.1 cm. 
 
    
 Assessment and Plan: 
 
 1. Asthma with COPD (chronic obstructive pulmonary disease) (HCC)
 Mr Addison is a 68 yr old man who I suspect has asthma-COPD overlap. He has declined in 
the past years, and especially from his last hospitalization. I have recommended changing In
jackie to Anoro, which will provide him with a LABA and a LAMA. Continue Budesonide nebulizat
ion BID. 
 
 I have educated the patient about the nature of COPD. This is a progressive disease, and marian
ng function, unfortunately, declines every year, despite present therapy and even oxygen use
. Our main goal is to prevent further exacerbation, and to moderate the manifestation of david
g function decline by ensuring conditioning of muscles that are responsible for the extra ef
fort of breathing, as well as to improve patient's endurance. These may all be accomplished 
by a formal exercise regimen, and/or by participating in a formal Pulmonary Rehabilitation P
svetlana. This program has been shown to improve functional status, exercise capacity, percept
ion of dyspnea, and even has a role in improving depression in these patients. He and his si
ster agreed, and so have sent a Pulm Rehab request through [facility]. He reportedly had 
a recent PFT done over there and we are awaiting its result. 
 
 - umeclidinium-vilanterol (ANORO ELLIPTA) 62.5-25 MCG/INH inhaler; Inhale 1 puff into the l
ungs daily.  Dispense: 1 each; Refill: 11
 
 2. Nocturnal hypoxemia
 Continue with O2 supplementation at night at 2LPM. His care giver was instructed to let us 
know if his saturations are falling to 88% and below during the day. He may need to be place
d on daytime O2 at that point. 
 
 3. Multiple pulmonary nodules
 He has small pulm nodules that are likely post inflammatory. Given his past history of smok
ing, he will need to have a repeat CT in 2019 as part of continued surveillance. 
 
 
 4. EMELINA (obstructive sleep apnea)
 I suspect that he has untreated EMELINA and I have encouraged him to talk to his PCP regarding 
this and potentially sending him to a sleep doctor close to his home. 
 
 5. Personal history of tobacco use, presenting hazards to health
 Continue being vigilant about signs of malignancy. CT chest should be repeated in .
 
 Thank you for allowing us to participate in this patient's care. A return visit has been re
quested/scheduled in three months for close clinical follow up. The patient was instructed t
o call our clinic for any questions, and for any concerns regarding worsening dyspnea, cough
 or change in sputum production. We will see the patient sooner than the recommended follow 
up date,  if with any worsening of symptoms. 
 
 Edwina Contreras MD
 Pulmonary and Critical Care Medicine
 [facility]
 [address]
 [place] [address]
 . 
  
 
  
 
 in this encounter
 
 Plan of Treatment
 
 
+--------+---------+-------------+----------------------+-------------+
| Date   | Type    | Specialty   | Care Team            | Description |
+--------+---------+-------------+----------------------+-------------+
| 10/19/ | Office  | Pulmonology |   Ben,          |             |
|    | Visit   |             | Edwina Mckeon,   |             |
|        |         |             | MD [address] |             |
|        |         |             |   [place] [address] |             |
|        |         |             |   [phone]       |             |
|        |         |             | [phone] (Fax)   |             |
+--------+---------+-------------+----------------------+-------------+
 as of this encounter
 
 Visit Diagnoses
 
 
+--------------------------------------------------------------------------+
| Diagnosis                                                                |
+--------------------------------------------------------------------------+
| Asthma with COPD (chronic obstructive pulmonary disease) (HCC) - Primary |
+--------------------------------------------------------------------------+
|   Chronic obstructive asthma, unspecified                                |
+--------------------------------------------------------------------------+
| Nocturnal hypoxemia                                                      |
+--------------------------------------------------------------------------+
|   Hypoxemia                                                              |
+--------------------------------------------------------------------------+
| Multiple pulmonary nodules                                               |
+--------------------------------------------------------------------------+
|   Other nonspecific abnormal finding of lung field                       |
+--------------------------------------------------------------------------+
 
| EMELINA (obstructive sleep apnea)                                            |
+--------------------------------------------------------------------------+
|   Obstructive sleep apnea (adult) (pediatric)                            |
+--------------------------------------------------------------------------+
| Personal history of tobacco use, presenting hazards to health            |
+--------------------------------------------------------------------------+

## 2018-09-18 NOTE — XMS
Encounter Summary
  Created on: 2018
 
 Vince Jp RIVERA
 External Reference #: [id number]
 : 48
 Sex: Male
 
 Demographics
 
 
+-----------------------+----------------------+
| Address               | [address]          |
|                       | [place]  [address] |
+-----------------------+----------------------+
| Home Phone            | [phone]      |
+-----------------------+----------------------+
| Preferred Language    | Unknown              |
+-----------------------+----------------------+
| Marital Status        |              |
+-----------------------+----------------------+
| Mor[employer] Affiliation | [id number]                 |
+-----------------------+----------------------+
| Race                  | Unknown              |
+-----------------------+----------------------+
| Ethnic Group          | Unknown              |
+-----------------------+----------------------+
 
 
 Author
 
 
+--------------+-----------------------+
| Author       | [facility] |
+--------------+-----------------------+
| Organization | [facility] |
+--------------+-----------------------+
| Address      | Unknown               |
+--------------+-----------------------+
| Phone        | Unavailable           |
+--------------+-----------------------+
 
 
 
 Support
 
 
+---------------+--------------+---------+-----------------+
| Name          | Relationship | Address | Phone           |
+---------------+--------------+---------+-----------------+
| Delilah Hall | ECON         | Unknown | [phone] |
+---------------+--------------+---------+-----------------+
| Tesha Veloz   | ECON         | Unknown | [phone] |
+---------------+--------------+---------+-----------------+
 
 
 
 Care Team Providers
 
 
 
+-----------------------+------+-----------------+
| Care Team Member Name | Role | Phone           |
+-----------------------+------+-----------------+
| Kirby Guo  | PCP  | [phone] |
+-----------------------+------+-----------------+
 
 
 
 Reason for Visit
 
 
+---------------------+----------+
| Reason              | Comments |
+---------------------+----------+
| Shortness of Breath |          |
+---------------------+----------+
| Weakness            |          |
+---------------------+----------+
 Auth/Cert
 
+--------+--------+-----------+--------------+--------------+---------------+
| Status | Reason | Specialty | Diagnoses /  | Referred By  | Referred To   |
|        |        |           | Procedures   | Contact      | Contact       |
+--------+--------+-----------+--------------+--------------+---------------+
|        |        | Internal  |   Diagnoses  |              |   [facility]    |
|        |        | Medicine  |  Dyspnea on  |              | [address]   |
|        |        |           | exertion     |              | [address]  [address] |
|        |        |           | COPD with    |              |  [address]   |
|        |        |           | acute        |              | [place], [place]  |
|        |        |           | exacerbation |              | [address]  Phone: |
|        |        |           |  ([facility])       |              |  [phone] |
+--------+--------+-----------+--------------+--------------+---------------+
 
 
 
 
 Encounter Details
 
 
+--------+-----------+----------------------+----------------------+----------------------+
| Date   | Type      | Department           | Care Team            | Description          |
+--------+-----------+----------------------+----------------------+----------------------+
| / | Hospital  |   [facility]    |   Pedro Luis Herrera  | COPD with acute      |
| 2018 - | Encounter | [facility]   | D, DO  [address]     | exacerbation (HCC)   |
|        |           | [address] [facility] | Blvd  [place], [place]   | (Primary Dx);        |
| / |           |   [address]     | [address]  [phone]  | Dyspnea on exertion; |
|    |           | [place] [address]   |  Eliane De Oliveira DO    |  Hypoxemia; Leg      |
|        |           | [phone]         | [address]       | swelling             |
|        |           |                      | [place] [address]   |                      |
|        |           |                      | [phone]         |                      |
|        |           |                      | [phone] (Fax)   |                      |
|        |           |                      | Onur Gates MD  [address]   |                      |
|        |           |                      | [address]           |                      |
|        |           |                      | [place] [address]   |                      |
|        |           |                      | [phone]         |                      |
|        |           |                      | [phone] (Fax)   |                      |
+--------+-----------+----------------------+----------------------+----------------------+
 
 
 
 
 Social History
 
 
+---------------+-------+-----------+--------+------------------+
| Tobacco Use   | Types | Packs/Day | Years  | Date             |
|               |       |           | Used   |                  |
+---------------+-------+-----------+--------+------------------+
| Former Smoker |       | 3         | 20     | Quit: 1998 |
+---------------+-------+-----------+--------+------------------+
 
 
 
+---------------------+---+---+---+
| Smokeless Tobacco:  |   |   |   |
| Never Used          |   |   |   |
+---------------------+---+---+---+
 
 
 
+-------------+-----------+---------+----------+
| Alcohol Use | Drinks/We | oz/Week | Comments |
|             | ek        |         |          |
+-------------+-----------+---------+----------+
| No          |           |         |          |
+-------------+-----------+---------+----------+
 
 
 
+------------------+---------------+
| Sex Assigned at  | Date Recorded |
| Birth            |               |
+------------------+---------------+
| Not on file      |               |
+------------------+---------------+
 as of this encounter
 
 Last Filed Vital Signs
 
 
+-------------------+----------------------+-------------------------+
| Vital Sign        | Reading              | Time Taken              |
+-------------------+----------------------+-------------------------+
| Blood Pressure    | 111/68               | 2018 12:30 PM PDT |
+-------------------+----------------------+-------------------------+
| Pulse             | 71                   | 2018 12:30 PM PDT |
+-------------------+----------------------+-------------------------+
| Temperature       | 37.1   C (98.8   F)  | 2018 12:30 PM PDT |
+-------------------+----------------------+-------------------------+
| Respiratory Rate  | 20                   | 2018 12:30 PM PDT |
+-------------------+----------------------+-------------------------+
| Oxygen Saturation | 91%                  | 2018 12:30 PM PDT |
+-------------------+----------------------+-------------------------+
| Inhaled Oxygen    | -                    | -                       |
| Concentration     |                      |                         |
+-------------------+----------------------+-------------------------+
| Weight            | 99.7 kg (219 lb 12.8 | 2018  2:58 AM PDT |
|                   |  oz)                 |                         |
+-------------------+----------------------+-------------------------+
 
| Height            | 175.3 cm (5' 9")     | 2018  8:00 PM PDT |
+-------------------+----------------------+-------------------------+
| Body Mass Index   | 32.46                | 2018  2:58 AM PDT |
+-------------------+----------------------+-------------------------+
 in this encounter
 
 Discharge Summaries
 Onur Gates MD - 2018 12:47 PM PDTFormatting of this note may be different from the or
[facility].
 [facility]  
 Service:  Hospitalist
 Discharge Summary
 
 Date of Admission:  2018
 Date of Discharge:  2018
 
 Discharge Provider:  Onur Gates MD
 Treatment Team: 
 Admitting Provider: Eliane De Oliveira DO
 
 Discharge Diagnoses:  
 
 Principal Problem:
   Hypoxemia
 Active Problems:
   BMI 31.0-31.9,adult
   Schizoaffective disorder (HCC)
   Leg swelling
   COPD (chronic obstructive pulmonary disease) (HCC)
 Resolved Problems:
   * No resolved hospital problems. *
 
 Final Diagnoses:   Acute on chronic hypoxic respiratory failure
    CAP
    EMELINA, nocturnal oxygen use but refuses CPAP
    COPD
    Schizoaffective disorder
    Persistent leukocytosis due to prednisone
 
 Procedures:  * No surgery found *
 
 Significant Diagnostic Studies:  
 Xr Chest Pa And Lateral
 
 Result Date: 2018
 Bilateral pulmonary vascular congestion. Bibasilar atelectasis. Electronically signed by Is
franck lBandon MD on 2018 7:10 PM
 
 Cta Chest Pulmonary Embolism W Iv Con
 
 Result Date: 2018
 1. Some images are degraded due to motion artifact. No obvious pulmonary emboli. 2. Emphyse
ma with pulmonary vascular congestion and bilateral infiltrate or atelectasis, right greater
 than left. 3. Dilated ascending aorta measuring 4.4 cm. No aortic dissection. 4. Marked tra
cheomalacia. RADIA  Electronically signed by Alex Williamson MD on 2018 12:56AM Referrin
krishan Provider Line: [phone] ID: [id number]
 
 Us Lower Extremity Venous Doppler Bilateral
 
 Result Date: 2018
 
 1.  No evidence of lower extremity deep vein thrombosis. Electronically signed by Mega barros MD on 2018 9:48 PM
 
 BRIEF HISTORY OF PRESENTATION:  
      Jp Clarke is a 68 y.o. male with PMH significant for pHTN, COPD, EMELINA, schiz
oaffective disorder admitted for SOB.  Pt is a resident of a group home in [place]. Pt use
s nocturnal o2 but refuses CPAP.  Pt sees Dr. Contreras.  Pt wishes to be DNR/DNI.  Please re
parish to H&P for full details.
 HOSPITAL COURSE:  
      Pt has been having SOB, COTA, orthopnea, edema in legs, productive cough, wheezing.  Pt
 had CTA chest that was negative for PE but showed emphysema with pulmonary vascular congest
ion and bilateral infiltrate or atelectasis and marked tracheomalacia.  RVP was negative, sp
utum culture was not a good specimen.  procalcitonin was 0.93.  It was felt that his symptom
s were due to bilateral PNA with diastolic CHF exacerbation with underlying pulm HTN.  Pt wa
s started on IV levaquin and was diuresed.  Pt also started on prednisone for concern for CO
PD exacerbation but this is felt less likely will taper off the prednisone quickly.  Pt stil
l with coarse breath sounds on day of discharge but otherwise hypoxia resolved back to basel
ine and pt clinically improved.  Pt was initially requiring 4L during the day.  On day of di
marvinrkarla, pt on RA during the day and 2L at night.  Pt can return to adult family home.
 Past Medical History 
 Diagnosis Date 
   Asthma with COPD (chronic obstructive pulmonary disease) ([facility]) 2018 
   Back injury  
   Bleeding ulcer  
   COPD (chronic obstructive pulmonary disease) ([facility])  
   Head injury  
   EMELINA (obstructive sleep apnea) 2018 
   Other chronic pain  
   Schizoaffective disorder ([facility])  
 
 History reviewed. No pertinent surgical history.
 
 Allergies 
 Allergen Reactions 
   Peanut Oil Shortness of Breath and Hives 
 
 Prescriptions Prior to Admission 
 Medication Sig Dispense Refill Last Dose 
   Acetaminophen 500 MG coapsule Take 1,000 mg by mouth 4 (four) times daily.   2018@
1200 
   albuterol (PROVENTIL HFA;VENTOLIN HFA) 108 (90 Base) MCG/ACT inhaler Inhale 2 puffs int
o the lungs every 4 (four) hours as needed for Wheezing.   2018 
   budesonide (PULMICORT) 0.5 MG/2ML nebulizer suspension Take 0.5 mg by nebulization 2 (t
wo) times daily.   2018@0800 
   buPROPion (WELLBUTRIN XL) 300 MG 24 hr tablet Take 300 mg by mouth every morning.   @0800 
   dextromethorphan polistirex (DELSYM) 30 MG/5ML ER suspension Take 60 mg by mouth every 
12 (twelve) hours as needed for Cough.   2018@PM 
   ergocalciferol (DRISDOL) 76803 units capsule Take 50,000 Units by mouth once a week.   
2018 
   furosemide (LASIX) 20 MG tablet Take 20 mg by mouth daily.   2018@AM 
   guaifenesin (MUCINEX MAX) 1200 MG 12hr tablet Take 1,200 mg by mouth every 6 (six) hour
s as needed.   2018@PM 
   L-methylfolate (DEPLIN) 15 MG tablet Take 15 mg by mouth daily with breakfast.   
018@0800 
   lurasidone (LATUDA) 40 MG tablet Take 40 mg by mouth daily.   2018@1700 
   Methylcobalamin (METHYL B-12 PO) Take 5,000 mcg by mouth 2 (two) times daily.   20
18@0800 
   OLANZapine (ZYPREXA) 20 MG tablet Take 10 mg by mouth 2 (two) times daily.   2018@
0800 
 
   omeprazole (PRILOSEC) 20 MG capsule Take 20 mg by mouth 2 (two) times daily.   
8@AM 
   oxycodone (OXY-IR) 5 MG capsule Take 1 capsule by mouth every 6 (six) hours as needed. 
30 capsule 0 2 weeks ago 
   polyethylene glycol (GLYCOLAX) packet Take 17 g by mouth daily.   2018@0800 
   propranolol (INDERAL) 20 MG tablet Take 20 mg by mouth 2 (two) times daily.   2018
@0800 
   sertraline (ZOLOFT) 100 MG tablet Take  mg by mouth See Admin Instructions. Take 
100 mg by mouth every morning and 50 mg by mouth in the afternoon   2018@0800 
   simvastatin (ZOCOR) 40 MG tablet Take 40 mg by mouth nightly.   2018@PM 
   topiramate (TOPAMAX) 25 MG tablet Take 25 mg by mouth 2 (two) times daily.   2018@
0700 
   umeclidinium-vilanterol (ANORO ELLIPTA) 62.5-25 MCG/INH inhaler Inhale 1 puff into the 
lungs daily. 1 each 11 2018@0800 
 
 DISCHARGE EXAM
 Vital Signs:
 /68 (BP Location: Left upper arm)  | Pulse 71  | Temp 98.8 F (37.1 C) (Oral)  | R
tara 20  | Ht 1.753 m (5' 9")  | Wt 99.7 kg (219 lb 12.8 oz)  | SpO2 91%  | BMI 32.46 kg/m 
 Temp:  [97.7 F (36.5 C)-98.8 F (37.1 C)] 98.8 F (37.1 C) ( 1230)
 BP: (101-122)/(64-75) 111/68 ( 1230)
 Heart Rate:  [68-87] 71 ( 1230)
 Resp:  [16-20] 20 ( 1230)
 SpO2:  [91 %-95 %] 91 % ( 1230)
 Weight:  [99.7 kg (219 lb 12.8 oz)] 99.7 kg (219 lb 12.8 oz) ( 0258)
 
 Physical Exam 
 Constitutional: He is oriented to person, place, and time. He appears well-developed and we
ll-nourished. No distress. 
 HENT: 
 Head: Normocephalic and atraumatic. 
 Eyes: Pupils are equal, round, and reactive to light. EOM are normal. 
 Cardiovascular: Normal rate, regular rhythm and normal heart sounds.  
 No murmur heard.
 Pulmonary/Chest: No respiratory distress. He has no wheezes. 
 Coarse breath sounds at bases but with adequate air exchange 
 Abdomina/Gl: Soft. Bowel sounds are normal. He exhibits no distension. There is no tenderne
ss. 
 Musculoskeletal: Normal range of motion. He exhibits no edema. 
 Neurological: He is alert and oriented to person, place, and time. 
 Skin: Skin is warm and dry. 
 
 DATA
 
 CBC:  
 Lab Results 
 Component Value Date 
  WBC 11.28 (H) 2018 
  RBC 4.04 (L) 2018 
  HGB 13.1 (L) 2018 
  HCT 38.8 (L) 2018 
  MCV 96.1 2018 
  MCH 32.3 2018 
  MCHC 33.6 2018 
  RDW 48.6 2018 
   2018 
  MPV 9.9 2018 
  DIFFTYPE AUTOMATED 2018 
 
 CMP:  
 
 Lab Results 
 Component Value Date 
   2018 
  K 3.4 (L) 2018 
   2018 
  CO2 26 2018 
  ANIONGAP 14 2018 
  GLUF 106 (H) 2018 
  BUN 17 2018 
  CREATININE 0.8 2018 
  BCR 21 2018 
  CA 8.8 2018 
  PROT 6.8 2018 
  ALB 2.8 (L) 2018 
  GLOB 4.0 2018 
  BILITOT 0.2 2018 
  ALP 76 2018 
  AST 20 2018 
  ALT 26 2018 
  EGFR >60 2018 
 
 Magnesium:  
 Lab Results 
 Component Value Date 
  MG 2.3 2018 
 
 Phosphorus:  
 Lab Results 
 Component Value Date 
  PHOS 2.1 (L) 2018 
 
 Last 3 Troponin:  
 Lab Results 
 Component Value Date 
  TROPONINI <0.020 2018 
 
 Results  
  Procedure Component Value Units Date/Time 
  Sputum culture [[id number]] Collected:  18 0800 
  Specimen:  Sputum from Sputum Updated:  18 1513 
   Specimen Description SPUTUM 
   GRAM STAIN GREATER THAN 10 SEC/LPF 
   GRAM STAIN LESS THAN 10 WBCS/LPF 
   GRAM STAIN 3+ 
   GRAM STAIN GRAM POSITIVE COCCI 
   CULTURE SMEAR CONTAINS GREATER THAN 10 SEC/LPF SUGGESTIVE OF POOR QUALITY SPECIMEN.  SPEC
IMEN WILL NOT BE CULTURED OR WILL BE CULTURED BY SPECIAL REQUEST ONLY.  PLEASE RECOLLECT IF 
CLINICALLY INDICATED.  SPECIMEN WILL BE HELD 48 HOURS. 
  Respiratory Filmarray [[id number]] Collected:  18 2234 
  Specimen:  Nasopharyngeal Culture Updated:  18 0337 
   ADENOVIRUS Not Detected 
   CORONAVIRUS 229E Not Detected 
   CORONAVIRUS HKU1 Not Detected 
   CORONAVIRUS NL63 Not Detected 
   CORONAVIRUS OC43 Not Detected 
   HUMAN METAPNEUMOVIRUS Not Detected 
   HUMAN RHINO/ENTERO Not Detected 
   INFLUENZA A Not Detected 
 
   INFLUENZA B Not Detected 
   PARAINFLUENZA 1 Not Detected 
   PARAINFLUENZA 2 Not Detected 
   PARAINFLUENZA 3 Not Detected 
   PARAINFLUENZA 4 Not Detected 
   RESP SYNCYTIAL VIRUS Not Detected 
   BORDETELLA PERTUSSIS Not Detected 
   CHLAMYDIAE PNEUMONIAE Not Detected 
   MYCOPLASMA PNEUMONIAE Not Detected 
   RESP PANEL INTERP Testing performed by Molecular Methodology 
  
 
 PLAN
 
 1. Return to [facility]
 2. Complete antibiotics
 
 Disposition:  [facility]
 Condition:  Improved/good
 Code Status:  DNR/DNI
 
 No discharge procedures on file.
 
 Follow up:
 Per Pt None
 
  
 Medication List 
  
 START taking these medications  
 ipratropium-albuterol 0.5-2.5 mg/3mL
 QTY:  360 mL
 Refills:  0
 Commonly known as:  DUO-NEB
 Every 4 hours x 5 days then Q4H prn for SOB or wheezing
  
 levofloxacin 750 MG tablet
 QTY:  5 tablet
 Refills:  0
 Commonly known as:  LEVAQUIN
 Take 1 tablet by mouth daily.
  
 predniSONE 20 MG tablet
 QTY:  6 tablet
 Refills:  0
 Commonly known as:  DELTASONE
 Take 2 tabs x 3 days then stop
  
  
 CONTINUE taking these medications  
 Acetaminophen 500 MG coapsule
 Refills:  0
  
 albuterol 108 (90 Base) MCG/ACT inhaler
 Refills:  0
 Commonly known as:  PROVENTIL HFA;VENTOLIN HFA
  
 budesonide 0.5 MG/2ML nebulizer suspension
 Refills:  0
 Commonly known as:  PULMICORT
 
  
 buPROPion 300 MG 24 hr tablet
 Refills:  0
 Commonly known as:  WELLBUTRIN XL
  
 dextromethorphan polistirex 30 MG/5ML ER suspension
 Refills:  0
 Commonly known as:  DELSYM
  
 ergocalciferol 23450 units capsule
 Refills:  0
 Commonly known as:  DRISDOL
  
 furosemide 20 MG tablet
 Refills:  0
 Commonly known as:  LASIX
  
 guaifenesin 1200 MG 12hr tablet
 Refills:  0
 Commonly known as:  MUCINEX MAX
  
 L-methylfolate 15 MG tablet
 Refills:  0
 Commonly known as:  DEPLIN
  
 LATUDA 40 MG tablet
 Refills:  0
 Generic drug:  lurasidone
  
 METHYL B-12 PO
 Refills:  0
  
 OLANZapine 20 MG tablet
 Refills:  0
 Commonly known as:  ZyPREXA
  
 omeprazole 20 MG capsule
 Refills:  0
 Commonly known as:  PRILOSEC
  
 oxycodone 5 MG capsule
 QTY:  30 capsule
 Refills:  0
 Commonly known as:  OXY-IR
 Take 1 capsule by mouth every 6 (six) hours as needed.
  
 polyethylene glycol packet
 Refills:  0
 Commonly known as:  GLYCOLAX
  
 propranolol 20 MG tablet
 Refills:  0
 Commonly known as:  INDERAL
  
 sertraline 100 MG tablet
 Refills:  0
 Commonly known as:  ZOLOFT
  
 simvastatin 40 MG tablet
 Refills:  0
 
 Commonly known as:  ZOCOR
  
 topiramate 25 MG tablet
 Refills:  0
 Commonly known as:  TOPAMAX
  
 umeclidinium-vilanterol 62.5-25 MCG/INH inhaler
 QTY:  1 each
 Refills:  11
 For diagnoses:  Asthma with COPD (chronic obstructive pulmonary disease)
 Commonly known as:  ANORO ELLIPTA
 Inhale 1 puff into the lungs daily.
  
  
 You might also be taking other medications not listed above. If you have questions about an
y of your other medications, talk to the person who prescribed them or your Primary Care Pro
vider. 
  
  
  
  
 Where to Get Your Medications 
  
 These medications were sent to [facility] - [address] [address] 
  Phone:  [phone] 
  levofloxacin 750 MG tablet
  
 You can get these medications from any pharmacy  
 Bring a paper prescription for each of these medications
  ipratropium-albuterol 0.5-2.5 mg/3mL
  predniSONE 20 MG tablet
  
 
 Discharge took >30 minutes, to include final examination, discussion of admission, and prep
aration of prescriptions, instructions for on-going care, follow-up and documentation of dis
charge summary.
 
 Onur Gates MD
 2018in this encounter
 
 Discharge Instructions
 Onur Gates MD - 2018Return home with previous orders
 Resume home meds as before
 Prednisone taper and complete the course of levaquinin this encounter
 
 Medications at Time of Discharge
 
 
+----------------------+----------------------+----------+---------+----------+----------+
| Medication           | Sig.                 | Disp.    | Refills | Start    | End Date |
|                      |                      |          |         | Date     |          |
+----------------------+----------------------+----------+---------+----------+----------+
|   Acetaminophen 500  | Take 1,000 mg by     |          |         |          |          |
| MG coapsule          | mouth 4 (four) times |          |         |          |          |
|                      |  daily.              |          |         |          |          |
+----------------------+----------------------+----------+---------+----------+----------+
|   albuterol          | Inhale 2 puffs into  |          |         |          |          |
| (PROVENTIL           | the lungs every 4    |          |         |          |          |
| HFA;VENTOLIN HFA)    | (four) hours as      |          |         |          |          |
 
| 108 (90 Base)        | needed for Wheezing. |          |         |          |          |
| MCG/ACT inhaler      |                      |          |         |          |          |
+----------------------+----------------------+----------+---------+----------+----------+
|   budesonide         | Take 0.5 mg by       |          |         |          |          |
| (PULMICORT) 0.5      | nebulization 2 (two) |          |         |          |          |
| MG/2ML nebulizer     |  times daily.        |          |         |          |          |
| suspension           |                      |          |         |          |          |
+----------------------+----------------------+----------+---------+----------+----------+
|   buPROPion          | Take 300 mg by mouth |          |         |          |          |
| (WELLBUTRIN XL) 300  |  every morning.      |          |         |          |          |
| MG 24 hr tablet      |                      |          |         |          |          |
+----------------------+----------------------+----------+---------+----------+----------+
|   dextromethorphan   | Take 60 mg by mouth  |          |         |          |          |
| polistirex (DELSYM)  | every 12 (twelve)    |          |         |          |          |
| 30 MG/5ML ER         | hours as needed for  |          |         |          |          |
| suspension           | Cough.               |          |         |          |          |
+----------------------+----------------------+----------+---------+----------+----------+
|   ergocalciferol     | Take 50,000 Units by |          |         |          |          |
| (DRISDOL) 18183      |  mouth once a week.  |          |         |          |          |
| units capsule        |                      |          |         |          |          |
+----------------------+----------------------+----------+---------+----------+----------+
|   furosemide (LASIX) | Take 20 mg by mouth  |          |         |          |          |
|  20 MG tablet        | daily.               |          |         |          |          |
+----------------------+----------------------+----------+---------+----------+----------+
|   guaifenesin        | Take 1,200 mg by     |          |         |          |          |
| (MUCINEX MAX) 1200   | mouth every 6 (six)  |          |         |          |          |
| MG 12hr tablet       | hours as needed.     |          |         |          |          |
+----------------------+----------------------+----------+---------+----------+----------+
|                      | Every 4 hours x 5    |   360 mL | 0       | 07/30/20 |          |
| ipratropium-albutero | days then Q4H prn    |          |         | 18       |          |
| l (DUO-NEB) 0.5-2.5  | for SOB or wheezing  |          |         |          |          |
| mg/3mL               |                      |          |         |          |          |
+----------------------+----------------------+----------+---------+----------+----------+
|   L-methylfolate     | Take 15 mg by mouth  |          |         |          |          |
| (DEPLIN) 15 MG       | daily with           |          |         |          |          |
| tablet               | breakfast.           |          |         |          |          |
+----------------------+----------------------+----------+---------+----------+----------+
|   levofloxacin       | Take 1 tablet by     |   5      | 0       | 20 |          |
| (LEVAQUIN) 750 MG    | mouth daily.         | tablet   |         | 18       |          |
| tablet               |                      |          |         |          |          |
+----------------------+----------------------+----------+---------+----------+----------+
|   lurasidone         | Take 40 mg by mouth  |          |         |          |          |
| (LATUDA) 40 MG       | daily.               |          |         |          |          |
| tablet               |                      |          |         |          |          |
+----------------------+----------------------+----------+---------+----------+----------+
|   Methylcobalamin    | Take 5,000 mcg by    |          |         |          |          |
| (METHYL B-12 PO)     | mouth 2 (two) times  |          |         |          |          |
|                      | daily.               |          |         |          |          |
+----------------------+----------------------+----------+---------+----------+----------+
|   OLANZapine         | Take 10 mg by mouth  |          |         |          |          |
| (ZYPREXA) 20 MG      | 2 (two) times daily. |          |         |          |          |
| tablet               |                      |          |         |          |          |
+----------------------+----------------------+----------+---------+----------+----------+
|   omeprazole         | Take 20 mg by mouth  |          |         |          |          |
| (PRILOSEC) 20 MG     | 2 (two) times daily. |          |         |          |          |
| capsule              |                      |          |         |          |          |
+----------------------+----------------------+----------+---------+----------+----------+
|   oxycodone (OXY-IR) | Take 1 capsule by    |   30     | 0       | / |          |
|  5 MG capsule        | mouth every 6 (six)  | capsule  |         | 18       |          |
|                      | hours as needed.     |          |         |          |          |
 
+----------------------+----------------------+----------+---------+----------+----------+
|   polyethylene       | Take 17 g by mouth   |          |         |          |          |
| glycol (GLYCOLAX)    | daily.               |          |         |          |          |
| packet               |                      |          |         |          |          |
+----------------------+----------------------+----------+---------+----------+----------+
|   predniSONE         | Take 2 tabs x 3 days |   6      | 0       | / |          |
| (DELTASONE) 20 MG    |  then stop           | tablet   |         | 18       |          |
| tablet               |                      |          |         |          |          |
+----------------------+----------------------+----------+---------+----------+----------+
|   propranolol        | Take 20 mg by mouth  |          |         |          |          |
| (INDERAL) 20 MG      | 2 (two) times daily. |          |         |          |          |
| tablet               |                      |          |         |          |          |
+----------------------+----------------------+----------+---------+----------+----------+
|   sertraline         | Take  mg by    |          |         |          |          |
| (ZOLOFT) 100 MG      | mouth See Admin      |          |         |          |          |
| tablet               | Instructions. Take   |          |         |          |          |
|                      | 100 mg by mouth      |          |         |          |          |
|                      | every morning and 50 |          |         |          |          |
|                      |  mg by mouth in the  |          |         |          |          |
|                      | afternoon            |          |         |          |          |
+----------------------+----------------------+----------+---------+----------+----------+
|   simvastatin        | Take 40 mg by mouth  |          |         |          |          |
| (ZOCOR) 40 MG tablet | nightly.             |          |         |          |          |
+----------------------+----------------------+----------+---------+----------+----------+
|   topiramate         | Take 25 mg by mouth  |          |         |          |          |
| (TOPAMAX) 25 MG      | 2 (two) times daily. |          |         |          |          |
| tablet               |                      |          |         |          |          |
+----------------------+----------------------+----------+---------+----------+----------+
|                      | Inhale 1 puff into   |   1 each | 11      | // |          |
| umeclidinium-vilante | the lungs daily.     |          |         | 18       |          |
| rol (ANORO ELLIPTA)  |                      |          |         |          |          |
| 62.5-25 MCG/INH      |                      |          |         |          |          |
| inhalerIndications:  |                      |          |         |          |          |
| Asthma with COPD     |                      |          |         |          |          |
| (chronic obstructive |                      |          |         |          |          |
|  pulmonary disease)  |                      |          |         |          |          |
| ([facility])                |                      |          |         |          |          |
+----------------------+----------------------+----------+---------+----------+----------+
 as of this encounter
 
 Progress Notes
 Onur Gates MD - 2018  7:47 AM PDTFormatting of this note may be different from the or
iginal.
 [facility]
 Service:  Hospitalist
 Progress Note
 
 Hospital Day:   LOS: 2 days 
 Hospital Course:
 69 y/o CM with PMH significant for pHTN, COPD, EMELINA, schizoaffective disorder admitted for S
OB.  Pt is a resident of a group home in [place]. Pt uses nocturnal o2 but refuses CPAP.  
Pt sees Dr. Contreras.  Pt wishes to be DNR/DNI.
 Pt has been having SOB, COTA, orthopnea, edema in legs, productive cough, wheezing.  
 
 Post-Op Day:  * No surgery found *
 SUBJECTIVE
 
      Events Overnight:  Had run of 10 PVC early this morning, afebrile.  Has not gotten out
 of bed, okay to work with PT.  Says no or I don't know to many answers
 
 
 OBJECTIVE
 Vital Signs:
 /73 (BP Location: Left upper arm)  | Pulse 63  | Temp 97.6 F (36.4 C) (Oral)  | R
tara 18  | Ht 1.753 m (5' 9")  | Wt 98.8 kg (217 lb 13 oz)  | SpO2 96%  | BMI 32.17 kg/m 
 Temp:  [97.6 F (36.4 C)-98 F (36.7 C)] 97.6 F (36.4 C) (725)
 BP: (102-139)/(56-76) 112/73 (725)
 Heart Rate:  [60-93] 63 (725)
 Resp:  [16-22] 18 (725)
 SpO2:  [94 %-100 %] 96 % (725)
 
 DATA
 
 Recent Labs
 Lab 18
 0420 18
 0447 18
 1740 
 WBC 12.32* 10.93 12.67* 
 HGB 13.3 13.0* 13.5 
 HCT 39.2 37.9* 40.7 
  180 194 
 NEUTOPHILPCT 80.24  --  78.22 
 MONOPCT 7.82  --  8.09 
 
 Recent Labs
 Lab 18
 0420 18
 0447 18 
  143 140 
 K 3.5 3.7 3.7 
 * 108 107 
 CO2 25 28 26 
 BUN 17 15 14 
 CREATININE 0.8 0.9 0.86 
 PROT 6.8 6.7 7.3 
 BILITOT 0.2 0.4 0.4 
 ALT 26 23 27 
 AST 20 14 15 
 
 Phosphorus:  
 
 Recent Labs
 Lab 187 
 PHOS 2.1* 
 
 Invalid input(s): LABALBU
 
 Recent Labs
 Lab 18
 044 
 MG 2.3 
 
 No results for input(s): AMYLASE in the last 168 hours.
 No results for input(s): LIPASE in the last 168 hours.
 
 Recent Labs
 Lab 18
 1941 
 
 BEART 0 
 
 Recent Labs
 Lab 18
 1800 18
 1740 
 APTT 31 29 SPECIMEN HEMOLYZED, WILL BE REDRAWN RN TO SEND 
 INR 1.1 1.1 SPECIMEN HEMOLYZED, WILL BE REDRAWN RN TO SEND 
 
 No results for input(s): TSH, T3FREE, FREET4 in the last 168 hours.
 
 Recent Labs
 Lab 18
 1740 
 CKTOTAL 51* SPECIMEN HEMOLYZED, WILL BE REDRAWN RN TO SEND 
 TROPONINI <0.020  --  
 CKMBINDEX UNABLE TO CALCULATE SPECIMEN HEMOLYZED, WILL BE REDRAWN RN TO SEND 
 
 Results  
  Procedure Component Value Units Date/Time 
  Sputum culture [[id number]] Collected:  18 0800 
  Specimen:  Sputum from Sputum Updated:  18 1513 
   Specimen Description SPUTUM 
   GRAM STAIN GREATER THAN 10 SEC/LPF 
   GRAM STAIN LESS THAN 10 WBCS/LPF 
   GRAM STAIN 3+ 
   GRAM STAIN GRAM POSITIVE COCCI 
   CULTURE SMEAR CONTAINS GREATER THAN 10 SEC/LPF SUGGESTIVE OF POOR QUALITY SPECIMEN.  SPEC
IMEN WILL NOT BE CULTURED OR WILL BE CULTURED BY SPECIAL REQUEST ONLY.  PLEASE RECOLLECT IF 
CLINICALLY INDICATED.  SPECIMEN WILL BE HELD 48 HOURS. 
  Respiratory Filmarray [[id number]] Collected:  18 2234 
  Specimen:  Nasopharyngeal Culture Updated:  18 0337 
   ADENOVIRUS Not Detected 
   CORONAVIRUS 229E Not Detected 
   CORONAVIRUS HKU1 Not Detected 
   CORONAVIRUS NL63 Not Detected 
   CORONAVIRUS OC43 Not Detected 
   HUMAN METAPNEUMOVIRUS Not Detected 
   HUMAN RHINO/ENTERO Not Detected 
   INFLUENZA A Not Detected 
   INFLUENZA B Not Detected 
   PARAINFLUENZA 1 Not Detected 
   PARAINFLUENZA 2 Not Detected 
   PARAINFLUENZA 3 Not Detected 
   PARAINFLUENZA 4 Not Detected 
   RESP SYNCYTIAL VIRUS Not Detected 
   BORDETELLA PERTUSSIS Not Detected 
   CHLAMYDIAE PNEUMONIAE Not Detected 
   MYCOPLASMA PNEUMONIAE Not Detected 
   RESP PANEL INTERP Testing performed by Molecular Methodology 
  
 
 Physical Exam 
 Constitutional: He is oriented to person, place, and time. He appears well-developed and we
ll-nourished. No distress. 
 Face mask
  
 HENT: 
 
 Head: Normocephalic and atraumatic. 
 Eyes: Pupils are equal, round, and reactive to light. EOM are normal. 
 Cardiovascular: Normal rate, regular rhythm and normal heart sounds.  
 Pulmonary/Chest: Effort normal and breath sounds normal. No respiratory distress. He has no
 wheezes. 
 Abdomina/Gl: Soft. Bowel sounds are normal. He exhibits no distension. There is no tenderne
ss. 
 Musculoskeletal: Normal range of motion. He exhibits edema (improved). 
 Neurological: He is alert and oriented to person, place, and time. 
 Skin: Skin is warm and dry. 
 
 Scheduled Medications 
   atorvastatin  20 mg Oral Nightly 
   budesonide  0.5 mg Nebulization 2 times daily 
   buPROPion  300 mg Oral QAM 
   enoxaparin  40 mg Subcutaneous Q24H 
   famotidine  20 mg Oral BID 
  Or 
   famotidine  20 mg Intravenous BID 
   furosemide  20 mg Oral Daily 
   ipratropium-albuterol  3 mL Nebulization Q4H WA 
   levofloxacin  500 mg Intravenous Q24H 
   lurasidone  40 mg Oral Daily 
   OLANZapine  10 mg Oral BID 
   pantoprazole  40 mg Oral QAM AC 
   predniSONE  50 mg Oral Daily with breakfast 
   propranolol  20 mg Oral BID 
   sertraline  100 mg Oral Daily 
   sertraline  50 mg Oral See Admin Instructions 
   sodium chloride (PF)  10 mL Intravenous Q8H 
   topiramate  25 mg Oral BID 
   umeclidinium-vilanterol  1 puff Inhalation Daily 
 
 Continuous Infusions 
 
 PRN Medications
 acetaminophen **OR** acetaminophen, polyethylene glycol, zolpidem
 
 Xr Chest Pa And Lateral
 
 Result Date: 2018
 Bilateral pulmonary vascular congestion. Bibasilar atelectasis. Electronically signed by Colt Blandon MD on 2018 7:10 PM
 
 Cta Chest Pulmonary Embolism W Iv Con
 
 Result Date: 2018
 1. Some images are degraded due to motion artifact. No obvious pulmonary emboli. 2. Emphyse
ma with pulmonary vascular congestion and bilateral infiltrate or atelectasis, right greater
 than left. 3. Dilated ascending aorta measuring 4.4 cm. No aortic dissection. 4. Marked tra
cheomalacia. RADIA  Electronically signed by Alex Williamson MD on 2018 12:56AM Referrin
krishan Provider Line: [phone] ID: [id number]
 
 Us Lower Extremity Venous Doppler Bilateral
 
 Result Date: 2018
 1.  No evidence of lower extremity deep vein thrombosis. Electronically signed by Mega barros MD on 2018 9:48 PM
 
 PROBLEM LIST
 
 
 Principal Problem:
   Hypoxemia
 Active Problems:
   BMI 31.0-31.9,adult
   Schizoaffective disorder (HCC)
   Leg swelling
   COPD (chronic obstructive pulmonary disease) ([facility])
 
 ASSESSMENT & PLAN
 
 1. Acute on chronic hypoxic respiratory failure: thought to be due to pna.  Pt uses nocturn
al oxygen.  CTA chest no obvious PE, emphysema with pulm vascular congestion and bilateral i
nfiltrate/atelectasis R>L and tracheomalacia.  LE US doppler negative.  CXR showed bilateral
 pulmonary vascular congestion, and atelectasis.  BNP normal.  RVP negative.  procalcitonin 
0.93.  Sputum culture not good specimen.   Bcx negative to date.  Recent 2D echo from 2018
 showed EF 60-65%.  Gentle diuresis, IV levaquin, prednisone and scheduled duoneb.
 
 2. COPD: prednisone, levaquin, scheduled duoneb.  H/o smoking 2-3 packs/day for 30 years, s
topped 20 years ago. 
 
 3. Schizoaffective disorder: continue home meds, stable.  Pt lives at group home
 
 4. Obesity BMI 32
 
 5. EMELINA pt refuses CPAP.  Continue nocturnal oxygen 2L.  Wean off oxygen during the day.
 
 6. DVT prophylaxis: lovenox.
 
 7. CAP: leukocytosis, no fever but elevated procalcitonin and infiltrates seen on CTA chest
 R > L. On levaquin.  No blood cultures done.  Recheck procalcitonin to see if trending down
.  Recurrent leukocytosis likely due to prednisone.
 
 8. 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 No evidence of cris
gnancy.
 
 Disposition:  inpatient
 Code Status:  DNR/DNI
 
 Onur Gates MD
 2018
 Onur Gates MD - 2018  7:52 AM PDTFormatting of this note may be different from the or
iginal.
 [facility]
 Service:  Hospitalist
 Progress Note
 
 Hospital Day:   LOS: 1 day 
 Hospital Course:
 69 y/o CM with PMH significant for pHTN, COPD, EMELINA, schizoaffective disorder admitted for S
OB.  Pt is a resident of a group home in [place]. Pt uses nocturnal o2 but refuses CPAP.  
Pt sees Dr. Contreras.  Pt wishes to be DNR/DNI.
 Pt has been having SOB, COTA, orthopnea, edema in legs, productive cough, wheezing.  
 
 Post-Op Day:  * No surgery found *
 SUBJECTIVE
 
      Events Overnight:  Feeling better, no complaints, says I don't know to many questions 
though
 
 
 OBJECTIVE
 Vital Signs:
 /61 (BP Location: Right upper arm)  | Pulse 88  | Temp 97.8 F (36.6 C) (Oral)  | 
Resp 22  | Ht 1.753 m (5' 9")  | Wt 98.8 kg (217 lb 13 oz)  | SpO2 95%  | BMI 32.17 kg/m 
 Temp:  [97.3 F (36.3 C)-98.3 F (36.8 C)] 97.8 F (36.6 C) (343)
 BP: ()/(55-77) 114/61 (343)
 Heart Rate:  [73-94] 88 (343)
 Resp:  [20-25] 22 (343)
 SpO2:  [85 %-96 %] 95 % (343)
 Height:  [175.3 cm (5' 9")] 175.3 cm (5' 9") (2000)
 Weight:  [95.3 kg (210 lb)-98.8 kg (217 lb 13 oz)] 98.8 kg (217 lb 13 oz) (343)
 BMI (Calculated):  [32.2] 32.2 (2000)
 
 DATA
 
 Recent Labs
 Lab 18
 0447 18
 1740 
 WBC 10.93 12.67* 
 HGB 13.0* 13.5 
 HCT 37.9* 40.7 
  194 
 NEUTOPHILPCT  --  78.22 
 MONOPCT  --  8.09 
 
 Recent Labs
 Lab 18
 0447 18
 1740 
  140 SPECIMEN HEMOLYZED, WILL BE REDRAWN RN TO SEND 
 K 3.7 3.7 SPECIMEN HEMOLYZED, WILL BE REDRAWN RN TO SEND 
  107 SPECIMEN HEMOLYZED, WILL BE REDRAWN RN TO SEND 
 CO2 28 26 SPECIMEN HEMOLYZED, WILL BE REDRAWN RN TO SEND 
 BUN 15 14 SPECIMEN HEMOLYZED, WILL BE REDRAWN RN TO SEND 
 CREATININE 0.9 0.86 SPECIMEN HEMOLYZED, WILL BE REDRAWN RN TO SEND 
 PROT 6.7 7.3 SPECIMEN HEMOLYZED, WILL BE REDRAWN RN TO SEND 
 BILITOT 0.4 0.4 SPECIMEN HEMOLYZED, WILL BE REDRAWN RN TO SEND 
 ALT 23 27 SPECIMEN HEMOLYZED, WILL BE REDRAWN RN TO SEND 
 AST 14 15 SPECIMEN HEMOLYZED, WILL BE REDRAWN RN TO SEND 
 
 Phosphorus:  
 Recent Labs
 Lab 18 
 PHOS 2.1* 
 
 Invalid input(s): LABALBU
 
 Recent Labs
 Lab 18 
 MG 2.3 
 
 No results for input(s): AMYLASE in the last 168 hours.
 No results for input(s): LIPASE in the last 168 hours.
 
 Recent Labs
 Lab 18
 
 1941 
 BEART 0 
 
 Recent Labs
 Lab 18
 1800 18
 1740 
 APTT 31 29 SPECIMEN HEMOLYZED, WILL BE REDRAWN RN TO SEND 
 INR 1.1 1.1 SPECIMEN HEMOLYZED, WILL BE REDRAWN RN TO SEND 
 
 No results for input(s): TSH, T3FREE, FREET4 in the last 168 hours.
 
 Recent Labs
 Lab 18
 1740 
 CKTOTAL 51* SPECIMEN HEMOLYZED, WILL BE REDRAWN RN TO SEND 
 TROPONINI <0.020  --  
 CKMBINDEX UNABLE TO CALCULATE SPECIMEN HEMOLYZED, WILL BE REDRAWN RN TO SEND 
 
 Results  
  Procedure Component Value Units Date/Time 
  Sputum culture [[id number]] Collected:  18 0800 
  Specimen:  Sputum from Sputum Updated:  18 0808 
  Respiratory Filmarray [[id number]] Collected:  18 2234 
  Specimen:  Nasopharyngeal Culture Updated:  18 0337 
   ADENOVIRUS Not Detected 
   CORONAVIRUS 229E Not Detected 
   CORONAVIRUS HKU1 Not Detected 
   CORONAVIRUS NL63 Not Detected 
   CORONAVIRUS OC43 Not Detected 
   HUMAN METAPNEUMOVIRUS Not Detected 
   HUMAN RHINO/ENTERO Not Detected 
   INFLUENZA A Not Detected 
   INFLUENZA B Not Detected 
   PARAINFLUENZA 1 Not Detected 
   PARAINFLUENZA 2 Not Detected 
   PARAINFLUENZA 3 Not Detected 
   PARAINFLUENZA 4 Not Detected 
   RESP SYNCYTIAL VIRUS Not Detected 
   BORDETELLA PERTUSSIS Not Detected 
   CHLAMYDIAE PNEUMONIAE Not Detected 
   MYCOPLASMA PNEUMONIAE Not Detected 
   RESP PANEL INTERP Testing performed by Molecular Methodology 
  
 
 Physical Exam 
 Constitutional: He is oriented to person, place, and time. He appears well-developed and we
ll-nourished. No distress. 
 Face mask
  
 HENT: 
 Head: Normocephalic and atraumatic. 
 Eyes: Pupils are equal, round, and reactive to light. EOM are normal. 
 Cardiovascular: Normal rate, regular rhythm and normal heart sounds.  
 Pulmonary/Chest: Effort normal and breath sounds normal. No respiratory distress. He has no
 wheezes. 
 Abdomina/Gl: Soft. Bowel sounds are normal. He exhibits no distension. There is no tenderne
ss. 
 
 Musculoskeletal: Normal range of motion. He exhibits edema (legs). 
 Neurological: He is alert and oriented to person, place, and time. 
 Skin: Skin is warm and dry. 
 
 Scheduled Medications   atorvastatin  20 mg Oral Nightly 
   budesonide  0.5 mg Nebulization 2 times daily 
   buPROPion  300 mg Oral QAM 
   enoxaparin  40 mg Subcutaneous Q24H 
   famotidine  20 mg Oral BID 
  Or 
   famotidine  20 mg Intravenous BID 
   furosemide  20 mg Oral Daily 
   ipratropium-albuterol  3 mL Nebulization Q4H WA 
   levofloxacin  500 mg Intravenous Q24H 
   lurasidone  40 mg Oral Daily 
   OLANZapine  10 mg Oral BID 
   pantoprazole  40 mg Oral QAM AC 
   predniSONE  50 mg Oral Daily with breakfast 
   propranolol  20 mg Oral BID 
   sertraline  100 mg Oral Daily 
   sertraline  50 mg Oral See Admin Instructions 
   sodium chloride (PF)  10 mL Intravenous Q8H 
   topiramate  25 mg Oral BID 
   umeclidinium-vilanterol  1 puff Inhalation Daily 
 
 Continuous Infusions 
 
 PRN Medications
 acetaminophen **OR** acetaminophen, polyethylene glycol, zolpidem
 
 Xr Chest Pa And Lateral
 
 Result Date: 2018
 Bilateral pulmonary vascular congestion. Bibasilar atelectasis. Electronically signed by Colt Blandon MD on 2018 7:10 PM
 
 Cta Chest Pulmonary Embolism W Iv Con
 
 Result Date: 2018
 1. Some images are degraded due to motion artifact. No obvious pulmonary emboli. 2. Emphyse
ma with pulmonary vascular congestion and bilateral infiltrate or atelectasis, right greater
 than left. 3. Dilated ascending aorta measuring 4.4 cm. No aortic dissection. 4. Marked tra
cheomalacia. RADIA  Electronically signed by Alex Williamson MD on 2018 12:56AM Martrin
krishan Provider Line: [phone] ID: [id number]
 
 Us Lower Extremity Venous Doppler Bilateral
 
 Result Date: 2018
 1.  No evidence of lower extremity deep vein thrombosis. Electronically signed by Mega barros MD on 2018 9:48 PM
 
 PROBLEM LIST
 
 Principal Problem:
   Hypoxemia
 Active Problems:
   BMI 31.0-31.9,adult
   Schizoaffective disorder (HCC)
   Leg swelling
   COPD (chronic obstructive pulmonary disease) ([facility])
 
 
 ASSESSMENT & PLAN
 
 1. Acute on chronic hypoxic respiratory failure: pt uses nocturnal oxygen.  CTA chest no ob
vious PE, emphysema with pulm vascular congestion and bilateral infiltrate/atelectasis R>L a
nd tracheomalacia.  LE US doppler negative.  CXR showed bilateral pulmonary vascular congest
ion, and atelectasis.  BNP normal.  RVP negative.  procalcitonin 0.93.  Sputum culture not g
ood specimen. 
 
 2. COPD: prednisone, levaquin, scheduled duoneb.  H/o smoking 2-3 packs/day for 30 years, s
topped 20 years ago.  
 
 3. Schizoaffective disorder: continue home meds, stable.  Pt lives at group home
 
 4. Obesity BMI 32
 
 5. EMELINA pt refuses CPAP.  Continue nocturnal oxygen 2L.
 
 6. DVT prophylaxis: lovenox.
 
 7. CAP: leukocytosis resolved, no fever but elevated procalcitonin and infiltrates seen on 
CTA chest R > L. On levaquin.  No blood cultures done.  Recheck procalcitonin to see if cheikh
ding down.
 
 8. 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 No evidence of cris
gnancy.
 
 Disposition:  inpatient
 Code Status:  Full Code
 
 Onur Gates MD
 2018
 Altaf Temple, [facility] - 2018  9:03 PM PDTNote ccl 92.3ml/min   meds reviewed   pharma
cy will follow  [facility]  [address]in this encounter
 
 Plan of Treatment
 
 
+--------+---------+-------------+----------------------+-------------+
| Date   | Type    | Specialty   | Care Team            | Description |
+--------+---------+-------------+----------------------+-------------+
| 10/19/ | Office  | Pulmonology |   Ebn,          |             |
| 2018   | Visit   |             | Edwina Mckeon,   |             |
|        |         |             | MD [address] |             |
|        |         |             |   [place] [address] |             |
|        |         |             |   [phone]       |             |
|        |         |             | [phone] (Fax)   |             |
+--------+---------+-------------+----------------------+-------------+
 as of this encounter
 
 Procedures
 
 
+----------------------+--------+-------------+----------------------+----------------------
+
| Procedure Name       | Priori | Date/Time   | Associated Diagnosis | Comments             
|
|                      | ty     |             |                      |                      
|
+----------------------+--------+-------------+----------------------+----------------------
 
+
| PROCALCITONIN        | Routin | 2018  |                      |   Results for this   
|
|                      | e - AM |  4:25 AM    |                      | procedure are in the 
|
|                      |        | PDT         |                      |  results section.    
|
+----------------------+--------+-------------+----------------------+----------------------
+
| CBC W/AUTO DIFF      | Routin | 2018  |                      |   Results for this   
|
| (REFLEX TO MANUAL)   | e      |  4:25 AM    |                      | procedure are in the 
|
|                      |        | PDT         |                      |  results section.    
|
+----------------------+--------+-------------+----------------------+----------------------
+
| BASIC METABOLIC      | Routin | 2018  |                      |   Results for this   
|
| PANEL                | e - AM |  4:25 AM    |                      | procedure are in the 
|
|                      |        | PDT         |                      |  results section.    
|
+----------------------+--------+-------------+----------------------+----------------------
+
| CBC W/AUTO DIFF      | Routin | 2018  |                      |   Results for this   
|
| (REFLEX TO MANUAL)   | e      |  4:20 AM    |                      | procedure are in the 
|
|                      |        | PDT         |                      |  results section.    
|
+----------------------+--------+-------------+----------------------+----------------------
+
| COMPREHENSIVE        | Routin | 2018  |                      |   Results for this   
|
| METABOLIC PANEL      | e      |  4:20 AM    |                      | procedure are in the 
|
|                      |        | PDT         |                      |  results section.    
|
+----------------------+--------+-------------+----------------------+----------------------
+
| SPUTUM CULT W/ GRAM  | Timed  | 2018  |                      |   Results for this   
|
| STAIN                |        |  8:00 AM    |                      | procedure are in the 
|
|                      |        | PDT         |                      |  results section.    
|
+----------------------+--------+-------------+----------------------+----------------------
+
| CBC W/AUTO DIFF      | Routin | 2018  |                      |   Results for this   
|
| (REFLEX TO MANUAL)   | e      |  4:47 AM    |                      | procedure are in the 
|
|                      |        | PDT         |                      |  results section.    
|
+----------------------+--------+-------------+----------------------+----------------------
+
| PHOSPHOROUS          | Routin | 2018  |                      |   Results for this   
|
|                      | e - AM |  4:47 AM    |                      | procedure are in the 
 
|
|                      |        | PDT         |                      |  results section.    
|
+----------------------+--------+-------------+----------------------+----------------------
+
| MAGNESIUM            | Routin | 2018  |                      |   Results for this   
|
|                      | e - AM |  4:47 AM    |                      | procedure are in the 
|
|                      |        | PDT         |                      |  results section.    
|
+----------------------+--------+-------------+----------------------+----------------------
+
| COMPREHENSIVE        | Routin | 2018  |                      |   Results for this   
|
| METABOLIC PANEL      | e      |  4:47 AM    |                      | procedure are in the 
|
|                      |        | PDT         |                      |  results section.    
|
+----------------------+--------+-------------+----------------------+----------------------
+
| CTA CHEST PULMONARY  | STAT   | 2018  |                      |   Results for this   
|
| EMBOLISM W CONTRAST  |        | 11:44 PM    |                      | procedure are in the 
|
|                      |        | PDT         |                      |  results section.    
|
+----------------------+--------+-------------+----------------------+----------------------
+
| POC ARTERIAL BLOOD   | Routin | 2018  |                      |   Results for this   
|
| GAS                  | e      | 11:11 PM    |                      | procedure are in the 
|
|                      |        | PDT         |                      |  results section.    
|
+----------------------+--------+-------------+----------------------+----------------------
+
| RESPIRATORY          | Timed  | 2018  |                      |   Results for this   
|
| FILMARRAY            |        | 10:34 PM    |                      | procedure are in the 
|
|                      |        | PDT         |                      |  results section.    
|
+----------------------+--------+-------------+----------------------+----------------------
+
| US LOWER EXTREMITY   | STAT   | 2018  |                      |   Results for this   
|
| VENOUS DOPPLER BILAT |        |  9:50 PM    |                      | procedure are in the 
|
|                      |        | PDT         |                      |  results section.    
|
+----------------------+--------+-------------+----------------------+----------------------
+
| [facility] SEPTIC LACTIC   | STAT   | 2018  |                      |   Results for this   
|
| ACID                 |        |  8:09 PM    |                      | procedure are in the 
|
|                      |        | PDT         |                      |  results section.    
|
+----------------------+--------+-------------+----------------------+----------------------
 
+
| PROCALCITONIN        | Timed  | 2018  |                      |   Results for this   
|
|                      |        |  8:09 PM    |                      | procedure are in the 
|
|                      |        | PDT         |                      |  results section.    
|
+----------------------+--------+-------------+----------------------+----------------------
+
| CK MB                | STAT   | 2018  |                      |   Results for this   
|
|                      |        |  8:09 PM    |                      | procedure are in the 
|
|                      |        | PDT         |                      |  results section.    
|
+----------------------+--------+-------------+----------------------+----------------------
+
| TROPONIN I           | STAT   | 2018  |                      |   Results for this   
|
|                      |        |  8:09 PM    |                      | procedure are in the 
|
|                      |        | PDT         |                      |  results section.    
|
+----------------------+--------+-------------+----------------------+----------------------
+
| APTT                 | STAT   | 2018  |                      |   Results for this   
|
|                      |        |  8:09 PM    |                      | procedure are in the 
|
|                      |        | PDT         |                      |  results section.    
|
+----------------------+--------+-------------+----------------------+----------------------
+
| PROTIME-INR          | STAT   | 2018  |                      |   Results for this   
|
|                      |        |  8:09 PM    |                      | procedure are in the 
|
|                      |        | PDT         |                      |  results section.    
|
+----------------------+--------+-------------+----------------------+----------------------
+
| D-DIMER,             | STAT   | 2018  |                      |   Results for this   
|
| QUANTITATIVE         |        |  8:09 PM    |                      | procedure are in the 
|
|                      |        | PDT         |                      |  results section.    
|
+----------------------+--------+-------------+----------------------+----------------------
+
| CK                   | STAT   | 2018  |                      |   Results for this   
|
|                      |        |  8:09 PM    |                      | procedure are in the 
|
|                      |        | PDT         |                      |  results section.    
|
+----------------------+--------+-------------+----------------------+----------------------
+
| COMPREHENSIVE        | STAT   | 2018  |                      |   Results for this   
|
| METABOLIC PANEL      |        |  8:09 PM    |                      | procedure are in the 
 
|
|                      |        | PDT         |                      |  results section.    
|
+----------------------+--------+-------------+----------------------+----------------------
+
| POC ARTERIAL BLOOD   | Routin | 2018  |                      |   Results for this   
|
| GAS                  | e      |  7:41 PM    |                      | procedure are in the 
|
|                      |        | PDT         |                      |  results section.    
|
+----------------------+--------+-------------+----------------------+----------------------
+
| ABG DRAW             | STAT   | 2018  |                      |                      
|
|                      |        |  7:19 PM    |                      |                      
|
|                      |        | PDT         |                      |                      
|
+----------------------+--------+-------------+----------------------+----------------------
+
| XR CHEST 2 VIEW      | ASAP   | 2018  |                      |   Results for this   
|
| FRONTAL AND LATERAL  |        |  7:04 PM    |                      | procedure are in the 
|
|                      |        | PDT         |                      |  results section.    
|
+----------------------+--------+-------------+----------------------+----------------------
+
| APTT                 | STAT   | 2018  |                      |   Results for this   
|
|                      |        |  6:00 PM    |                      | procedure are in the 
|
|                      |        | PDT         |                      |  results section.    
|
+----------------------+--------+-------------+----------------------+----------------------
+
| PROTIME-INR          | STAT   | 2018  |                      |   Results for this   
|
|                      |        |  6:00 PM    |                      | procedure are in the 
|
|                      |        | PDT         |                      |  results section.    
|
+----------------------+--------+-------------+----------------------+----------------------
+
| [facility] CARD PANEL W/O   | STAT   | 2018  |                      |   Results for this   
|
| TRP (ED ONLY)        |        |  5:40 PM    |                      | procedure are in the 
|
|                      |        | PDT         |                      |  results section.    
|
+----------------------+--------+-------------+----------------------+----------------------
+
| BRAIN NATRIURETIC    | STAT   | 2018  |                      |   Results for this   
|
| PEPTIDE              |        |  5:40 PM    |                      | procedure are in the 
|
|                      |        | PDT         |                      |  results section.    
|
+----------------------+--------+-------------+----------------------+----------------------
 
+
| EKG 12 LEAD UNIT     | STAT   | 2018  |                      |   Results for this   
|
| PERFORMED            |        |  5:31 PM    |                      | procedure are in the 
|
|                      |        | PDT         |                      |  results section.    
|
+----------------------+--------+-------------+----------------------+----------------------
+
| NEBULIZER/INHALATION | STAT   | 2018  |                      |                      
|
|  TREATMENT           |        |  5:22 PM    |                      |                      
|
|                      |        | PDT         |                      |                      
|
+----------------------+--------+-------------+----------------------+----------------------
+
| ED INFORMATION       | Routin | 2018  |                      |   Results for this   
|
| EXCHANGE             | e      |  3:51 PM    |                      | procedure are in the 
|
|                      |        | PDT         |                      |  results section.    
|
+----------------------+--------+-------------+----------------------+----------------------
+
 in this encounter
 
 Results
 PROCALCITONIN (2018  4:25 AM)
 
+---------------+--------------------------+------------+-----------------+
| Component     | Value                    | Ref Range  | Performed At    |
+---------------+--------------------------+------------+-----------------+
| PROCALCITONIN | 0.15Comment:             | <0.5 ng/mL | [facility] LABORATORY |
|               | INTERPRETIVE             |            |                 |
|               | INFORMATION:    PROCALCI |            |                 |
|               | TONIN PCT <= 0.5         |            |                 |
|               | ng/mL:        Low risk   |            |                 |
|               | for progression to       |            |                 |
|               | severe                   |            |                 |
|               | systemic        bacteria |            |                 |
|               | l infection (severe      |            |                 |
|               | sepsis/septic            |            |                 |
|               | shock).        Does not  |            |                 |
|               | exclude an infection,    |            |                 |
|               | because                  |            |                 |
|               | localized        infecti |            |                 |
|               | ons may be associated    |            |                 |
|               | with such low            |            |                 |
|               | levels.        If PCT is |            |                 |
|               |  measured very early     |            |                 |
|               | after                    |            |                 |
|               | bacterial        challen |            |                 |
|               | ge (usually <6 hours),   |            |                 |
|               | results may still        |            |                 |
|               | be        low and should |            |                 |
|               |  re-assess PCT 6-24      |            |                 |
|               | hours later. PCT >0.5    |            |                 |
|               | and <= 2                 |            |                 |
|               | ng/mL:        Moderate   |            |                 |
 
|               | risk for progression to  |            |                 |
|               | severe                   |            |                 |
|               | systemic        infectio |            |                 |
|               | n (severe sepsis/septic  |            |                 |
|               | shock).    Other         |            |                 |
|               | conditions are known to  |            |                 |
|               | elevate PCT, patient     |            |                 |
|               |         should be        |            |                 |
|               | closely monitored both   |            |                 |
|               | clinically and           |            |                 |
|               | by        re-assessing   |            |                 |
|               | PCT within 6-24 hours.   |            |                 |
|               | PCT > 2                  |            |                 |
|               | ng/mL:        High       |            |                 |
|               | likelihood for           |            |                 |
|               | progression to severe    |            |                 |
|               | systemic        bacteria |            |                 |
|               | l infection (severe      |            |                 |
|               | sepsis/septic shock).    |            |                 |
|               | PCT >= 10                |            |                 |
|               | ng/mL:        High       |            |                 |
|               | likelihood of severe     |            |                 |
|               | sepsis or septic         |            |                 |
|               | shock.Testing performed  |            |                 |
|               | at [facility];[address] [place] [address]         |            |                 |
|               | [facility];[place] [address]   |            |                 |
+---------------+--------------------------+------------+-----------------+
 
 
 
+-------------------+------------------+--------------------+--------------+
| Performing        | Address          | City/State/Zipcode | Phone Number |
| Organization      |                  |                    |              |
+-------------------+------------------+--------------------+--------------+
|   [facility] LAB[facility]AT[facility] |   [address] | [place] [address] |              |
+-------------------+------------------+--------------------+--------------+
 Basic metabolic panel (2018  4:25 AM)
 
+----------------+--------------------------+-------------------+--------------+
| Component      | Value                    | Ref Range         | Performed At |
+----------------+--------------------------+-------------------+--------------+
| SODIUM         | 144                      | 135 - 145 mmol/L  | [facility]   |
|                |                          |                   | LABORATORY   |
+----------------+--------------------------+-------------------+--------------+
| POTASSIUM      | 3.4 (L)                  | 3.5 - 4.9 mmol/L  | [facility]   |
|                |                          |                   | LABORATORY   |
+----------------+--------------------------+-------------------+--------------+
| CHLORIDE       | 107                      | 99 - 109 mmol/L   | [facility]   |
|                |                          |                   | LABORATORY   |
+----------------+--------------------------+-------------------+--------------+
| CO2            | 26                       | 23 - 32 mmol/L    | [facility]   |
|                |                          |                   | LABORATORY   |
+----------------+--------------------------+-------------------+--------------+
| ANION GAP AGAP | 14                       | 5 - 20 mmol/L     | [facility]   |
|                |                          |                   | LABORATORY   |
+----------------+--------------------------+-------------------+--------------+
| GLUCOSE        | 106 (H)                  | 65 - 99 mg/dL     | [facility]   |
|                |                          |                   | LABORATORY   |
+----------------+--------------------------+-------------------+--------------+
| BUN            | 17                       | 8 - 25 mg/dL      | [facility]   |
 
|                |                          |                   | LABORATORY   |
+----------------+--------------------------+-------------------+--------------+
| CREATININE     | 0.8                      | 0.70 - 1.30 mg/dL | [facility]   |
|                |                          |                   | LABORATORY   |
+----------------+--------------------------+-------------------+--------------+
| BUN/CREAT      | 21                       |                   | [facility]   |
|                |                          |                   | LABORATORY   |
+----------------+--------------------------+-------------------+--------------+
| CALCIUM        | 8.8                      | 8.5 - 10.5 mg/dL  | [facility]   |
|                |                          |                   | LABORATORY   |
+----------------+--------------------------+-------------------+--------------+
| EGFR           | >60Comment: GFR <60:     | >60 mL/min/1.73m2 | [facility]   |
|                | CHRONIC KIDNEY DISEASE,  |                   | LABORATORY   |
|                | IF FOUND OVER A 3 MONTH  |                   |              |
|                | PERIOD.GFR <15: KIDNEY   |                   |              |
|                | FAILURE.FOR       |                   |              |
|                | [employer], MULTIPLY THE  |                   |              |
|                | CALCULATED GFR BY        |                   |              |
|                | 1.210.This eGFR is       |                   |              |
|                | calculated using the     |                   |              |
|                | MDRD IDMS traceable      |                   |              |
|                | equation.Testing         |                   |              |
|                | performed at [facility], [address] |                   |              |
|                |  [facility],        |                   |              |
|                | [place]    [address]   |                   |              |
+----------------+--------------------------+-------------------+--------------+
 
 
 
+----------+
| Specimen |
+----------+
| Blood    |
+----------+
 
 
 
+---------------+-------------------------+---------------------+----------------+
| Performing    | Address                 | City/State/Zipcode  | Phone Number   |
| Organization  |                         |                     |                |
+---------------+-------------------------+---------------------+----------------+
|   [facility]  |   [address] [place] [address]  | [place] [address] |   [phone] |
| LABORATORY    | You.                   |                     |                |
+---------------+-------------------------+---------------------+----------------+
 CBC W/Auto Diff (Reflex to Manual) (2018  4:25 AM)
 
+-----------------+--------------------------+-------------------+--------------+
| Component       | Value                    | Ref Range         | Performed At |
+-----------------+--------------------------+-------------------+--------------+
| WBC             | 11.28 (H)                | 3.80 - 11.00 K/uL | [facility]   |
|                 |                          |                   | LABORATORY   |
+-----------------+--------------------------+-------------------+--------------+
| RBC             | 4.04 (L)                 | 4.20 - 5.70 M/uL  | [facility]   |
|                 |                          |                   | LABORATORY   |
+-----------------+--------------------------+-------------------+--------------+
| HGB             | 13.1 (L)                 | 13.2 - 17.0 g/dL  | [facility]   |
|                 |                          |                   | LABORATORY   |
+-----------------+--------------------------+-------------------+--------------+
| HCT             | 38.8 (L)                 | 39.0 - 50.0 %     | [facility]   |
|                 |                          |                   | LABORATORY   |
 
+-----------------+--------------------------+-------------------+--------------+
| MCV             | 96.1                     | 80.0 - 100.0 fl   | [facility]   |
|                 |                          |                   | LABORATORY   |
+-----------------+--------------------------+-------------------+--------------+
| MCH             | 32.3                     | 27.0 - 34.0 pg    | [facility]   |
|                 |                          |                   | LABORATORY   |
+-----------------+--------------------------+-------------------+--------------+
| MCHC            | 33.6                     | 32.0 - 35.5 g/dL  | [facility]   |
|                 |                          |                   | LABORATORY   |
+-----------------+--------------------------+-------------------+--------------+
| RDW SD          | 48.6                     | 37 - 53 fl        | [facility]   |
|                 |                          |                   | LABORATORY   |
+-----------------+--------------------------+-------------------+--------------+
| PLT             | 196                      | 150 - 400 K/uL    | [facility]   |
|                 |                          |                   | LABORATORY   |
+-----------------+--------------------------+-------------------+--------------+
| MPV             | 9.9                      | fl                | [facility]   |
|                 |                          |                   | LABORATORY   |
+-----------------+--------------------------+-------------------+--------------+
| DIFF TYPE       | AUTOMATED                |                   | [facility]   |
|                 |                          |                   | LABORATORY   |
+-----------------+--------------------------+-------------------+--------------+
| NEUTROPHILS     | 77.94                    | %                 | [facility]   |
|                 |                          |                   | LABORATORY   |
+-----------------+--------------------------+-------------------+--------------+
| LYMPHOCYTES     | 13.46                    | %                 | [facility]   |
|                 |                          |                   | LABORATORY   |
+-----------------+--------------------------+-------------------+--------------+
| MONOCYTES       | 7.79                     | %                 | [facility]   |
|                 |                          |                   | LABORATORY   |
+-----------------+--------------------------+-------------------+--------------+
| EOSINOPHILS     | 0.36                     | %                 | [facility]   |
|                 |                          |                   | LABORATORY   |
+-----------------+--------------------------+-------------------+--------------+
| BASOPHILS       | 0.45                     | %                 | [facility]   |
|                 |                          |                   | LABORATORY   |
+-----------------+--------------------------+-------------------+--------------+
| NEUTROPHILS ABS | 8.79 (H)                 | 1.90 - 7.40 K/uL  | [facility]   |
|                 |                          |                   | LABORATORY   |
+-----------------+--------------------------+-------------------+--------------+
| LYMPHOCYTES ABS | 1.52                     | 1.00 - 3.90 K/uL  | [facility]   |
|                 |                          |                   | LABORATORY   |
+-----------------+--------------------------+-------------------+--------------+
| MONOCYTES ABS   | 0.88 (H)                 | 0.00 - 0.80 K/uL  | [facility]   |
|                 |                          |                   | LABORATORY   |
+-----------------+--------------------------+-------------------+--------------+
| EOSINOPHILS ABS | 0.04                     | 0.00 - 0.50 K/uL  | [facility]   |
|                 |                          |                   | LABORATORY   |
+-----------------+--------------------------+-------------------+--------------+
| BASOPHILS ABS   | 0.05Comment: Testing     | 0.00 - 0.10 K/uL  | [facility]   |
|                 | performed at [facility], [address] |                   | LABORATORY   |
|                 |  [place],        |                   |              |
|                 | [place]  [id number]     |                   |              |
+-----------------+--------------------------+-------------------+--------------+
 
 
 
+----------+
| Specimen |
+----------+
 
| Blood    |
+----------+
 
 
 
+---------------+-------------------------+---------------------+----------------+
| Performing    | Address                 | City/State/Zipcode  | Phone Number   |
| Organization  |                         |                     |                |
+---------------+-------------------------+---------------------+----------------+
|   [facility]  |   [address]  | [place] [address] |   [phone] |
| LABORATORY    | [address]                   |                     |                |
+---------------+-------------------------+---------------------+----------------+
 Comprehensive Metabolic Panel (2018  4:20 AM)
 
+----------------+--------------------------+-------------------+--------------+
| Component      | Value                    | Ref Range         | Performed At |
+----------------+--------------------------+-------------------+--------------+
| SODIUM         | 143                      | 135 - 145 mmol/L  | [facility]   |
|                |                          |                   | LABORATORY   |
+----------------+--------------------------+-------------------+--------------+
| POTASSIUM      | 3.5                      | 3.5 - 4.9 mmol/L  | [facility]   |
|                |                          |                   | LABORATORY   |
+----------------+--------------------------+-------------------+--------------+
| CHLORIDE       | 110 (H)                  | 99 - 109 mmol/L   | [facility]   |
|                |                          |                   | LABORATORY   |
+----------------+--------------------------+-------------------+--------------+
| CO2            | 25                       | 23 - 32 mmol/L    | [facility]   |
|                |                          |                   | LABORATORY   |
+----------------+--------------------------+-------------------+--------------+
| ANION GAP AGAP | 12                       | 5 - 20 mmol/L     | [facility]   |
|                |                          |                   | LABORATORY   |
+----------------+--------------------------+-------------------+--------------+
| GLUCOSE        | 129 (H)                  | 65 - 99 mg/dL     | [facility]   |
|                |                          |                   | LABORATORY   |
+----------------+--------------------------+-------------------+--------------+
| BUN            | 17                       | 8 - 25 mg/dL      | [facility]   |
|                |                          |                   | LABORATORY   |
+----------------+--------------------------+-------------------+--------------+
| CREATININE     | 0.8                      | 0.70 - 1.30 mg/dL | [facility]   |
|                |                          |                   | LABORATORY   |
+----------------+--------------------------+-------------------+--------------+
| BUN/CREAT      | 21                       |                   | [facility]   |
|                |                          |                   | LABORATORY   |
+----------------+--------------------------+-------------------+--------------+
| CALCIUM        | 8.5                      | 8.5 - 10.5 mg/dL  | [facility]   |
|                |                          |                   | LABORATORY   |
+----------------+--------------------------+-------------------+--------------+
| TOTAL PROTEIN  | 6.8                      | 6.3 - 8.2 g/dL    | [facility]   |
|                |                          |                   | LABORATORY   |
+----------------+--------------------------+-------------------+--------------+
| Albumin        | 2.8 (L)                  | 3.3 - 4.8 g/dL    | [facility]   |
|                |                          |                   | LABORATORY   |
+----------------+--------------------------+-------------------+--------------+
| GLOBULIN       | 4.0                      | 1.3 - 4.9 g/dL    | [facility]   |
|                |                          |                   | LABORATORY   |
+----------------+--------------------------+-------------------+--------------+
| A/G            | 0.7 (L)                  | 1.0 - 2.4         | [facility]   |
|                |                          |                   | LABORATORY   |
+----------------+--------------------------+-------------------+--------------+
| TBIL           | 0.2                      | 0.1 - 1.5 mg/dL   | [facility]   |
 
|                |                          |                   | LABORATORY   |
+----------------+--------------------------+-------------------+--------------+
| ALK PHOS       | 76                       | 35 - 115 U/L      | [facility]   |
|                |                          |                   | LABORATORY   |
+----------------+--------------------------+-------------------+--------------+
| AST            | 20                       | 10 - 45 U/L       | [facility]   |
|                |                          |                   | LABORATORY   |
+----------------+--------------------------+-------------------+--------------+
| ALT            | 26                       | 10 - 65 U/L       | [facility]   |
|                |                          |                   | LABORATORY   |
+----------------+--------------------------+-------------------+--------------+
| EGFR           | >60Comment: GFR <60:     | >60 mL/min/1.73m2 | [facility]   |
|                | CHRONIC KIDNEY DISEASE,  |                   | LABORATORY   |
|                | IF FOUND OVER A 3 MONTH  |                   |              |
|                | PERIOD.GFR <15: KIDNEY   |                   |              |
|                | FAILURE.FOR       |                   |              |
|                | [employer], MULTIPLY THE  |                   |              |
|                | CALCULATED GFR BY        |                   |              |
|                | 1.210.This eGFR is       |                   |              |
|                | calculated using the     |                   |              |
|                | MDRD IDMS traceable      |                   |              |
|                | equation.Testing         |                   |              |
|                | performed at [facility], [address] |                   |              |
|                |  [facility],        |                   |              |
|                | [place]    [address]   |                   |              |
+----------------+--------------------------+-------------------+--------------+
 
 
 
+----------+
| Specimen |
+----------+
| Blood    |
+----------+
 
 
 
+---------------+-------------------------+---------------------+----------------+
| Performing    | Address                 | City/State/Zipcode  | Phone Number   |
| Organization  |                         |                     |                |
+---------------+-------------------------+---------------------+----------------+
|   [facility]  |   [address] [place] [address]  | [place] [address] |   [phone] |
| LABORATORY    | [facility].                   |                     |                |
+---------------+-------------------------+---------------------+----------------+
 CBC W/Auto Diff (Reflex to Manual) (2018  4:20 AM)
 
+-----------------+--------------------------+-------------------+--------------+
| Component       | Value                    | Ref Range         | Performed At |
+-----------------+--------------------------+-------------------+--------------+
| WBC             | 12.32 (H)                | 3.80 - 11.00 K/uL | [facility]   |
|                 |                          |                   | LABORATORY   |
+-----------------+--------------------------+-------------------+--------------+
| RBC             | 4.04 (L)                 | 4.20 - 5.70 M/uL  | [facility]   |
|                 |                          |                   | LABORATORY   |
+-----------------+--------------------------+-------------------+--------------+
| HGB             | 13.3                     | 13.2 - 17.0 g/dL  | [facility]   |
|                 |                          |                   | LABORATORY   |
+-----------------+--------------------------+-------------------+--------------+
| HCT             | 39.2                     | 39.0 - 50.0 %     | [facility]   |
|                 |                          |                   | LABORATORY   |
 
+-----------------+--------------------------+-------------------+--------------+
| MCV             | 96.8                     | 80.0 - 100.0 fl   | [facility]   |
|                 |                          |                   | LABORATORY   |
+-----------------+--------------------------+-------------------+--------------+
| MCH             | 32.8                     | 27.0 - 34.0 pg    | [facility]   |
|                 |                          |                   | LABORATORY   |
+-----------------+--------------------------+-------------------+--------------+
| MCHC            | 33.9                     | 32.0 - 35.5 g/dL  | [facility]   |
|                 |                          |                   | LABORATORY   |
+-----------------+--------------------------+-------------------+--------------+
| RDW SD          | 49.0                     | 37 - 53 fl        | [facility]   |
|                 |                          |                   | LABORATORY   |
+-----------------+--------------------------+-------------------+--------------+
| PLT             | 186                      | 150 - 400 K/uL    | [facility]   |
|                 |                          |                   | LABORATORY   |
+-----------------+--------------------------+-------------------+--------------+
| MPV             | 9.9                      | fl                | [facility]   |
|                 |                          |                   | LABORATORY   |
+-----------------+--------------------------+-------------------+--------------+
| DIFF TYPE       | AUTOMATED                |                   | [facility]   |
|                 |                          |                   | LABORATORY   |
+-----------------+--------------------------+-------------------+--------------+
| NEUTROPHILS     | 80.24                    | %                 | [facility]   |
|                 |                          |                   | LABORATORY   |
+-----------------+--------------------------+-------------------+--------------+
| LYMPHOCYTES     | 11.24                    | %                 | [facility]   |
|                 |                          |                   | LABORATORY   |
+-----------------+--------------------------+-------------------+--------------+
| MONOCYTES       | 7.82                     | %                 | [facility]   |
|                 |                          |                   | LABORATORY   |
+-----------------+--------------------------+-------------------+--------------+
| EOSINOPHILS     | 0.39                     | %                 | [facility]   |
|                 |                          |                   | LABORATORY   |
+-----------------+--------------------------+-------------------+--------------+
| BASOPHILS       | 0.31                     | %                 | [facility]   |
|                 |                          |                   | LABORATORY   |
+-----------------+--------------------------+-------------------+--------------+
| NEUTROPHILS ABS | 9.89 (H)                 | 1.90 - 7.40 K/uL  | [facility]   |
|                 |                          |                   | LABORATORY   |
+-----------------+--------------------------+-------------------+--------------+
| LYMPHOCYTES ABS | 1.38                     | 1.00 - 3.90 K/uL  | [facility]   |
|                 |                          |                   | LABORATORY   |
+-----------------+--------------------------+-------------------+--------------+
| MONOCYTES ABS   | 0.96 (H)                 | 0.00 - 0.80 K/uL  | [facility]   |
|                 |                          |                   | LABORATORY   |
+-----------------+--------------------------+-------------------+--------------+
| EOSINOPHILS ABS | 0.05                     | 0.00 - 0.50 K/uL  | [facility]   |
|                 |                          |                   | LABORATORY   |
+-----------------+--------------------------+-------------------+--------------+
| BASOPHILS ABS   | 0.04Comment: Testing     | 0.00 - 0.10 K/uL  | [facility]   |
|                 | performed at [facility], [address] |                   | LABORATORY   |
|                 |  [place],        |                   |              |
|                 | [place]  [id number]     |                   |              |
+-----------------+--------------------------+-------------------+--------------+
 
 
 
+----------+
| Specimen |
+----------+
 
| Blood    |
+----------+
 
 
 
+---------------+-------------------------+---------------------+----------------+
| Performing    | Address                 | City/State/Zipcode  | Phone Number   |
| Organization  |                         |                     |                |
+---------------+-------------------------+---------------------+----------------+
|   [facility]  |   [address]  | [place] [address] |   [phone] |
| LABORATORY    | [address]                   |                     |                |
+---------------+-------------------------+---------------------+----------------+
 Sputum culture (2018  8:00 AM)
 
+----------------------+--------------------------+-----------+--------------+
| Component            | Value                    | Ref Range | Performed At |
+----------------------+--------------------------+-----------+--------------+
| Specimen Description | SPUTUM                   |           | [facility]   |
|                      |                          |           | LABORATORY   |
+----------------------+--------------------------+-----------+--------------+
| GRAM STAIN           | GREATER THAN 10 SEC/LPF  |           | [facility]   |
|                      |                          |           | LABORATORY   |
+----------------------+--------------------------+-----------+--------------+
| GRAM STAIN           | LESS THAN 10 WBCS/LPF    |           | [facility]   |
|                      |                          |           | LABORATORY   |
+----------------------+--------------------------+-----------+--------------+
| GRAM STAIN           | 3+                       |           | [facility]   |
|                      |                          |           | LABORATORY   |
+----------------------+--------------------------+-----------+--------------+
| GRAM STAIN           | GRAM POSITIVE COCCI      |           | [facility]   |
|                      |                          |           | LABORATORY   |
+----------------------+--------------------------+-----------+--------------+
| CULTURE              | SMEAR CONTAINS GREATER   |           | [facility]   |
|                      | THAN 10 SEC/LPF          |           | LABORATORY   |
|                      | SUGGESTIVE OF POOR       |           |              |
|                      | QUALITY SPECIMEN.        |           |              |
|                      | SPECIMEN WILL NOT BE     |           |              |
|                      | CULTURED OR WILL BE      |           |              |
|                      | CULTURED BY SPECIAL      |           |              |
|                      | REQUEST ONLY.  PLEASE    |           |              |
|                      | RECOLLECT IF CLINICALLY  |           |              |
|                      | INDICATED.  SPECIMEN     |           |              |
|                      | WILL BE HELD 48 HOURS.   |           |              |
+----------------------+--------------------------+-----------+--------------+
 
 
 
+-----------------+
| Specimen        |
+-----------------+
| Sputum - Sputum |
+-----------------+
 
 
 
+---------------+-------------------------+---------------------+----------------+
| Performing    | Address                 | City/State/Zipcode  | Phone Number   |
| Organization  |                         |                     |                |
+---------------+-------------------------+---------------------+----------------+
|   [facility]  |   [address]  | [place] [address] |   [phone] |
 
| LABORATORY    | [address].                   |                     |                |
+---------------+-------------------------+---------------------+----------------+
 Comprehensive Metabolic Panel (2018  4:47 AM)
 
+----------------+--------------------------+-------------------+--------------+
| Component      | Value                    | Ref Range         | Performed At |
+----------------+--------------------------+-------------------+--------------+
| SODIUM         | 143                      | 135 - 145 mmol/L  | [facility]   |
|                |                          |                   | LABORATORY   |
+----------------+--------------------------+-------------------+--------------+
| POTASSIUM      | 3.7                      | 3.5 - 4.9 mmol/L  | [facility]   |
|                |                          |                   | LABORATORY   |
+----------------+--------------------------+-------------------+--------------+
| CHLORIDE       | 108                      | 99 - 109 mmol/L   | [facility]   |
|                |                          |                   | LABORATORY   |
+----------------+--------------------------+-------------------+--------------+
| CO2            | 28                       | 23 - 32 mmol/L    | [facility]   |
|                |                          |                   | LABORATORY   |
+----------------+--------------------------+-------------------+--------------+
| ANION GAP AGAP | 11                       | 5 - 20 mmol/L     | [facility]   |
|                |                          |                   | LABORATORY   |
+----------------+--------------------------+-------------------+--------------+
| GLUCOSE        | 153 (H)                  | 65 - 99 mg/dL     | [facility]   |
|                |                          |                   | LABORATORY   |
+----------------+--------------------------+-------------------+--------------+
| BUN            | 15                       | 8 - 25 mg/dL      | [facility]   |
|                |                          |                   | LABORATORY   |
+----------------+--------------------------+-------------------+--------------+
| CREATININE     | 0.9                      | 0.70 - 1.30 mg/dL | [facility]   |
|                |                          |                   | LABORATORY   |
+----------------+--------------------------+-------------------+--------------+
| BUN/CREAT      | 17                       |                   | [facility]   |
|                |                          |                   | LABORATORY   |
+----------------+--------------------------+-------------------+--------------+
| CALCIUM        | 8.5                      | 8.5 - 10.5 mg/dL  | [facility]   |
|                |                          |                   | LABORATORY   |
+----------------+--------------------------+-------------------+--------------+
| TOTAL PROTEIN  | 6.7                      | 6.3 - 8.2 g/dL    | [facility]   |
|                |                          |                   | LABORATORY   |
+----------------+--------------------------+-------------------+--------------+
| Albumin        | 3.1 (L)                  | 3.3 - 4.8 g/dL    | [facility]   |
|                |                          |                   | LABORATORY   |
+----------------+--------------------------+-------------------+--------------+
| GLOBULIN       | 3.6                      | 1.3 - 4.9 g/dL    | [facility]   |
|                |                          |                   | LABORATORY   |
+----------------+--------------------------+-------------------+--------------+
| A/G            | 0.9 (L)                  | 1.0 - 2.4         | [facility]   |
|                |                          |                   | LABORATORY   |
+----------------+--------------------------+-------------------+--------------+
| TBIL           | 0.4                      | 0.1 - 1.5 mg/dL   | [facility]   |
|                |                          |                   | LABORATORY   |
+----------------+--------------------------+-------------------+--------------+
| ALK PHOS       | 69                       | 35 - 115 U/L      | [facility]   |
|                |                          |                   | LABORATORY   |
+----------------+--------------------------+-------------------+--------------+
| AST            | 14                       | 10 - 45 U/L       | [facility]   |
|                |                          |                   | LABORATORY   |
+----------------+--------------------------+-------------------+--------------+
| ALT            | 23                       | 10 - 65 U/L       | [facility]   |
|                |                          |                   | LABORATORY   |
 
+----------------+--------------------------+-------------------+--------------+
| EGFR           | >60Comment: GFR <60:     | >60 mL/min/1.73m2 | [facility]   |
|                | CHRONIC KIDNEY DISEASE,  |                   | LABORATORY   |
|                | IF FOUND OVER A 3 MONTH  |                   |              |
|                | PERIOD.GFR <15: KIDNEY   |                   |              |
|                | FAILURE.FOR       |                   |              |
|                | AMERICANS, MULTIPLY THE  |                   |              |
|                | CALCULATED GFR BY        |                   |              |
|                | 1.210.This eGFR is       |                   |              |
|                | calculated using the     |                   |              |
|                | MDRD IDMS traceable      |                   |              |
|                | equation.Testing         |                   |              |
|                | performed at [facility], [address] |                   |              |
|                |  [facility],        |                   |              |
|                | [place]    [address]   |                   |              |
+----------------+--------------------------+-------------------+--------------+
 
 
 
+----------+
| Specimen |
+----------+
| Blood    |
+----------+
 
 
 
+---------------+-------------------------+---------------------+----------------+
| Performing    | Address                 | City/State/Zipcode  | Phone Number   |
| Organization  |                         |                     |                |
+---------------+-------------------------+---------------------+----------------+
|   [facility]  |   [address]  | [place] [address] |   [phone] |
| LABORATORY    | [address]                   |                     |                |
+---------------+-------------------------+---------------------+----------------+
 CBC W/Auto Diff (Reflex to Manual) (2018  4:47 AM)
 
+----------------------+-------------------------+-------------------+--------------+
| Component            | Value                   | Ref Range         | Performed At |
+----------------------+-------------------------+-------------------+--------------+
| WBC                  | 10.93                   | 3.80 - 11.00 K/uL | [facility]   |
|                      |                         |                   | LABORATORY   |
+----------------------+-------------------------+-------------------+--------------+
| RBC                  | 3.96 (L)                | 4.20 - 5.70 M/uL  | [facility]   |
|                      |                         |                   | LABORATORY   |
+----------------------+-------------------------+-------------------+--------------+
| HGB                  | 13.0 (L)                | 13.2 - 17.0 g/dL  | [facility]   |
|                      |                         |                   | LABORATORY   |
+----------------------+-------------------------+-------------------+--------------+
| HCT                  | 37.9 (L)                | 39.0 - 50.0 %     | [facility]   |
|                      |                         |                   | LABORATORY   |
+----------------------+-------------------------+-------------------+--------------+
| MCV                  | 95.6                    | 80.0 - 100.0 fl   | [facility]   |
|                      |                         |                   | LABORATORY   |
+----------------------+-------------------------+-------------------+--------------+
| MCH                  | 32.8                    | 27.0 - 34.0 pg    | [facility]   |
|                      |                         |                   | LABORATORY   |
+----------------------+-------------------------+-------------------+--------------+
| MCHC                 | 34.4                    | 32.0 - 35.5 g/dL  | [facility]   |
|                      |                         |                   | LABORATORY   |
+----------------------+-------------------------+-------------------+--------------+
 
| RDW SD               | 49.0                    | 37 - 53 fl        | [facility]   |
|                      |                         |                   | LABORATORY   |
+----------------------+-------------------------+-------------------+--------------+
| PLT                  | 180                     | 150 - 400 K/uL    | [facility]   |
|                      |                         |                   | LABORATORY   |
+----------------------+-------------------------+-------------------+--------------+
| MPV                  | 10.1                    | fl                | [facility]   |
|                      |                         |                   | LABORATORY   |
+----------------------+-------------------------+-------------------+--------------+
| DIFF TYPE            | MANUAL                  |                   | [facility]   |
|                      |                         |                   | LABORATORY   |
+----------------------+-------------------------+-------------------+--------------+
| Neutrophils Manual   | 91                      | %                 | [facility]   |
|                      |                         |                   | LABORATORY   |
+----------------------+-------------------------+-------------------+--------------+
| Bands                | 1                       | %                 | [facility]   |
|                      |                         |                   | LABORATORY   |
+----------------------+-------------------------+-------------------+--------------+
| Lymphocytes Manual   | 8                       | %                 | [facility]   |
|                      |                         |                   | LABORATORY   |
+----------------------+-------------------------+-------------------+--------------+
| Neutrophils Absolute | 9.95 (H)                | 1.90 - 7.40 K/uL  | [facility]   |
|                      |                         |                   | LABORATORY   |
+----------------------+-------------------------+-------------------+--------------+
| Bands Manual         | 0.11                    | 0.00 - 0.20 K/uL  | [facility]   |
|                      |                         |                   | LABORATORY   |
+----------------------+-------------------------+-------------------+--------------+
| Lymphocytes Absolute | 0.87 (L)                | 1.00 - 3.90 K/uL  | [facility]   |
|                      |                         |                   | LABORATORY   |
+----------------------+-------------------------+-------------------+--------------+
| MORPHOLOGY           | RBC AND PLT MORPHOLOGY  |                   | [facility]   |
|                      | APPEAR NORMALComment:   |                   | LABORATORY   |
|                      | Testing performed at    |                   |              |
|                      | [facility], [address]  |                   |              |
|                      | [facility], [place]     |                   |              |
|                      | [address]                   |                   |              |
+----------------------+-------------------------+-------------------+--------------+
 
 
 
+----------+
| Specimen |
+----------+
| Blood    |
+----------+
 
 
 
+---------------+-------------------------+---------------------+----------------+
| Performing    | Address                 | City/State/Zipcode  | Phone Number   |
| Organization  |                         |                     |                |
+---------------+-------------------------+---------------------+----------------+
|   [facility]  |   [address]  | [place] [address] |   [phone] |
| LABORATORY    | [address]vd[address]                   |                     |                |
+---------------+-------------------------+---------------------+----------------+
 Phosphorus (2018  4:47 AM)
 
+------------+--------------------------+-----------------+--------------+
| Component  | Value                    | Ref Range       | Performed At |
+------------+--------------------------+-----------------+--------------+
 
| PHOSPHORUS | 2.1 (L)Comment: Testing  | 2.3 - 4.8 mg/dL | [facility]   |
|            | performed at [facility], [address] |                 | LABORATORY   |
|            |  [facility],        |                 |              |
|            | [place]  [address]     |                 |              |
+------------+--------------------------+-----------------+--------------+
 
 
 
+----------+
| Specimen |
+----------+
| Blood    |
+----------+
 
 
 
+---------------+-------------------------+---------------------+----------------+
| Performing    | Address                 | City/State/Zipcode  | Phone Number   |
| Organization  |                         |                     |                |
+---------------+-------------------------+---------------------+----------------+
|   [facility]  |   [address]  | [place] [address] |   [phone] |
| LABORATORY    | [address]                   |                     |                |
+---------------+-------------------------+---------------------+----------------+
 Magnesium (2018  4:47 AM)
 
+-----------+--------------------------+-----------------+--------------+
| Component | Value                    | Ref Range       | Performed At |
+-----------+--------------------------+-----------------+--------------+
| MAGNESIUM | 2.3Comment: Testing      | 1.7 - 2.4 mg/dL | [facility]   |
|           | performed at [facility], [address] |                 | LABORATORY   |
|           |  [address],        |                 |              |
|           | [place]  [address]     |                 |              |
+-----------+--------------------------+-----------------+--------------+
 
 
 
+----------+
| Specimen |
+----------+
| Blood    |
+----------+
 
 
 
+---------------+-------------------------+---------------------+----------------+
| Performing    | Address                 | City/State/Zipcode  | Phone Number   |
| Organization  |                         |                     |                |
+---------------+-------------------------+---------------------+----------------+
|   [facility]  |   [address]  | [place] [address] |   [phone] |
| LABORATORY    | [address]                   |                     |                |
+---------------+-------------------------+---------------------+----------------+
 CTA Chest Pulmonary Embolism w IV con (2018 11:44 PM)
 
+------------------------------------------------------------------------+--------------+
| Impressions                                                            | Performed At |
+------------------------------------------------------------------------+--------------+
|      1. Some images are degraded due to motion artifact. No obvious    |   [facility]     |
| pulmonary emboli.   2. Emphysema with pulmonary vascular congestion    | RADIOLOGY    |
| and bilateral infiltrate or atelectasis, right greater than left.   3. |              |
|  Dilated ascending aorta measuring 4.4 cm. No aortic dissection.   4.  |              |
 
| Marked tracheomalacia.     [facility]      Electronically signed by Alex    |              |
| MD Clay on 2018 12:56AM Referring Provider Line:             |              |
| [phone] ID: [id number]                                               |              |
+------------------------------------------------------------------------+--------------+
 
 
 
+------------------------------------------------------------------------+--------------+
| Narrative                                                              | Performed At |
+------------------------------------------------------------------------+--------------+
|   EXAM:  CT ANGIOGRAM CHEST     EXAM DATE: 2018 11:45 PM.         |   [facility]     |
| CLINICAL HISTORY: Hypoxemia. Dyspnea. COPD.     COMPARISON:            | RADIOLOGY    |
| 2018.     TECHNIQUE: Routine helical imaging was performed       |              |
| through the chest in the pulmonary arterial phase. IV Contrast:        |              |
| Nonionic. Reconstructions: Coronal 3-D MIP reconstructions.Sagittal    |              |
| and coronal.     In accordance with CT protocol optimization, one or   |              |
| more of the following dose reduction techniques were utilized for this |              |
|  exam: automated exposure control, adjustment of mA and/or KV based on |              |
|  patient size, or use of iterative reconstructive technique.           |              |
| FINDINGS:   Pulmonary Arteries:   Diagnostic quality: Suboptimal       |              |
| through the segmental arteries. Pulmonary arteries are well enhanced   |              |
| but some images are degraded due to motion artifact. No obvious        |              |
| pulmonary emboli.      No evidence of right heart strain.              |              |
| Lungs/Pleura: Emphysema. Pulmonary vascular congestion. Bilateral      |              |
| infiltrate or atelectasis, right greater than left. No pleural         |              |
| effusion seen. No pneumothorax.     Mediastinum: Heart size normal to  |              |
| upper normal. No lymphadenopathy seen. Marked tracheomalacia.          |              |
| Thoracic Aorta: Ascending aorta measures 4.4 cm. Mild atherosclerosis. |              |
|  No aortic dissection.     Upper Abdomen: Possible fatty liver.        |              |
| Other: None.                                                           |              |
+------------------------------------------------------------------------+--------------+
 
 
 
+-------------------------------------------------------------------------------------------
------------------------+
| Procedure Note                                                                            
                        |
+-------------------------------------------------------------------------------------------
------------------------+
|   Edil, Rad Results In - 2018 12:56 AM PDT  EXAM:CT ANGIOGRAM CHESTEXAM DATE:        
                        |
| 2018 11:45 PM.CLINICAL HISTORY: Hypoxemia. Dyspnea. COPD.COMPARISON:                 
                        |
| 2018.TECHNIQUE: Routine helical imaging was performed through the chest in the      
                        |
| pulmonary arterial phase. IV Contrast: Nonionic. Reconstructions: Coronal 3-D MIP         
                        |
| reconstructions.Sagittal and coronal.In accordance with CT protocol optimization, one or  
                        |
|  more of the following dose reduction techniques were utilized for this exam: automated   
                        |
| exposure control, adjustment of mA and/or KV based on patient size, or use of iterative   
                        |
| reconstructive technique.FINDINGS: Pulmonary Arteries: Diagnostic quality: Suboptimal     
                        |
| through the segmental arteries. Pulmonary arteries are well enhanced but some images are  
                        |
|  degraded due to motion artifact. No obvious pulmonary emboli. No evidence of right       
                        |
 
| heart strain.Lungs/Pleura: Emphysema. Pulmonary vascular congestion. Bilateral            
                        |
| infiltrate or atelectasis, right greater than left. No pleural effusion seen. No          
                        |
| pneumothorax.Mediastinum: Heart size normal to upper normal. No lymphadenopathy seen.     
                        |
| Marked tracheomalacia. Thoracic Aorta: Ascending aorta measures 4.4 cm. Mild              
                        |
| atherosclerosis. No aortic dissection.Upper Abdomen: Possible fatty liver.Other:          
                        |
| None.IMPRESSION:1. Some images are degraded due to motion artifact. No obvious pulmonary  
                        |
|  emboli. 2. Emphysema with pulmonary vascular congestion and bilateral infiltrate or      
                        |
| atelectasis, right greater than left. 3. Dilated ascending aorta measuring 4.4 cm. No     
                        |
| aortic dissection. 4. Marked tracheomalacia.[facility] Electronically signed by Alex Williamson   
                        |
| MD on 2018 12:56AM Referring Provider Line: [phone] ID: [id number]               
                        |
|Mediastinum: Heart size normal to upper normal. No lymphadenopathy seen. Marked tracheomala
adriane.                    |
|                                                                                           
                        |
|Thoracic Aorta: Ascending aorta measures 4.4 cm. Mild atherosclerosis. No aortic dissection
.                       |
|                                                                                           
                        |
|Upper Abdomen: Possible fatty liver.                                                       
                        |
|Other: None.                                                                               
                        |
|IMPRESSION:                                                                                
                       |
|1. Some images are degraded due to motion artifact. No obvious pulmonary emboli.           
                        |
|2. Emphysema with pulmonary vascular congestion and bilateral infiltrate or atelectasis, ri
ght greater than left.  |
|3. Dilated ascending aorta measuring 4.4 cm. No aortic dissection.                         
                        |
|4. Marked tracheomalacia.                                                                  
                        |
|[facility]                                                                                      
                       |
| Electronically signed by Alex Williamson MD on 2018 12:56AM Referring Provider Line: [id number]
[id number] ID: [id number] |
+-------------------------------------------------------------------------------------------
------------------------+
 
 
 
 
+--------------------+------------------+--------------------+--------------+
| Performing         | Address          | City/State/Zipcode | Phone Number |
| Organization       |                  |                    |              |
+--------------------+------------------+--------------------+--------------+
|   [facility] |   [address] | [place] [address] |              |
+--------------------+------------------+--------------------+--------------+
 POC Arterial Blood Gas (2018 11:11 PM)
 
+------------------+--------------------------+------------------+-----------------+
| Component        | Value                    | Ref Range        | Performed At    |
+------------------+--------------------------+------------------+-----------------+
| pH, Art          | 7.341 (L)                | 7.350 - 7.450    | [facility] LABORATORY |
+------------------+--------------------------+------------------+-----------------+
| POC PCO2         | 42                       | 35 - 45 mmHg     | [facility] LABORATORY |
+------------------+--------------------------+------------------+-----------------+
| POC p02          | 93                       | 80 - 105 mmHg    | [facility] LAB[facility]AT[facility] |
+------------------+--------------------------+------------------+-----------------+
| POC HCO3         | 23                       | 22 - 26 mmol/L   | [facility] LABORATORY |
+------------------+--------------------------+------------------+-----------------+
| POC TCO2         | 24                       | 23 - 27 mEq/L    | [facility] LABORATORY |
+------------------+--------------------------+------------------+-----------------+
| POC BASE DEFICIT | 3 (H)                    | 0.0 - 2.0 mmol/L | [facility] |
+------------------+--------------------------+------------------+-----------------+
| POC S02          | 97                       | 95 - 98 %        | [facility] LABORAT[facility] |
+------------------+--------------------------+------------------+-----------------+
| POC COMMENTS     | Ariel Test not           |                  | [facility] LABORATORY |
|                  | indicatedComment: Site = |                  |                 |
|                  |  right radialTesting     |                  |                 |
|                  | performed at [facility]     |                  |                 |
|                  | [address];[place], [place]   |                  |                 |
|                  | [id number]                    |                  |                 |
+------------------+--------------------------+------------------+-----------------+
 
 
 
+-------------------+------------------+--------------------+--------------+
| Performing        | Address          | City/State/Zipcode | Phone Number |
| Organization      |                  |                    |              |
+-------------------+------------------+--------------------+--------------+
|   [facility] LAB[facility] |   [address] | [place] [address] |              |
+-------------------+------------------+--------------------+--------------+
 Respiratory Filmarray (2018 10:34 PM)
 
+----------------------+-------------------------+--------------+--------------+
| Component            | Value                   | Ref Range    | Performed At |
+----------------------+-------------------------+--------------+--------------+
| ADENOVIRUS           | Not Detected            | Not Detected | [facility]   |
|                      |                         |              | LABORATORY   |
+----------------------+-------------------------+--------------+--------------+
| CORONAVIRUS [id number]     | Not Detected            | Not Detected | [facility]   |
|                      |                         |              | LABORATORY   |
+----------------------+-------------------------+--------------+--------------+
| CORONAVIRUS HKU1     | Not Detected            | Not Detected | [facility]   |
|                      |                         |              | LABORATORY   |
+----------------------+-------------------------+--------------+--------------+
| CORONAVIRUS NL63     | Not Detected            | Not Detected | [facility]   |
|                      |                         |              | LABORATORY   |
 
+----------------------+-------------------------+--------------+--------------+
| CORONAVIRUS OC43     | Not Detected            | Not Detected | [facility]   |
|                      |                         |              | LABORATORY   |
+----------------------+-------------------------+--------------+--------------+
| HUMAN                | Not Detected            | Not Detected | [facility]   |
| [facility]      |                         |              | LABORATORY   |
+----------------------+-------------------------+--------------+--------------+
| HUMAN RHINO/ENTERO   | Not Detected            | Not Detected | [facility]   |
|                      |                         |              | LABORATORY   |
+----------------------+-------------------------+--------------+--------------+
| INFLUENZA A          | Not Detected            | Not Detected | [facility]   |
|                      |                         |              | LABORATORY   |
+----------------------+-------------------------+--------------+--------------+
| INFLUENZA B          | Not Detected            | Not Detected | [facility]   |
|                      |                         |              | LABORATORY   |
+----------------------+-------------------------+--------------+--------------+
| PARAINFLUENZA 1      | Not Detected            | Not Detected | [facility]   |
|                      |                         |              | LABORATORY   |
+----------------------+-------------------------+--------------+--------------+
| PARAINFLUENZA 2      | Not Detected            | Not Detected | [facility]   |
|                      |                         |              | LABORATORY   |
+----------------------+-------------------------+--------------+--------------+
| PARAINFLUENZA 3      | Not Detected            | Not Detected | [facility]   |
|                      |                         |              | LABORATORY   |
+----------------------+-------------------------+--------------+--------------+
| PARAINFLUENZA 4      | Not Detected            | Not Detected | [facility]   |
|                      |                         |              | LABORATORY   |
+----------------------+-------------------------+--------------+--------------+
| RESP SYNCYTIAL VIRUS | Not Detected            | Not Detected | [facility]   |
|                      |                         |              | LABORATORY   |
+----------------------+-------------------------+--------------+--------------+
| BORDETELLA PERTUSSIS | Not Detected            | Not Detected | [facility]   |
|                      |                         |              | LABORATORY   |
+----------------------+-------------------------+--------------+--------------+
| CHLAMYDIAE           | Not Detected            | Not Detected | [facility]   |
| P[facility]UM[facility]E           |                         |              | LABORATORY   |
+----------------------+-------------------------+--------------+--------------+
| MYCOPLASMA           | Not Detected            | Not Detected | [facility]   |
| P[facility]UMON[facility]E           |                         |              | LABORATORY   |
+----------------------+-------------------------+--------------+--------------+
| RESP PANEL INTERP    | Testing performed by    |              | [facility]   |
|                      | Molecular               |              | LABORATORY   |
|                      | MethodologyComment:     |              |              |
|                      | Testing performed at    |              |              |
|                      | [facility], [address] [place] [address]  |              |              |
|                      | [place]     |              |              |
|                      | [id number]                   |              |              |
+----------------------+-------------------------+--------------+--------------+
 
 
 
+-----------------+
| Specimen        |
+-----------------+
| Nasopharyngeal  |
| Culture         |
+-----------------+
 
 
 
 
+---------------+-------------------------+---------------------+----------------+
| Performing    | Address                 | City/State/Zipcode  | Phone Number   |
| Organization  |                         |                     |                |
+---------------+-------------------------+---------------------+----------------+
|   [facility]  |   [address]  | [place] [address] |   [phone] |
| LABORATORY    | [address]                   |                     |                |
+---------------+-------------------------+---------------------+----------------+
 US Lower extremity venous doppler bilateral (2018  9:50 PM)
 
+--------------------------------------------------------------------+--------------+
| Impressions                                                        | Performed At |
+--------------------------------------------------------------------+--------------+
|   1.    No evidence of lower extremity deep vein thrombosis.       |   [facility]     |
| Electronically signed by Mega Tinoco MD on 2018 9:48 PM | RADIOLOGY    |
+--------------------------------------------------------------------+--------------+
 
 
 
+------------------------------------------------------------------------+--------------+
| Narrative                                                              | Performed At |
+------------------------------------------------------------------------+--------------+
|   [facility]   LOWER EXTREMITY VENOUS DOPPLER BILAT        |   [facility]     |
| 2018 9:16 PM     HISTORY:  68 years.    Male.    Swelling of the  | RADIOLOGY    |
| left and right lower extremities.     TECHNIQUE:  Bilateral lower      |              |
| extremity venous exam using grayscale, color Doppler and pulsed wave   |              |
| spectral Doppler techniques.     COMPARISON:  None.     FINDINGS:      |              |
| Normal compressibility, augmentation of flow, and Doppler flow evident |              |
|  within the deep venous system. No evidence of deep venous thrombosis. |              |
|                                                                        |              |
+------------------------------------------------------------------------+--------------+
 
 
 
+-------------------------------------------------------------------------------------------
--------------------------------------------------+
| Procedure Note                                                                            
                                                  |
+-------------------------------------------------------------------------------------------
--------------------------------------------------+
|   Edil, Agustin Results In - 2018  9:53 PM PDT  [facility] LOWER EXTREMITY   
                                                  |
| VENOUS DOPPLER BILAT2018 9:16 PMHISTORY:68 years.  Male.  Swelling of the left and   
                                                  |
| right lower extremities.TECHNIQUE:Bilateral lower extremity venous exam using grayscale,  
                                                  |
|  color Doppler and pulsed wave spectral Doppler                                           
                                                  |
| techniques.COMPARISON:None.FINDINGS:Normal compressibility, augmentation of flow, and     
                                                  |
| Doppler flow evident within the deep venous system. No evidence of deep venous            
                                                  |
| thrombosis.IMPRESSION:1.  No evidence of lower extremity deep vein                        
                                                  |
| thrombosis.Electronically signed by Mega Tinoco MD on 2018 9:48 PM             
                                                  |
|Bilateral lower extremity venous exam using grayscale, color Doppler and pulsed wave spectr
al Doppler techniques.                            |
|                                                                                           
                                                  |
|COMPARISON:                                                                                
 
                                                 |
|None.                                                                                      
                                                 |
|FINDINGS:                                                                                  
                                                 |
|Normal compressibility, augmentation of flow, and Doppler flow evident within the deep veno
us system. No evidence of deep venous thrombosis. |
|                                                                                           
                                                  |
|IMPRESSION:                                                                                
                                                 |
|1.  No evidence of lower extremity deep vein thrombosis.                                   
                                                  |
|Electronically signed by Mega Tinoco MD on 2018 9:48 PM                         
                                                  |
+-------------------------------------------------------------------------------------------
--------------------------------------------------+
 
 
 
+--------------------+------------------+--------------------+--------------+
| Performing         | Address          | City/State/Zipcode | Phone Number |
| Organization       |                  |                    |              |
+--------------------+------------------+--------------------+--------------+
|   [facility] |   [address] | [place] [address] |              |
+--------------------+------------------+--------------------+--------------+
 aPTT (2018  8:09 PM)
 
+-----------+-------------------------+-----------------+-----------------+
| Component | Value                   | Ref Range       | Performed At    |
+-----------+-------------------------+-----------------+-----------------+
| APTT      | 31Comment: Testing      | 23 - 32 seconds | [facility] |
|           | performed at [facility];[address]    |                 |                 |
|           | [address];[place]  |                 |                 |
|           | [id number]                   |                 |                 |
+-----------+-------------------------+-----------------+-----------------+
 
 
 
+-------------------+------------------+--------------------+--------------+
| Performing        | Address          | City/State/Zipcode | Phone Number |
| Organization      |                  |                    |              |
+-------------------+------------------+--------------------+--------------+
|   [facility] |   [address] | [place] [address] |              |
+-------------------+------------------+--------------------+--------------+
 Protime-INR (2018  8:09 PM)
 
+-----------+--------------------------+-----------+-----------------+
| Component | Value                    | Ref Range | Performed At    |
+-----------+--------------------------+-----------+-----------------+
| INR       | 1.1Comment: REFERENCE    |           | [facility] LAB[facility] |
|           | RANGE:0.9 -              |           |                 |
|           | 1.2    NON-ANTICOAGULATE |           |                 |
|           | D2.0 - 3.0    ALL OTHER  |           |                 |
|           | THERAPEUTIC              |           |                 |
|           | INDICATIONS2.5 - 3.5     |           |                 |
 
|           | MECHANICAL HEART VALVES, |           |                 |
|           |  RECURRENT OR SYSTEMIC   |           |                 |
|           | EMBOLISMTesting          |           |                 |
|           | performed at [facility];[address]     |           |                 |
|           | [address];[place]   |           |                 |
|           | [id number]                    |           |                 |
+-----------+--------------------------+-----------+-----------------+
 
 
 
+-------------------+------------------+--------------------+--------------+
| Performing        | Address          | City/State/Zipcode | Phone Number |
| Organization      |                  |                    |              |
+-------------------+------------------+--------------------+--------------+
|   [facility] |   [address] | [place] [address] |              |
+-------------------+------------------+--------------------+--------------+
 Septic Lactic Acid (2018  8:09 PM)
 
+-------------+-------------------------+------------------+-----------------+
| Component   | Value                   | Ref Range        | Performed At    |
+-------------+-------------------------+------------------+-----------------+
| LACTIC ACID | 1.0Comment: Testing     | 0.4 - 2.0 mmol/L | [facility] |
|             | performed at [facility];[address]    |                  |                 |
|             | [address];[place]  |                  |                 |
|             | [id number]                   |                  |                 |
+-------------+-------------------------+------------------+-----------------+
 
 
 
+-------------------+------------------+--------------------+--------------+
| Performing        | Address          | City/State/Zipcode | Phone Number |
| Organization      |                  |                    |              |
+-------------------+------------------+--------------------+--------------+
|   [facility] |   [address] | [place] [address] |              |
+-------------------+------------------+--------------------+--------------+
 D dimer,quant (2018  8:09 PM)
 
+--------------+--------------------------+----------------------+-----------------+
| Component    | Value                    | Ref Range            | Performed At    |
+--------------+--------------------------+----------------------+-----------------+
| D DIMER,     | 0.59 (H)Comment: D Dimer | 0.19 - 0.50 mg/L FEU | [facility] LAB[facility] |
| QUANTITATIVE |  results less than 0.50  |                      |                 |
|              | mg/L FEU may rule out    |                      |                 |
|              | DVT and PE.    However,  |                      |                 |
|              | all laboratory results   |                      |                 |
|              | should be interpreted in |                      |                 |
|              |  the context of all      |                      |                 |
|              | available clinical,      |                      |                 |
|              | radiologic and           |                      |                 |
|              | laboratory               |                      |                 |
|              | information.Testing      |                      |                 |
|              | performed at [facility];[address]     |                      |                 |
|              | [facility];[place], [place]   |                      |                 |
|              | [id number]                    |                      |                 |
+--------------+--------------------------+----------------------+-----------------+
 
 
 
+----------+
| Specimen |
 
+----------+
| Blood    |
+----------+
 
 
 
+-------------------+------------------+--------------------+--------------+
| Performing        | Address          | City/State/Zipcode | Phone Number |
| Organization      |                  |                    |              |
+-------------------+------------------+--------------------+--------------+
|   [facility] |   [address] | [place] [address] |              |
+-------------------+------------------+--------------------+--------------+
 PROCALCITONIN (2018  8:09 PM)
 
+---------------+--------------------------+------------+-----------------+
| Component     | Value                    | Ref Range  | Performed At    |
+---------------+--------------------------+------------+-----------------+
| PROCALCITONIN | 0.93 (H)Comment:         | <0.5 ng/mL | [facility] LAB[facility] |
|               | INTERPRETIVE             |            |                 |
|               | INFORMATION:    PROCALCI |            |                 |
|               | TONIN PCT <= 0.5         |            |                 |
|               | ng/mL:        Low risk   |            |                 |
|               | for progression to       |            |                 |
|               | severe                   |            |                 |
|               | systemic        bacteria |            |                 |
|               | l infection (severe      |            |                 |
|               | sepsis/septic            |            |                 |
|               | shock).        Does not  |            |                 |
|               | exclude an infection,    |            |                 |
|               | because                  |            |                 |
|               | localized        infecti |            |                 |
|               | ons may be associated    |            |                 |
|               | with such low            |            |                 |
|               | levels.        If PCT is |            |                 |
|               |  measured very early     |            |                 |
|               | after                    |            |                 |
|               | bacterial        challen |            |                 |
|               | ge (usually <6 hours),   |            |                 |
|               | results may still        |            |                 |
|               | be        low and should |            |                 |
|               |  re-assess PCT 6-24      |            |                 |
|               | hours later. PCT >0.5    |            |                 |
|               | and <= 2                 |            |                 |
|               | ng/mL:        Moderate   |            |                 |
|               | risk for progression to  |            |                 |
|               | severe                   |            |                 |
|               | systemic        infectio |            |                 |
|               | n (severe sepsis/septic  |            |                 |
|               | shock).    Other         |            |                 |
|               | conditions are known to  |            |                 |
|               | elevate PCT, patient     |            |                 |
|               |         should be        |            |                 |
|               | closely monitored both   |            |                 |
|               | clinically and           |            |                 |
|               | by        re-assessing   |            |                 |
|               | PCT within 6-24 hours.   |            |                 |
|               | PCT > 2                  |            |                 |
|               | ng/mL:        High       |            |                 |
|               | likelihood for           |            |                 |
|               | progression to severe    |            |                 |
 
|               | systemic        bacteria |            |                 |
|               | l infection (severe      |            |                 |
|               | sepsis/septic shock).    |            |                 |
|               | PCT >= 10                |            |                 |
|               | ng/mL:        High       |            |                 |
|               | likelihood of severe     |            |                 |
|               | sepsis or septic         |            |                 |
|               | shock.Testing performed  |            |                 |
|               | at [facility];[address];[place] [address]   |            |                 |
+---------------+--------------------------+------------+-----------------+
 
 
 
+-------------------+------------------+--------------------+--------------+
| Performing        | Address          | City/State/Zipcode | Phone Number |
| Organization      |                  |                    |              |
+-------------------+------------------+--------------------+--------------+
|   [facility] |   [address] | [place] [address] |              |
+-------------------+------------------+--------------------+--------------+
 Comprehensive metabolic panel (2018  8:09 PM)
 
+----------------+--------------------------+-------------------+-----------------+
| Component      | Value                    | Ref Range         | Performed At    |
+----------------+--------------------------+-------------------+-----------------+
| SODIUM         | 140                      | 135 - 145 mmol/L  | [facility] |
+----------------+--------------------------+-------------------+-----------------+
| POTASSIUM      | 3.7                      | 3.5 - 4.9 mmol/L  | [facility] |
+----------------+--------------------------+-------------------+-----------------+
| CHLORIDE       | 107                      | 99 - 109 mmol/L   | [facility] |
+----------------+--------------------------+-------------------+-----------------+
| CO2            | 26                       | 23 - 32 mmol/L    | [facility] |
+----------------+--------------------------+-------------------+-----------------+
| ANION GAP AGAP | 10                       | 5 - 20 mmol/L     | [facility] |
+----------------+--------------------------+-------------------+-----------------+
| GLUCOSE        | 145 (H)                  | 65 - 99 mg/dL     | [facility] |
+----------------+--------------------------+-------------------+-----------------+
| BUN            | 14                       | 8 - 25 mg/dL      | [facility] |
+----------------+--------------------------+-------------------+-----------------+
| CREATININE     | 0.86                     | 0.70 - 1.30 mg/dL | [facility] |
+----------------+--------------------------+-------------------+-----------------+
| BUN/CREAT      | 16                       |                   | [facility] |
+----------------+--------------------------+-------------------+-----------------+
| CALCIUM        | 8.8                      | 8.5 - 10.5 mg/dL  | [facility] |
+----------------+--------------------------+-------------------+-----------------+
| TOTAL PROTEIN  | 7.3                      | 6.3 - 8.2 g/dL    | [facility] |
+----------------+--------------------------+-------------------+-----------------+
| Albumin        | 3.4                      | 3.3 - 4.8 g/dL    | [facility] |
+----------------+--------------------------+-------------------+-----------------+
| GLOBULIN       | 3.8                      | 1.3 - 4.9 g/dL    | [facility] |
+----------------+--------------------------+-------------------+-----------------+
| A/G            | 0.9 (L)                  | 1.0 - 2.4         | [facility] |
+----------------+--------------------------+-------------------+-----------------+
| TBIL           | 0.4                      | 0.1 - 1.5 mg/dL   | [facility] |
+----------------+--------------------------+-------------------+-----------------+
| ALK PHOS       | 78                       | 35 - 115 U/L      | [facility] |
+----------------+--------------------------+-------------------+-----------------+
| AST            | 15                       | 10 - 45 U/L       | [facility] |
+----------------+--------------------------+-------------------+-----------------+
| ALT            | 27                       | 10 - 65 U/L       | [facility] |
 
+----------------+--------------------------+-------------------+-----------------+
| EGFR           | >60Comment: GFR <60:     | >60 mL/min/1.73m2 | [facility] |
|                | CHRONIC KIDNEY DISEASE,  |                   |                 |
|                | IF FOUND OVER A 3 MONTH  |                   |                 |
|                | PERIOD.GFR <15: KIDNEY   |                   |                 |
|                | FAILURE.FOR       |                   |                 |
|                | [employer]IC[employer], MULTIPLY THE  |                   |                 |
|                | CALCULATED GFR BY        |                   |                 |
|                | 1.210.This eGFR is       |                   |                 |
|                | calculated using the     |                   |                 |
|                | MDRD IDMS traceable      |                   |                 |
|                | equation.Testing         |                   |                 |
|                | performed at [facility]     |                   |                 |
|                | [facility];[place]   |                   |                 |
|                | [id number]                    |                   |                 |
+----------------+--------------------------+-------------------+-----------------+
 
 
 
+-------------------+------------------+--------------------+--------------+
| Performing        | Address          | City/State/Zipcode | Phone Number |
| Organization      |                  |                    |              |
+-------------------+------------------+--------------------+--------------+
|   [facility] |   [address] | [place] [address] |              |
+-------------------+------------------+--------------------+--------------+
 Troponin I (2018  8:09 PM)
 
+------------+--------------------------+-------------------+-----------------+
| Component  | Value                    | Ref Range         | Performed At    |
+------------+--------------------------+-------------------+-----------------+
| TROPONIN I | <0.020Comment:   0.00 to | 0.00 - 0.10 ng/mL | [facility] |
|            |  0.10     CONSISTENT     |                   |                 |
|            | WITH NORMAL              |                   |                 |
|            | POPULATION0.11 to        |                   |                 |
|            | 0.60     CONSISTENT WITH |                   |                 |
|            |  INCREASED RISK FOR      |                   |                 |
|            | ADVERSE OUTCOMES>        |                   |                 |
|            | 0.60                     |                   |                 |
|            | CONSISTENT WITH WHO      |                   |                 |
|            | CRITERIA FOR ACUTE MI    |                   |                 |
|            | Testing performed at     |                   |                 |
|            | [facility];[address] [facility]            |                   |                 |
|            | [facility];[place] [id number]   |                   |                 |
+------------+--------------------------+-------------------+-----------------+
 
 
 
+-------------------+------------------+--------------------+--------------+
| Performing        | Address          | City/State/Zipcode | Phone Number |
| Organization      |                  |                    |              |
+-------------------+------------------+--------------------+--------------+
|   [facility] |   [address] | [place] [address] |              |
+-------------------+------------------+--------------------+--------------+
 CK MB (2018  8:09 PM)
 
+-------------+------------------------+-----------------+-----------------+
| Component   | Value                  | Ref Range       | Performed At    |
+-------------+------------------------+-----------------+-----------------+
| MMB         | <1.0                   | 0.5 - 3.6 ng/mL | [facility] |
+-------------+------------------------+-----------------+-----------------+
 
| CK-MB Index | UNABLE TO              |                 | [facility] |
|             | CALCULATEComment:      |                 |                 |
|             | Testing performed at   |                 |                 |
|             | [facility]          |                 |                 |
|             | [address];[place] [id number] |                 |                 |
+-------------+------------------------+-----------------+-----------------+
 
 
 
+-------------------+------------------+--------------------+--------------+
| Performing        | Address          | City/State/Zipcode | Phone Number |
| Organization      |                  |                    |              |
+-------------------+------------------+--------------------+--------------+
|   [facility] |   [address] | [place] [address] |              |
+-------------------+------------------+--------------------+--------------+
 CPK (2018  8:09 PM)
 
+-----------+-------------------------+--------------+-----------------+
| Component | Value                   | Ref Range    | Performed At    |
+-----------+-------------------------+--------------+-----------------+
| CPK       | 51 (L)Comment: Testing  | 55 - 400 U/L | [facility] |
|           | performed at [facility];[address]    |              |                 |
|           | [address];[place]  |              |                 |
|           | [id number]                   |              |                 |
+-----------+-------------------------+--------------+-----------------+
 
 
 
+-------------------+------------------+--------------------+--------------+
| Performing        | Address          | City/State/Zipcode | Phone Number |
| Organization      |                  |                    |              |
+-------------------+------------------+--------------------+--------------+
|   [facility] |   [address] | [place] [address] |              |
+-------------------+------------------+--------------------+--------------+
 POC Arterial Blood Gas (2018  7:41 PM)
 
+-----------------+-------------------------+----------------+-----------------+
| Component       | Value                   | Ref Range      | Performed At    |
+-----------------+-------------------------+----------------+-----------------+
| POC FIO2        | 28                      | %              | [facility] |
+-----------------+-------------------------+----------------+-----------------+
| pH, Art         | 7.402                   | 7.350 - 7.450  | [facility] |
+-----------------+-------------------------+----------------+-----------------+
| POC PCO2        | 40                      | 35 - 45 mmHg   | [facility] |
+-----------------+-------------------------+----------------+-----------------+
| POC p02         | 66 (L)                  | 80 - 105 mmHg  | [facility] |
+-----------------+-------------------------+----------------+-----------------+
| POC HCO3        | 25                      | 22 - 26 mmol/L | [facility] |
+-----------------+-------------------------+----------------+-----------------+
| POC TCO2        | 26                      | 23 - 27 mEq/L  | [facility] |
+-----------------+-------------------------+----------------+-----------------+
| POC BASE EXCESS | 0                       | 0 - 3 mEq/L    | [facility] |
+-----------------+-------------------------+----------------+-----------------+
| POC S02         | 93 (L)Comment: Testing  | 95 - 98 %      | [facility] |
|                 | performed at [facility];[address]    |                |                 |
|                 | [address];[place]  |                |                 |
|                 | [id number]                   |                |                 |
+-----------------+-------------------------+----------------+-----------------+
 
 
 
 
+-------------------+------------------+--------------------+--------------+
| Performing        | Address          | City/State/Zipcode | Phone Number |
| Organization      |                  |                    |              |
+-------------------+------------------+--------------------+--------------+
|   [facility] |   [address] | [place] [address] |              |
+-------------------+------------------+--------------------+--------------+
 XR chest PA and lateral (2018  7:04 PM)
 
+------------------------------------------------------------------------+--------------+
| Impressions                                                            | Performed At |
+------------------------------------------------------------------------+--------------+
|   Bilateral pulmonary vascular congestion. Bibasilar atelectasis.      |   [facility]     |
| Electronically signed by Power Blandon MD on 2018 7:10 PM           | RADIOLOGY    |
+------------------------------------------------------------------------+--------------+
 
 
 
+------------------------------------------------------------------------+--------------+
| Narrative                                                              | Performed At |
+------------------------------------------------------------------------+--------------+
|   [place]  1950  68 years Male  XR CHEST 2 VIEW      |   [facility]     |
| FRONTAL AND LATERAL  2018 7:04 PM     INDICATION: Shortness of    | RADIOLOGY    |
| breath     COMPARISON: 2018     TECHNIQUE: Two view chest, PA |              |
|  and lateral views     FINDINGS:  Bilateral pulmonary vascular         |              |
| congestion. Bibasilar mild atelectasis.     No pneumothorax.     Upper |              |
|  mediastinal contours and heart size are normal.     No acute osseous  |              |
| abnormality.                                                           |              |
+------------------------------------------------------------------------+--------------+
 
 
 
+-----------------------------------------------------------------------+
| Procedure Note                                                        |
+-----------------------------------------------------------------------+
|   Edil, Rad Results In - 2018  7:15 PM PDT  [facility] |
| 1950                                                             |
| 68 years Male                                                         |
| XR CHEST 2 VIEW FRONTAL AND LATERAL                                   |
| 2018 7:04 PM                                                     |
|                                                                       |
| INDICATION: Shortness of breath                                       |
|                                                                       |
| COMPARISON: 2018                                             |
|                                                                       |
| TECHNIQUE: Two view chest, PA and lateral views                       |
|                                                                       |
| FINDINGS:                                                             |
| Bilateral pulmonary vascular congestion. Bibasilar mild atelectasis.  |
|                                                                       |
| No pneumothorax.                                                      |
|                                                                       |
| Upper mediastinal contours and heart size are normal.                 |
|                                                                       |
| No acute osseous abnormality.                                         |
|                                                                       |
| IMPRESSION:                                                           |
| Bilateral pulmonary vascular congestion. Bibasilar atelectasis.       |
|                                                                       |
| Electronically signed by Power Blandon MD on 2018 7:10 PM          |
 
+-----------------------------------------------------------------------+
 
 
 
+--------------------+------------------+--------------------+--------------+
| Performing         | Address          | City/State/Zipcode | Phone Number |
| Organization       |                  |                    |              |
+--------------------+------------------+--------------------+--------------+
|   [facility] |   [address] | [place] [address] |              |
+--------------------+------------------+--------------------+--------------+
 aPTT (2018  6:00 PM)
 
+-----------+-------------------------+-----------------+-----------------+
| Component | Value                   | Ref Range       | Performed At    |
+-----------+-------------------------+-----------------+-----------------+
| APTT      | 29Comment: Testing      | 23 - 32 seconds | [facility] |
|           | performed at [facility];[address]    |                 |                 |
|           | [address];[place]  |                 |                 |
|           | [id number]                   |                 |                 |
+-----------+-------------------------+-----------------+-----------------+
 
 
 
+-------------------+------------------+--------------------+--------------+
| Performing        | Address          | City/State/Zipcode | Phone Number |
| Organization      |                  |                    |              |
+-------------------+------------------+--------------------+--------------+
|   [facility] |   [address] | [place] [address] |              |
+-------------------+------------------+--------------------+--------------+
 Protime-INR (2018  6:00 PM)
 
+-----------+--------------------------+-----------+-----------------+
| Component | Value                    | Ref Range | Performed At    |
+-----------+--------------------------+-----------+-----------------+
| INR       | 1.1Comment: REFERENCE    |           | [facility] |
|           | RANGE:0.9 -              |           |                 |
|           | 1.2    NON-ANTICOAGULATE |           |                 |
|           | D2.0 - 3.0    ALL OTHER  |           |                 |
|           | THERAPEUTIC              |           |                 |
|           | INDICATIONS2.5 - 3.5     |           |                 |
|           | MECHANICAL HEART VALVES, |           |                 |
|           |  RECURRENT OR SYSTEMIC   |           |                 |
|           | EMBOLISMTesting          |           |                 |
|           | performed at [facility];[address]     |           |                 |
|           | [address];[place], [place]   |           |                 |
|           | [id number]                    |           |                 |
+-----------+--------------------------+-----------+-----------------+
 
 
 
+-------------------+------------------+--------------------+--------------+
| Performing        | Address          | City/State/Zipcode | Phone Number |
| Organization      |                  |                    |              |
+-------------------+------------------+--------------------+--------------+
|   [facility] |   [address] | [place] [address] |              |
+-------------------+------------------+--------------------+--------------+
 BNP (2018  5:40 PM)
 
+--------------------+-------------------------+---------------+-----------------+
| Component          | Value                   | Ref Range     | Performed At    |
 
+--------------------+-------------------------+---------------+-----------------+
| BRAIN NATRIURETIC  | 32.9Comment: Testing    | 0 - 100 pg/mL | [facility] |
| PEPTIDE            | performed at [facility];[address]    |               |                 |
|                    | [address];[place]  |               |                 |
|                    | [id number]                   |               |                 |
+--------------------+-------------------------+---------------+-----------------+
 
 
 
+----------+
| Specimen |
+----------+
| Blood    |
+----------+
 
 
 
+-------------------+------------------+--------------------+--------------+
| Performing        | Address          | City/State/Zipcode | Phone Number |
| Organization      |                  |                    |              |
+-------------------+------------------+--------------------+--------------+
|   [facility] |   [address] | [place] [address] |              |
+-------------------+------------------+--------------------+--------------+
 Cardiac Panel (2018  5:40 PM)
 
+-----------------+--------------------------+-------------------+-----------------+
| Component       | Value                    | Ref Range         | Performed At    |
+-----------------+--------------------------+-------------------+-----------------+
| WBC             | 12.67 (H)                | 3.80 - 11.00 K/uL | [facility] |
+-----------------+--------------------------+-------------------+-----------------+
| RBC             | 4.21                     | 4.20 - 5.70 M/uL  | [facility] |
+-----------------+--------------------------+-------------------+-----------------+
| HGB             | 13.5                     | 13.2 - 17.0 g/dL  | [facility] |
+-----------------+--------------------------+-------------------+-----------------+
| HCT             | 40.7                     | 39.0 - 50.0 %     | [facility] |
+-----------------+--------------------------+-------------------+-----------------+
| MCV             | 96.6                     | 80.0 - 100.0 fl   | [facility] |
+-----------------+--------------------------+-------------------+-----------------+
| MCH             | 32.0                     | 27.0 - 34.0 pg    | [facility] |
+-----------------+--------------------------+-------------------+-----------------+
| MCHC            | 33.1                     | 32.0 - 35.5 g/dL  | [facility] |
+-----------------+--------------------------+-------------------+-----------------+
| RDW SD          | 48.6                     | 37 - 53 fl        | [facility] |
+-----------------+--------------------------+-------------------+-----------------+
| PLT             | 194                      | 150 - 400 K/uL    | [facility] |
+-----------------+--------------------------+-------------------+-----------------+
| MPV             | 9.4                      | fl                | [facility] |
+-----------------+--------------------------+-------------------+-----------------+
| DIFF TYPE       | AUTOMATED                |                   | [facility] |
+-----------------+--------------------------+-------------------+-----------------+
| NEUTROPHILS     | 78.22                    | %                 | [facility] |
+-----------------+--------------------------+-------------------+-----------------+
| LYMPHOCYTES     | 11.30                    | %                 | [facility] |
+-----------------+--------------------------+-------------------+-----------------+
| MONOCYTES       | 8.09                     | %                 | [facility] |
+-----------------+--------------------------+-------------------+-----------------+
| EOSINOPHILS     | 1.72                     | %                 | [facility] |
+-----------------+--------------------------+-------------------+-----------------+
| BASOPHILS       | 0.67                     | %                 | [facility] |
+-----------------+--------------------------+-------------------+-----------------+
 
| NEUTROPHILS ABS | 9.91 (H)                 | 1.90 - 7.40 K/uL  | [facility] |
+-----------------+--------------------------+-------------------+-----------------+
| LYMPHOCYTES ABS | 1.43                     | 1.00 - 3.90 K/uL  | [facility] |
+-----------------+--------------------------+-------------------+-----------------+
| MONOCYTES ABS   | 1.03 (H)                 | 0.00 - 0.80 K/uL  | [facility] |
+-----------------+--------------------------+-------------------+-----------------+
| EOSINOPHILS ABS | 0.22                     | 0.00 - 0.50 K/uL  | [facility] |
+-----------------+--------------------------+-------------------+-----------------+
| BASOPHILS ABS   | 0.09Comment: Testing     | 0.00 - 0.10 K/uL  | [facility] |
|                 | performed at [facility];[address]     |                   |                 |
|                 | [address];[place], [place]   |                   |                 |
|                 | [id number]                    |                   |                 |
+-----------------+--------------------------+-------------------+-----------------+
| SODIUM          | SPECIMEN HEMOLYZED, WILL | 135 - 145 mmol/L  | [facility] |
|                 |  BE REDRAWN RN TO SEND   |                   |                 |
+-----------------+--------------------------+-------------------+-----------------+
| POTASSIUM       | SPECIMEN HEMOLYZED, WILL | 3.5 - 4.9 mmol/L  | [facility] |
|                 |  BE REDRAWN RN TO SEND   |                   |                 |
+-----------------+--------------------------+-------------------+-----------------+
| CHLORIDE        | SPECIMEN HEMOLYZED, WILL | 99 - 109 mmol/L   | [facility] |
|                 |  BE REDRAWN RN TO SEND   |                   |                 |
+-----------------+--------------------------+-------------------+-----------------+
| CO2             | SPECIMEN HEMOLYZED, WILL | 23 - 32 mmol/L    | [facility] |
|                 |  BE REDRAWN RN TO SEND   |                   |                 |
+-----------------+--------------------------+-------------------+-----------------+
| ANION GAP AGAP  | SPECIMEN HEMOLYZED, WILL | 5 - 20 mmol/L     | [facility] |
|                 |  BE REDRAWN RN TO SEND   |                   |                 |
+-----------------+--------------------------+-------------------+-----------------+
| GLUCOSE         | SPECIMEN HEMOLYZED, WILL | 65 - 99 mg/dL     | [facility] |
|                 |  BE REDRAWN RN TO SEND   |                   |                 |
+-----------------+--------------------------+-------------------+-----------------+
| BUN             | SPECIMEN HEMOLYZED, WILL | 8 - 25 mg/dL      | [facility] |
|                 |  BE REDRAWN RN TO SEND   |                   |                 |
+-----------------+--------------------------+-------------------+-----------------+
| CREATININE      | SPECIMEN HEMOLYZED, WILL | 0.70 - 1.30 mg/dL | [facility] |
|                 |  BE REDRAWN RN TO SEND   |                   |                 |
+-----------------+--------------------------+-------------------+-----------------+
| BUN/CREAT       | SPECIMEN HEMOLYZED, WILL |                   | [facility] |
|                 |  BE REDRAWN RN TO SEND   |                   |                 |
+-----------------+--------------------------+-------------------+-----------------+
| CALCIUM         | SPECIMEN HEMOLYZED, WILL | 8.5 - 10.5 mg/dL  | [facility] |
|                 |  BE REDRAWN RN TO SEND   |                   |                 |
+-----------------+--------------------------+-------------------+-----------------+
| TOTAL PROTEIN   | SPECIMEN HEMOLYZED, WILL | 6.3 - 8.2 g/dL    | [facility] |
|                 |  BE REDRAWN RN TO SEND   |                   |                 |
+-----------------+--------------------------+-------------------+-----------------+
| Albumin         | SPECIMEN HEMOLYZED, WILL | 3.3 - 4.8 g/dL    | [facility] |
|                 |  BE REDRAWN RN TO SEND   |                   |                 |
+-----------------+--------------------------+-------------------+-----------------+
| GLOBULIN        | SPECIMEN HEMOLYZED, WILL | 1.3 - 4.9 g/dL    | [facility] |
|                 |  BE REDRAWN RN TO SEND   |                   |                 |
+-----------------+--------------------------+-------------------+-----------------+
| A/G             | SPECIMEN HEMOLYZED, WILL | 1.0 - 2.4         | [facility] |
|                 |  BE REDRAWN RN TO SEND   |                   |                 |
+-----------------+--------------------------+-------------------+-----------------+
| TBIL            | SPECIMEN HEMOLYZED, WILL | 0.1 - 1.5 mg/dL   | [facility] |
|                 |  BE REDRAWN RN TO SEND   |                   |                 |
+-----------------+--------------------------+-------------------+-----------------+
| ALK PHOS        | SPECIMEN HEMOLYZED, WILL | 35 - 115 U/L      | [facility] |
|                 |  BE REDRAWN RN TO SEND   |                   |                 |
 
+-----------------+--------------------------+-------------------+-----------------+
| AST             | SPECIMEN HEMOLYZED, WILL | 10 - 45 U/L       | [facility] |
|                 |  BE REDRAWN RN TO SEND   |                   |                 |
+-----------------+--------------------------+-------------------+-----------------+
| ALT             | SPECIMEN HEMOLYZED, WILL | 10 - 65 U/L       | [facility] |
|                 |  BE REDRAWN RN TO SEND   |                   |                 |
+-----------------+--------------------------+-------------------+-----------------+
| EGFR            | SPECIMEN HEMOLYZED, WILL | >60 mL/min/1.73m2 | [facility] |
|                 |  BE REDRAWN RN TO SEND   |                   |                 |
+-----------------+--------------------------+-------------------+-----------------+
| CPK             | SPECIMEN HEMOLYZED, WILL | 55 - 400 U/L      | [facility] |
|                 |  BE REDRAWN RN TO SEND   |                   |                 |
+-----------------+--------------------------+-------------------+-----------------+
| PROTIME         | SPECIMEN HEMOLYZED, WILL | seconds           | [facility] |
|                 |  BE REDRAWN RN TO SEND   |                   |                 |
+-----------------+--------------------------+-------------------+-----------------+
| INR             | SPECIMEN HEMOLYZED, WILL |                   | [facility] |
|                 |  BE REDRAWN RN TO SEND   |                   |                 |
+-----------------+--------------------------+-------------------+-----------------+
| APTT            | SPECIMEN HEMOLYZED, WILL | 23 - 32 seconds   | [facility] |
|                 |  BE REDRAWN RN TO SEND   |                   |                 |
+-----------------+--------------------------+-------------------+-----------------+
| MMB             | SPECIMEN HEMOLYZED, WILL | 0.5 - 3.6 ng/mL   | [facility] |
|                 |  BE REDRAWN RN TO SEND   |                   |                 |
+-----------------+--------------------------+-------------------+-----------------+
| CK-MB Index     | SPECIMEN HEMOLYZED, WILL |                   | [facility] |
|                 |  BE REDRAWN [place] TO SEND   |                   |                 |
+-----------------+--------------------------+-------------------+-----------------+
 
 
 
+-------------------+------------------+--------------------+--------------+
| Performing        | Address          | City/State/Zipcode | Phone Number |
| Organization      |                  |                    |              |
+-------------------+------------------+--------------------+--------------+
|   [facility] |   [address] | [place] [address] |              |
+-------------------+------------------+--------------------+--------------+
 EKG 12 LEAD UNIT PERFORMED (2018  5:31 PM)
 
+-------------------+-------------------------+-----------+--------------+
| Component         | Value                   | Ref Range | Performed At |
+-------------------+-------------------------+-----------+--------------+
| Ventricular Rate  | 74                      | BPM       | [facility]     |
+-------------------+-------------------------+-----------+--------------+
| Atrial Rate       | 74                      | BPM       | [facility]     |
+-------------------+-------------------------+-----------+--------------+
| P-R Interval      | 158                     | ms        | [facility]     |
+-------------------+-------------------------+-----------+--------------+
| QRS Duration      | 88                      | ms        | [facility]     |
+-------------------+-------------------------+-----------+--------------+
| Q-T Interval      | 378                     | ms        | [facility]     |
+-------------------+-------------------------+-----------+--------------+
| QTC Calculation   | 419                     | ms        | [facility]     |
| (Bezet)           |                         |           |              |
+-------------------+-------------------------+-----------+--------------+
| Calculated P Axis | 16                      | degrees   | [facility]     |
+-------------------+-------------------------+-----------+--------------+
| Calculated R Axis | 17                      | degrees   | [facility]     |
+-------------------+-------------------------+-----------+--------------+
| Calculated T Axis | 18                      | degrees   | [facility]     |
 
+-------------------+-------------------------+-----------+--------------+
| Diagnosis         | Normal sinus            |           | [facility]     |
|                   | rhythmNormal ECGWhen    |           |              |
|                   | compared with ECG of    |           |              |
|                   | 2018 16:47,No    |           |              |
|                   | significant change was  |           |              |
|                   | foundThis ECG contains  |           |              |
|                   | Unconfirmed             |           |              |
|                   | Interpretation          |           |              |
|                   | Statements.    See ED   |           |              |
|                   | Record for Physician    |           |              |
|                   | Interpretation.         |           |              |
|                   | Confirmed by MUSE READ  |           |              |
|                   | ONLY, -COMPUTER ([id number]),  |           |              |
|                   |  Meryl Kern  |           |              |
|                   | (79) on 2018       |           |              |
|                   | 3:54:34 AM              |           |              |
+-------------------+-------------------------+-----------+--------------+
 
 
 
+--------------+-------------------+--------------------+--------------+
| Performing   | Address           | City/State/Zipcode | Phone Number |
| Organization |                   |                    |              |
+--------------+-------------------+--------------------+--------------+
|   [facility]   |   [address] | [place] [address] |              |
+--------------+-------------------+--------------------+--------------+
 ED INFORMATION EXCHANGE (2018  3:51 PM)
 
+------------------------------------------------------------------------+--------------+
| Narrative                                                              | Performed At |
+------------------------------------------------------------------------+--------------+
|   [id number] [id number]     This patient has registered at the   |   ED         |
| [facility] Emergency Department   For more         | INFORMATION  |
| information visit:                                                     | EXCHANGE     |
| [email or web address] |              |
| [id number]   Security Events  No recent Security Events currently on file  |              |
|     ED Care Guidelines from [facility]  Last Updated: 18 |              |
|  9:04 AM         Additional Information:  Currently placed at [place] |              |
|  [facility], please contact Gaby  |              |
| Osgood [phone].Prescription medication goes through [facility], |              |
|  [phone].  These are guidelines and the provider should exercise  |              |
| clinical judgment when providing care.     Recent Emergency Department |              |
|  Visit Summary  Admit Date Facility City State Type Major Type         |              |
| Diagnoses or Chief Complaint   2018 [facility]       |              |
| [facility] Emergency    Emergency          Weakness        Shortness of |              |
|  Breath      2018 [facility]               |              |
| Emergency    Emergency          Shortness of Breath        Weakness    |              |
|      Lymphedema, not elsewhere classified            E.D. Visit Count  |              |
| (12 mo.)  Facility Visits Low Acuity   [facility]  |              |
| 3 0   Total 3 0   Note: Visits indicate total known visits. Medicaid   |              |
| Low Acuity Dx are the number of primary diagnoses on the Medicaid's    |              |
| Low Acuity dx list.         Recent Inpatient Visit Summary  No         |              |
| recorded inpatient visits.      PDMP Report  PDMP query found no       |              |
| report.     Care Providers  Provider PRC Type Phone Fax Service Dates  |              |
|   [facility] Provider ([phone]                   |              |
| [phone]    Current         Known Aliases  No known aliases.           |              |
| Criteria met         Care Guidelines     The above information is      |              |
| provided for the sole purpose of patient treatment. Use of this        |              |
| information beyond the terms of Data Sharing Memorandum of             |              |
 
| Understanding and License Agreement is prohibited. In certain cases    |              |
| not all visits may be represented.   Consult the aforementioned        |              |
| facilities for additional information.   2018 [email or web address]       |              |
| [employer] - [place], [place] -                              |              |
| [email or web address]                                         |              |
+------------------------------------------------------------------------+--------------+
 
 
 
+-------------------------------------------------------------------------------------------
--------------------------------------------------------------------------------------------
--------------------+
| Procedure Note                                                                            
                                                                                            
                    |
+-------------------------------------------------------------------------------------------
--------------------------------------------------------------------------------------------
--------------------+
|   Carlos, Lab - 2018  3:52 PM PDT  Formatting of this note may be different      
                                                                                            
                    |
| from the original.EDIE15:[id number] [id number] patient has registered at the [facility]    
                                                                                            
                    |
| [facility] Emergency Department For more information visit:                  
                                                                                            
                    |
| [email or web address] Security     
                                                                                            
                    |
| EventsNo recent Security Events currently on fileED Care Guidelines from [employer] -       
                                                                                            
                    |
| Jeovany Updated: 18 9:04 AM  Additional Information:Currently placed at         
                                                                                            
                    |
| [facility], please contact Candace Osgood      
                                                                                            
                    |
| [phone].Prescription medication goes through [facility], [phone].These are    
                                                                                            
                    |
| guidelines and the provider should exercise clinical judgment when providing care.Recent  
                                                                                            
                    |
|  Emergency Department Visit SummaryAdmit Date Facility City State Type Major Type         
                                                                                            
                    |
| Diagnoses or Chief Complaint 2018 [facility] [place] Emergency        
                                                                                            
                    |
| Emergency    Weakness    Shortness of Breath  2018 [facility] [place]    
                                                                                            
                    |
| [place] Emergency  Emergency    Shortness of Breath    Weakness    Lymphedema, not elsewhere   
                                                                                            
                    |
| classified  E.D. Visit Count (12 mo.)Facility Visits Low Acuity [facility]   
                                                                                            
                    |
 
| Center 3 0 Total 3 0 Note: Visits indicate total known visits. Medicaid Low Acuity Dx     
                                                                                            
                    |
| are the number of primary diagnoses on the Medicaid's Low Acuity dx list.  Recent         
                                                                                            
                    |
| Inpatient Visit SummaryNo recorded inpatient visits. PDMP ReportPDMP query found no       
                                                                                            
                    |
| report.Care ProvidersProvider PRC Type Phone Fax Service Dates [facility]      
                                                                                            
                    |
| Mental Health Provider [phone]  Current  Known AliasesNo known aliases. Criteria   
                                                                                            
                    |
| met  Care GuidelinesThe above information is provided for the sole purpose of patient     
                                                                                            
                    |
| treatment. Use of this information beyond the terms of Data Sharing Memorandum of         
                                                                                            
                    |
| Understanding and License Agreement is prohibited. In certain cases not all visits may    
                                                                                            
                    |
| be represented. Consult the aforementioned facilities for additional information. 2018    
                                                                                            
                    |
| [employer] - [place], [place] -                              
                                                                                            
                    |
| [email or web address]                                                            
                                                                                            
                    |
|                                                                                           
                                                                                            
                    |
|                                                                                           
                                                                                            
                    |
|                                                                                           
                                                                                            
                    |
|E.D. Visit Count (12 mo.)                                                                  
                                                                                            
                   |
|Facility Visits Low Acuity                                                                 
                                                                                            
                    |
|[facility] 3 0                                                         
                                                                                            
                    |
|Total 3 0                                                                                  
                                                                                            
                    |
|Note: Visits indicate total known visits. Medicaid Low Acuity Dx are the number of primary 
diagnoses on the Medicaid's Low Acuity dx list.                                             
                    |
|                                                                                           
                                                                                            
                    |
 
|Recent Inpatient Visit Summary                                                             
                                                                                            
                    |
|No recorded inpatient visits.                                                              
                                                                                            
                    |
|                                                                                           
                                                                                            
                    |
|PDMP Report                                                                                
                                                                                            
                    |
|PDMP query found no report.                                                                
                                                                                            
                    |
|                                                                                           
                                                                                            
                    |
|Care Providers                                                                             
                                                                                            
                    |
|Provider PRC Type Phone Fax Service Dates                                                  
                                                                                            
                    |
|[facility] Provider [phone]  Current                       
                                                                                            
                    |
|                                                                                           
                                                                                            
                    |
|Known Aliases                                                                              
                                                                                            
                    |
|No known aliases.                                                                          
                                                                                            
                    |
|Criteria met                                                                               
                                                                                            
                    |
|                                                                                           
                                                                                            
                    |
|  Care Guidelines                                                                          
                                                                                            
                    |
|                                                                                           
                                                                                            
                    |
|The above information is provided for the sole purpose of patient treatment. Use of this in
formation beyond the terms of Data Sharing Memorandum of Understanding and License Agreement
 is prohibited. In  |
|certain cases not all visits may be represented. Consult the aforementioned facilities for 
additional information.                                                                     
                    |
|2018 [employer] - [place], [place] - [email or web address]                                                                                       
                    |
+-------------------------------------------------------------------------------------------
--------------------------------------------------------------------------------------------
--------------------+
 
 
 
 
+-------------------+---------+--------------------+--------------+
| Performing        | Address | City/State/Zipcode | Phone Number |
| Organization      |         |                    |              |
+-------------------+---------+--------------------+--------------+
|   ED INFORMATION  |         |                    |              |
| EXCHANGE          |         |                    |              |
+-------------------+---------+--------------------+--------------+
 in this encounter
 
 Visit Diagnoses
 
 
+--------------------------------------------------------+
| Diagnosis                                              |
+--------------------------------------------------------+
| Hypoxemia - Primary                                    |
+--------------------------------------------------------+
| COPD with acute exacerbation (HCC)                     |
+--------------------------------------------------------+
|   Obstructive chronic bronchitis with exacerbation     |
+--------------------------------------------------------+
| Dyspnea on exertion                                    |
+--------------------------------------------------------+
|   Other dyspnea and respiratory abnormality            |
+--------------------------------------------------------+
| Leg swelling                                           |
+--------------------------------------------------------+
|   Swelling of limb                                     |
+--------------------------------------------------------+
| BMI 31.0-31.9,adult                                    |
+--------------------------------------------------------+
|   Body Mass Index 31.0-31.9, adult                     |
+--------------------------------------------------------+
| Schizoaffective disorder (HCC)                         |
+--------------------------------------------------------+
|   Schizoaffective disorder, unspecified condition      |
+--------------------------------------------------------+
| COPD (chronic obstructive pulmonary disease) ([facility])     |
+--------------------------------------------------------+
|   Chronic airway obstruction, not elsewhere classified |
+--------------------------------------------------------+
 
 
 
 Admitting Diagnoses
 
 
+----------------------------------------------------+
| Diagnosis                                          |
+----------------------------------------------------+
| Hypoxemia                                          |
+----------------------------------------------------+
| Dyspnea on exertion                                |
+----------------------------------------------------+
|   Other dyspnea and respiratory abnormality        |
+----------------------------------------------------+
| Leg swelling                                       |
 
+----------------------------------------------------+
|   Swelling of limb                                 |
+----------------------------------------------------+
| COPD with acute exacerbation (HCC)                 |
+----------------------------------------------------+
|   Obstructive chronic bronchitis with exacerbation |
+----------------------------------------------------+
 
 
 
 Administered Medications
 
 
+------------------+--------+---------+------+------+------+
| Medication Order | MAR    | Action  | Dose | Rate | Site |
|                  | Action | Date    |      |      |      |
+------------------+--------+---------+------+------+------+
 
 
 
+-----------------------------------+---+
|   acetaminophen (TYLENOL)         |   |
| suppository 650 mg  650 mg,       |   |
| Rectal, Every 6 Hours PRN, Mild   |   |
| Pain (1-3), Fever, Starting Fri   |   |
| 18 at 1959                   |   |
+-----------------------------------+---+
|                                   |   |
+-----------------------------------+---+
|   acetaminophen (TYLENOL) tablet  |   |
| 650 mg  650 mg, Oral, Every 6     |   |
| Hours PRN, Mild Pain (1-3),       |   |
| Fever, Starting 18 at    |   |
| 1959                              |   |
+-----------------------------------+---+
|                                   |   |
+-----------------------------------+---+
 
 
 
+-----------------------------------+-------+----------+-------+---+---+
|   atorvastatin (LIPITOR) tablet   | Given |  | 20 mg |   |   |
| 20 mg  20 mg, Oral, Nightly,      |       | 8 22:27  |       |   |   |
| First dose on 18 at 2200 |       | PDT      |       |   |   |
+-----------------------------------+-------+----------+-------+---+---+
 
 
 
+-------+----------+-------+---+---+
| Given |  | 20 mg |   |   |
|       | 8 22:03  |       |   |   |
|       | PDT      |       |   |   |
+-------+----------+-------+---+---+
| Given |  | 20 mg |   |   |
|       | 8 20:42  |       |   |   |
|       | PDT      |       |   |   |
+-------+----------+-------+---+---+
 
 
 
 
+---+---+
|   |   |
+---+---+
 
 
 
+-----------------------------------+---------+----------+--------+-------+---+
|   azithromycin (ZITHROMAX) IVPB   | New Bag |  | 500 mg | 250   |   |
| 500 mg  500 mg, Intravenous,      |         | 8 19:39  |        | mL/hr |   |
| Administer over 60 Minutes, Once, |         | PDT      |        |       |   |
|  Fri 18 at 1911, For 1 dose  |         |          |        |       |   |
+-----------------------------------+---------+----------+--------+-------+---+
 
 
 
+---+---+
|   |   |
+---+---+
 
 
 
+-----------------------------------+-------+----------+--------+---+---+
|   budesonide (PULMICORT)          | Given |  | 0.5 mg |   |   |
| nebulizer suspension 0.5 mg  0.5  |       | 8 06:55  |        |   |   |
| mg, Nebulization, 2 Times Daily,  |       | PDT      |        |   |   |
| First dose on 18 at 2100 |       |          |        |   |   |
+-----------------------------------+-------+----------+--------+---+---+
 
 
 
+-------+----------+--------+---+---+
| Given |  | 0.5 mg |   |   |
|       | 8 20:21  |        |   |   |
|       | PDT      |        |   |   |
+-------+----------+--------+---+---+
| Given |  | 0.5 mg |   |   |
|       | 8 07:00  |        |   |   |
|       | PDT      |        |   |   |
+-------+----------+--------+---+---+
 
 
 
+---+---+
|   |   |
+---+---+
 
 
 
+-----------------------------------+-------+----------+--------+---+---+
|   buPROPion (WELLBUTRIN XL) 24 hr | Given |  | 300 mg |   |   |
|  tablet 300 mg  300 mg, Oral,     |       | 8 10:35  |        |   |   |
| Every Morning, First dose on Sat  |       | PDT      |        |   |   |
| 18 at 0900                   |       |          |        |   |   |
+-----------------------------------+-------+----------+--------+---+---+
 
 
 
+-------+----------+--------+---+---+
| Given |  | 300 mg |   |   |
|       | 8 09:48  |        |   |   |
 
|       | PDT      |        |   |   |
+-------+----------+--------+---+---+
| Given |  | 300 mg |   |   |
|       | 8 09:37  |        |   |   |
|       | PDT      |        |   |   |
+-------+----------+--------+---+---+
 
 
 
+---+---+
|   |   |
+---+---+
 
 
 
+-----------------------------------+-------+----------+-------+---+---+
|   enoxaparin (LOVENOX) injection  | Given |  | 40 mg |   |   |
| 40 mg  40 mg, Subcutaneous, Every |       | 8 22:26  |       |   |   |
|  24 Hours, First dose on Fri      |       | PDT      |       |   |   |
| 18 at 2030                   |       |          |       |   |   |
+-----------------------------------+-------+----------+-------+---+---+
 
 
 
+-------+----------+-------+---+---+
| Given |  | 40 mg |   |   |
|       | 8 22:03  |       |   |   |
|       | PDT      |       |   |   |
+-------+----------+-------+---+---+
| Given |  | 40 mg |   |   |
|       | 8 20:42  |       |   |   |
|       | PDT      |       |   |   |
+-------+----------+-------+---+---+
 
 
 
+-----------------------------------+---+
|                                   |   |
+-----------------------------------+---+
|   famotidine (PEPCID) IVPB 20 mg  |   |
|  20 mg, Intravenous, Administer   |   |
| over 30 Minutes, 2 Times Daily,   |   |
| First dose on 18 at 2100 |   |
+-----------------------------------+---+
|                                   |   |
+-----------------------------------+---+
 
 
 
+-----------------------------------+-------+----------+-------+---+---+
|   famotidine (PEPCID) tablet 20   | Given |  | 20 mg |   |   |
| mg  20 mg, Oral, 2 Times Daily,   |       | 8 09:49  |       |   |   |
| First dose on 18 at 2100 |       | PDT      |       |   |   |
+-----------------------------------+-------+----------+-------+---+---+
 
 
 
+-------+----------+-------+---+---+
| Given |  | 20 mg |   |   |
|       | 8 20:42  |       |   |   |
 
|       | PDT      |       |   |   |
+-------+----------+-------+---+---+
| Given |  | 20 mg |   |   |
|       | 8 09:36  |       |   |   |
|       | PDT      |       |   |   |
+-------+----------+-------+---+---+
 
 
 
+---+---+
|   |   |
+---+---+
 
 
 
+-----------------------------------+-------+----------+-------+---+---+
|   furosemide (LASIX) tablet 20 mg | Given |  | 20 mg |   |   |
|   20 mg, Oral, Daily, First dose  |       | 8 10:30  |       |   |   |
| on Sat 7/28/18 at 0900            |       | PDT      |       |   |   |
+-----------------------------------+-------+----------+-------+---+---+
 
 
 
+-------+----------+-------+---+---+
| Given |  | 20 mg |   |   |
|       | 8 09:49  |       |   |   |
|       | PDT      |       |   |   |
+-------+----------+-------+---+---+
| Given |  | 20 mg |   |   |
|       | 8 09:36  |       |   |   |
|       | PDT      |       |   |   |
+-------+----------+-------+---+---+
 
 
 
+---+---+
|   |   |
+---+---+
 
 
 
+-----------------------------------+-------+----------+---------+---+---+
|   iopamidol (ISOVUE-370) 76 %     | Given |  | 100 mLs |   |   |
| injection 100 mL  100 mL,         |       | 8 23:23  |         |   |   |
| Intravenous, Img Once PRN, Other, |       | PDT      |         |   |   |
|  Starting Fri 18 at 2313,    |       |          |         |   |   |
| For 1 dose                        |       |          |         |   |   |
+-----------------------------------+-------+----------+---------+---+---+
 
 
 
+---+---+
|   |   |
+---+---+
 
 
 
+-----------------------------------+-------+----------+-------+---+---+
|   ipratropium-albuterol (DUO-NEB) | Given |  | 3 mLs |   |   |
|  0.5-2.5 mg/3mL nebulizer         |       | 8 17:48  |       |   |   |
 
| solution 3 mL  3 mL,              |       | PDT      |       |   |   |
| Nebulization, Once RT, Fri        |       |          |       |   |   |
| 18 at 1724, For 1 dose       |       |          |       |   |   |
+-----------------------------------+-------+----------+-------+---+---+
 
 
 
+---+---+
|   |   |
+---+---+
 
 
 
+-----------------------------------+-------+----------+-------+---+---+
|   ipratropium-albuterol (DUO-NEB) | Given |  | 3 mLs |   |   |
|  0.5-2.5 mg/3mL nebulizer         |       | 8 23:20  |       |   |   |
| solution 3 mL  3 mL,              |       | PDT      |       |   |   |
| Nebulization, Every 4 Hours While |       |          |       |   |   |
|  Awake, First dose on 18 |       |          |       |   |   |
|  at 2200                          |       |          |       |   |   |
+-----------------------------------+-------+----------+-------+---+---+
 
 
 
+-------+----------+-------+---+---+
| Given |  | 3 mLs |   |   |
|       | 8 07:00  |       |   |   |
|       | PDT      |       |   |   |
+-------+----------+-------+---+---+
| Given |  | 3 mLs |   |   |
|       | 8 11:00  |       |   |   |
|       | PDT      |       |   |   |
+-------+----------+-------+---+---+
 
 
 
+---+---+
|   |   |
+---+---+
 
 
 
+-----------------------------------+-------+----------+--------+-------+---+
|   levofloxacin (LEVAQUIN) IVPB    | Given |  | 500 mg | 100   |   |
| 500 mg  500 mg, Intravenous,      |       | 8 00:30  |        | mL/hr |   |
| Administer over 60 Minutes, Every |       | PDT      |        |       |   |
|  24 Hours, First dose on Sat      |       |          |        |       |   |
| 18 at 0000                   |       |          |        |       |   |
+-----------------------------------+-------+----------+--------+-------+---+
 
 
 
+-------+----------+--------+-------+---+
| Given |  | 500 mg | 100   |   |
|       | 8 01:30  |        | mL/hr |   |
|       | PDT      |        |       |   |
+-------+----------+--------+-------+---+
| Given |  | 500 mg | 100   |   |
|       | 8 23:52  |        | mL/hr |   |
|       | PDT      |        |       |   |
 
+-------+----------+--------+-------+---+
 
 
 
+---+---+
|   |   |
+---+---+
 
 
 
+----------------------------------+-------+----------+-------+---+---+
|   lurasidone (LATUDA) tablet 40  | Given |  | 40 mg |   |   |
| mg  40 mg, Oral, Daily, First    |       | 8 22:26  |       |   |   |
| dose on 18 at 2100      |       | PDT      |       |   |   |
+----------------------------------+-------+----------+-------+---+---+
 
 
 
+-------+----------+-------+---+---+
| Given |  | 40 mg |   |   |
|       | 8 22:03  |       |   |   |
|       | PDT      |       |   |   |
+-------+----------+-------+---+---+
| Given |  | 40 mg |   |   |
|       | 8 20:42  |       |   |   |
|       | PDT      |       |   |   |
+-------+----------+-------+---+---+
 
 
 
+---+---+
|   |   |
+---+---+
 
 
 
+----------------------------------+-------+----------+--------+---+---+
|   methylPREDNISolone             | Given |  | 125 mg |   |   |
| (Solu-MEDROL) injection 125 mg   |       | 8 18:02  |        |   |   |
| 125 mg, Intravenous, Once, Fri   |       | PDT      |        |   |   |
| 18 at 1724, For 1 dose      |       |          |        |   |   |
+----------------------------------+-------+----------+--------+---+---+
 
 
 
+---+---+
|   |   |
+---+---+
 
 
 
+-----------------------------------+-------+----------+-------+---+---+
|   OLANZapine (ZyPREXA) tablet 10  | Given |  | 10 mg |   |   |
| mg  10 mg, Oral, 2 Times Daily,   |       | 8 09:49  |       |   |   |
| First dose on 18 at 2100 |       | PDT      |       |   |   |
+-----------------------------------+-------+----------+-------+---+---+
 
 
 
+-------+----------+-------+---+---+
 
| Given |  | 10 mg |   |   |
|       | 8 20:43  |       |   |   |
|       | PDT      |       |   |   |
+-------+----------+-------+---+---+
| Given |  | 10 mg |   |   |
|       | 8 09:37  |       |   |   |
|       | PDT      |       |   |   |
+-------+----------+-------+---+---+
 
 
 
+---+---+
|   |   |
+---+---+
 
 
 
+-----------------------------------+-------+----------+-------+---+---+
|   pantoprazole (PROTONIX) EC      | Given |  | 40 mg |   |   |
| tablet 40 mg  40 mg, Oral, Every  |       | 8 05:33  |       |   |   |
| Morning Before Breakfast, First   |       | PDT      |       |   |   |
| dose on Sat 18 at 0630       |       |          |       |   |   |
+-----------------------------------+-------+----------+-------+---+---+
 
 
 
+-------+----------+-------+---+---+
| Given |  | 40 mg |   |   |
|       | 8 05:45  |       |   |   |
|       | PDT      |       |   |   |
+-------+----------+-------+---+---+
| Given |  | 40 mg |   |   |
|       | 8 08:00  |       |   |   |
|       | PDT      |       |   |   |
+-------+----------+-------+---+---+
 
 
 
+---+---+
|   |   |
+---+---+
 
 
 
+-----------------------------------+-------+----------+--------+---+---+
|   potassium chloride (K-DUR) CR   | Given |  | 40 mEq |   |   |
| tablet 40 mEq  40 mEq, Oral,      |       | 8 09:36  |        |   |   |
| Once, Mon 18 at 0800, For 1  |       | PDT      |        |   |   |
| dose                              |       |          |        |   |   |
+-----------------------------------+-------+----------+--------+---+---+
 
 
 
+---+---+
|   |   |
+---+---+
 
 
 
+----------------------------------+-------+----------+-------+---+---+
 
|   predniSONE (DELTASONE) tablet  | Given |  | 50 mg |   |   |
| 50 mg  50 mg, Oral, Daily With   |       | 8 10:29  |       |   |   |
| Breakfast, First dose on Sat     |       | PDT      |       |   |   |
| 18 at 0800                  |       |          |       |   |   |
+----------------------------------+-------+----------+-------+---+---+
 
 
 
+-------+----------+-------+---+---+
| Given |  | 50 mg |   |   |
|       | 8 09:49  |       |   |   |
|       | PDT      |       |   |   |
+-------+----------+-------+---+---+
| Given |  | 50 mg |   |   |
|       | 8 09:36  |       |   |   |
|       | PDT      |       |   |   |
+-------+----------+-------+---+---+
 
 
 
+---+---+
|   |   |
+---+---+
 
 
 
+-----------------------------------+-------+----------+-------+---+---+
|   propranolol (INDERAL) tablet 20 | Given |  | 20 mg |   |   |
|  mg  20 mg, Oral, 2 Times Daily,  |       | 8 09:48  |       |   |   |
| First dose on 18 at 2100 |       | PDT      |       |   |   |
+-----------------------------------+-------+----------+-------+---+---+
 
 
 
+-------+----------+-------+---+---+
| Given | 7/29/201 | 20 mg |   |   |
|       | 8 20:43  |       |   |   |
|       | PDT      |       |   |   |
+-------+----------+-------+---+---+
| Given |  | 20 mg |   |   |
|       | 8 09:36  |       |   |   |
|       | PDT      |       |   |   |
+-------+----------+-------+---+---+
 
 
 
+---+---+
|   |   |
+---+---+
 
 
 
+-----------------------------------+-------+----------+--------+---+---+
|   sertraline (ZOLOFT) tablet 100  | Given |  | 100 mg |   |   |
| mg  100 mg, Oral, Daily, First    |       | 8 10:30  |        |   |   |
| dose on Sat 18 at 0800       |       | PDT      |        |   |   |
+-----------------------------------+-------+----------+--------+---+---+
 
 
 
 
+-------+----------+--------+---+---+
| Given |  | 100 mg |   |   |
|       | 8 09:49  |        |   |   |
|       | PDT      |        |   |   |
+-------+----------+--------+---+---+
| Given |  | 100 mg |   |   |
|       | 8 09:43  |        |   |   |
|       | PDT      |        |   |   |
+-------+----------+--------+---+---+
 
 
 
+---+---+
|   |   |
+---+---+
 
 
 
+----------------------------------+-------+----------+-------+---+---+
|   sertraline (ZOLOFT) tablet 50  | Given |  | 50 mg |   |   |
| mg  50 mg, Oral, See Admin       |       | 8 15:55  |       |   |   |
| Instructions, Starting Sat       |       | PDT      |       |   |   |
| 18 at 1500                  |       |          |       |   |   |
+----------------------------------+-------+----------+-------+---+---+
 
 
 
+-------+----------+-------+---+---+
| Given |  | 50 mg |   |   |
|       | 8 15:09  |       |   |   |
|       | PDT      |       |   |   |
+-------+----------+-------+---+---+
 
 
 
+-----------------------------------+---+
|                                   |   |
+-----------------------------------+---+
|   sertraline (ZOLOFT) tablet 50   |   |
| mg  50 mg, Oral, Every Evening,   |   |
| First dose on Mon 18 at 1600 |   |
+-----------------------------------+---+
|                                   |   |
+-----------------------------------+---+
 
 
 
+-----------------------------------+-------+----------+--------+---+---+
|   sodium chloride (PF) 0.9 %      | Given |  | 10 mLs |   |   |
| flush 10 mL  10 mL, Intravenous,  |       | 8 12:09  |        |   |   |
| Every 8 Hours, First dose on Fri  |       | PDT      |        |   |   |
| 18 at 2030                   |       |          |        |   |   |
+-----------------------------------+-------+----------+--------+---+---+
 
 
 
+-------+----------+--------+---+---+
| Given |  | 10 mLs |   |   |
|       | 8 20:44  |        |   |   |
|       | PDT      |        |   |   |
 
+-------+----------+--------+---+---+
| Given |  | 10 mLs |   |   |
|       | 8 09:36  |        |   |   |
|       | PDT      |        |   |   |
+-------+----------+--------+---+---+
 
 
 
+---+---+
|   |   |
+---+---+
 
 
 
+-----------------------------------+-------+----------+-------+---+---+
|   topiramate (TOPAMAX) tablet 25  | Given |  | 25 mg |   |   |
| mg  25 mg, Oral, 2 Times Daily,   |       | 8 09:49  |       |   |   |
| First dose on 18 at 2100 |       | PDT      |       |   |   |
+-----------------------------------+-------+----------+-------+---+---+
 
 
 
+-------+----------+-------+---+---+
| Given |  | 25 mg |   |   |
|       | 8 20:42  |       |   |   |
|       | PDT      |       |   |   |
+-------+----------+-------+---+---+
| Given |  | 25 mg |   |   |
|       | 8 09:37  |       |   |   |
|       | PDT      |       |   |   |
+-------+----------+-------+---+---+
 
 
 
+---+---+
|   |   |
+---+---+
 
 
 
+-----------------------------------+-------+----------+--------+---+---+
|   umeclidinium-vilanterol (ANORO  | Given |  | 1 puff |   |   |
| ELLIPTA) 62.5-25 MCG/INH inhaler  |       | 8 10:28  |        |   |   |
| 1 puff  1 puff, Inhalation,       |       | PDT      |        |   |   |
| Daily, First dose on 18  |       |          |        |   |   |
| at 2030                           |       |          |        |   |   |
+-----------------------------------+-------+----------+--------+---+---+
 
 
 
+-------+----------+--------+---+---+
| Given |  | 1 puff |   |   |
|       | 8 09:47  |        |   |   |
|       | PDT      |        |   |   |
+-------+----------+--------+---+---+
| Given |  | 1 puff |   |   |
|       | 8 09:36  |        |   |   |
|       | PDT      |        |   |   |
+-------+----------+--------+---+---+
 
 
 
 
+---+---+
|   |   |
+---+---+
 in this encounter

## 2018-09-18 NOTE — XMS
Encounter Summary
  Created on: 2018
 
 Bradley Jp RIVERA
 External Reference #: [id number]
 : 48
 Sex: Male
 
 Demographics
 
 
+-----------------------+----------------------+
| Address               | [address]          |
|                       | [place]  [address] |
+-----------------------+----------------------+
| Home Phone            | [phone]      |
+-----------------------+----------------------+
| Preferred Language    | Unknown              |
+-----------------------+----------------------+
| Marital Status        |              |
+-----------------------+----------------------+
| Advent Affiliation | 1041                 |
+-----------------------+----------------------+
| Race                  | Unknown              |
+-----------------------+----------------------+
| Ethnic Group          | Unknown              |
+-----------------------+----------------------+
 
 
 Author
 
 
+--------------+-----------------------+
| Author       | [facility] |
+--------------+-----------------------+
| Organization | [facility] |
+--------------+-----------------------+
| Address      | Unknown               |
+--------------+-----------------------+
| Phone        | Unavailable           |
+--------------+-----------------------+
 
 
 
 Support
 
 
+---------------+--------------+---------+-----------------+
| Name          | Relationship | Address | Phone           |
+---------------+--------------+---------+-----------------+
| Delilah Hall | ECON         | Unknown | [phone] |
+---------------+--------------+---------+-----------------+
| Tesha Veloz   | ECON         | Unknown | [phone] |
+---------------+--------------+---------+-----------------+
 
 
 
 Care Team Providers
 
 
 
+-----------------------+------+-----------------+
| Care Team Member Name | Role | Phone           |
+-----------------------+------+-----------------+
| Kirby Guo  | PCP  | [phone] |
+-----------------------+------+-----------------+
 
 
 
 Encounter Details
 
 
+--------+-----------+---------------------+--------------------+-------------+
| Date   | Type      | Department          | Care Team          | Description |
+--------+-----------+---------------------+--------------------+-------------+
| / | Telephone |   [facility]     |   Nila Linder RN |             |
|    |           | Pulmonology  [address]   |                    |             |
|        |           | [address]   |                    |             |
|        |           | [place]        |                    |             |
|        |           | [id number]          |                    |             |
|        |           | [phone]        |                    |             |
+--------+-----------+---------------------+--------------------+-------------+
 
 
 
 Social History
 
 
+---------------+-------+-----------+--------+------------------+
| Tobacco Use   | Types | Packs/Day | Years  | Date             |
|               |       |           | Used   |                  |
+---------------+-------+-----------+--------+------------------+
| Former Smoker |       | 3         | 20     | Quit: 1998 |
+---------------+-------+-----------+--------+------------------+
 
 
 
+---------------------+---+---+---+
| Smokeless Tobacco:  |   |   |   |
| Never Used          |   |   |   |
+---------------------+---+---+---+
 
 
 
+-------------+-----------+---------+----------+
| Alcohol Use | Drinks/We | oz/Week | Comments |
|             | ek        |         |          |
+-------------+-----------+---------+----------+
| No          |           |         |          |
+-------------+-----------+---------+----------+
 
 
 
+------------------+---------------+
| Sex Assigned at  | Date Recorded |
| Birth            |               |
+------------------+---------------+
| Not on file      |               |
+------------------+---------------+
 
 as of this encounter
 
 Plan of Treatment
 
 
+--------+---------+-------------+----------------------+-------------+
| Date   | Type    | Specialty   | Care Team            | Description |
+--------+---------+-------------+----------------------+-------------+
| 10/19/ | Office  | Pulmonology |   Ben,          |             |
|    | Visit   |             | Edwina Mckeon,   |             |
|        |         |             | MD [address] |             |
|        |         |             |   [facility] [place] [place] [address] |             |
|        |         |             |   [phone]       |             |
|        |         |             | [phone] (Fax)   |             |
+--------+---------+-------------+----------------------+-------------+
 as of this encounter
 
 Visit Diagnoses
 Not on filein this encounter